# Patient Record
Sex: FEMALE | Race: OTHER | HISPANIC OR LATINO | Employment: OTHER | URBAN - METROPOLITAN AREA
[De-identification: names, ages, dates, MRNs, and addresses within clinical notes are randomized per-mention and may not be internally consistent; named-entity substitution may affect disease eponyms.]

---

## 2018-01-09 ENCOUNTER — TRANSCRIBE ORDERS (OUTPATIENT)
Dept: ADMINISTRATIVE | Facility: HOSPITAL | Age: 73
End: 2018-01-09

## 2018-01-09 ENCOUNTER — ALLSCRIPTS OFFICE VISIT (OUTPATIENT)
Dept: OTHER | Facility: OTHER | Age: 73
End: 2018-01-09

## 2018-01-09 DIAGNOSIS — R10.9 ABDOMINAL PAIN: ICD-10-CM

## 2018-01-09 DIAGNOSIS — R10.9 STOMACH ACHE: Primary | ICD-10-CM

## 2018-01-10 NOTE — CONSULTS
Assessment   1  Abdominal pain (789 00) (R10 9)   2  History of Left lower quadrant pain (789 04) (R10 32)   3  History of gastroenteritis (V12 79) (Z87 19)   4  History of colonic polyps (V12 72) (Z86 010)   5  History of Appendectomy   6  Family history of cardiac disorder (V17 49) (Z82 49) : Other   7  No alcohol use   8  Does not smoke (V49 89) (Z78 9)    Plan   Abdominal pain    · (1) HELICOBACTER PYLORI ANTIGEN, STOOL; Status:Active; Requested    HFR:[id number]; Perform:[facility]; WNC:[id number]; For:Abdominal pain; Ordered By:Reza Gibson;   · * CT ABDOMEN PELVIS W CONTRAST; Status:Need Information - Financial Authorization; Requested ZLM:[id number]; Perform:[facility]; PFQ:[id number];CYTIYIN; For:Abdominal pain; Ordered By:Reza Gibson;    Discussion/Summary   Discussion Summary:    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  Will try FODMAP diet  Discussed avoiding dairy and starting a high-fiber diet  We also discussed starting probiotics  Lastly she is worried about cancer and does not want to undergo CT scan scan with contrast or undergo colonoscopy evaluation at this time will plan for CT scan without contrast  Did discuss that reaction with CT scan with IV contrast is rare but she is very hesitant so would avoid at this time  If her symptoms does get where she will likely need a colonoscopy and a CT scan with IV contrast  I suspect due to sister being diagnosed with cancer she is worried due to this  Will continue follow  Chief Complaint   Chief Complaint Free Text Note Form: Patient is here for consult for abdominal pain  History of Present Illness   HPI: Yessi Paredes is a 28-year-old female presents here for evaluation of abdominal discomfort and dyspepsia  Abdominal discomfort is mostly located in the lower abdomen with no clear association with oral intake or bowel movements   She also recently had abdominal pain in the right side of her abdomen located in the lower aspect which resolved on its own likely due to gas pain  She is worried at this time as she recently lost her sister to unknown cancer  She is worried about having cancer and would like further evaluation  she recently had evaluation by a gynecologist of which records are not available to us due to intermittent vaginal bleeding  She is following up with OB Gyn for further evaluation of this  Current bleach she is worried about abdominal pain which is located in the periumbilical region more in the lower abdomen associated with shooting pain  She does have abdominal pain for the last 3 days but states that abdominal discomfort started prior to this  No clear triggers identified for the abdominal discomfort  She did recently have a gastroenteritis which lasted 1-2 weeks after eating at a restaurant  Multiple people were sick including her   Symptoms of abdominal discomfort has persisted since  Diarrhea has resolved now stools are becoming more well-formed  Instead of having bowel movements on a daily basis now she has multiple bowel movements feeling incomplete evacuation  She did have a colonoscopy done 5 years ago but would like to avoid 1 at this time  She has not had recent abdominal imaging or CT scans  She did have warm sensation with IV dye previously and is unsure if she would like to get a CT scan with IV contrast       Review of Systems   Complete-Female GI Adult:      Constitutional: No fever, no chills, feels well, no tiredness, no recent weight gain or weight loss  Eyes: No complaints of eye pain, no red eyes, no eyesight problems, no discharge, no dry eyes, no itching of eyes  ENT: no complaints of earache, no loss of hearing, no nose bleeds, no nasal discharge, no sore throat, no hoarseness  Cardiovascular: No complaints of slow heart rate, no fast heart rate, no chest pain, no palpitations, no leg claudication, no lower extremity edema        Respiratory: No complaints of shortness of breath, no wheezing, no cough, no SOB on exertion, no orthopnea, no PND  Gastrointestinal: abdominal pain  Genitourinary: No complaints of dysuria, no incontinence, no pelvic pain, no dysmenorrhea, no vaginal discharge or bleeding  Musculoskeletal: No complaints of arthralgias, no myalgias, no joint swelling or stiffness, no limb pain or swelling  Integumentary: No complaints of skin rash or lesions, no itching, no skin wounds, no breast pain or lump  Neurological: No complaints of headache, no confusion, no convulsions, no numbness, no dizziness or fainting, no tingling, no limb weakness, no difficulty walking  Psychiatric: Not suicidal, no sleep disturbance, no anxiety or depression, no change in personality, no emotional problems  Endocrine: No complaints of proptosis, no hot flashes, no muscle weakness, no deepening of the voice, no feelings of weakness  Hematologic/Lymphatic: No complaints of swollen glands, no swollen glands in the neck, does not bleed easily, does not bruise easily  ROS Reviewed:    ROS reviewed  Past Medical History   1  History of colonic polyps (V12 72) (Z86 010)   2  History of gastroenteritis (V12 79) (Z87 19)   3  History of Left lower quadrant pain (789 04) (R10 32)    Surgical History   1  History of Appendectomy    Social History    · Does not smoke (V49 89) (Z78 9)   · No alcohol use    Current Meds   Medication List Reviewed: The medication list was reviewed and updated today  Vitals   Vital Signs    Recorded: [id number] 10:18AM   Temperature 97 9 F, Tympanic   Heart Rate 84   Respiration 18   Systolic 514, LUE, Sitting   Diastolic 83, LUE, Sitting   Height 5 ft 3 5 in   Weight 158 lb    BMI Calculated 27 55   BSA Calculated 1 76     Physical Exam        Constitutional      General appearance: No acute distress, well appearing and well nourished         Eyes      Conjunctiva and lids: No swelling, erythema or discharge  Ears, Nose, Mouth, and Throat      External inspection of ears and nose: Normal        Oropharynx: Normal with no erythema, edema, exudate or lesions  Pulmonary      Respiratory effort: No increased work of breathing or signs of respiratory distress  Auscultation of lungs: Clear to auscultation  Cardiovascular      Palpation of heart: Normal PMI, no thrills  Auscultation of heart: Normal rate and rhythm, normal S1 and S2, without murmurs  Examination of extremities for edema and/or varicosities: Normal        Abdomen      Abdomen: Non-tender, no masses  Liver and spleen: No hepatomegaly or splenomegaly  Musculoskeletal      Gait and station: Normal        Neurologic      Cranial nerves: Cranial nerves 2-12 intact         Psychiatric      Orientation to person, place, and time: Normal           Signatures    Electronically signed by : No Nunez MD; Jan 9 2018 11:20AM EST                       (Author)

## 2018-01-15 ENCOUNTER — TRANSCRIBE ORDERS (OUTPATIENT)
Dept: LAB | Facility: HOSPITAL | Age: 73
End: 2018-01-15

## 2018-01-15 ENCOUNTER — APPOINTMENT (OUTPATIENT)
Dept: LAB | Facility: HOSPITAL | Age: 73
End: 2018-01-15
Attending: INTERNAL MEDICINE
Payer: COMMERCIAL

## 2018-01-15 DIAGNOSIS — R10.9 ABDOMINAL PAIN: ICD-10-CM

## 2018-01-15 LAB
ANION GAP SERPL CALCULATED.3IONS-SCNC: 5 MMOL/L (ref 4–13)
BUN SERPL-MCNC: 18 MG/DL (ref 5–25)
CALCIUM SERPL-MCNC: 9.3 MG/DL (ref 8.3–10.1)
CHLORIDE SERPL-SCNC: 107 MMOL/L (ref 100–108)
CO2 SERPL-SCNC: 29 MMOL/L (ref 21–32)
CREAT SERPL-MCNC: 0.78 MG/DL (ref 0.6–1.3)
GFR SERPL CREATININE-BSD FRML MDRD: 77 ML/MIN/1.73SQ M
GLUCOSE SERPL-MCNC: 88 MG/DL (ref 65–140)
POTASSIUM SERPL-SCNC: 4.1 MMOL/L (ref 3.5–5.3)
SODIUM SERPL-SCNC: 141 MMOL/L (ref 136–145)

## 2018-01-15 PROCEDURE — 80048 BASIC METABOLIC PNL TOTAL CA: CPT

## 2018-01-15 PROCEDURE — 36415 COLL VENOUS BLD VENIPUNCTURE: CPT

## 2018-01-16 ENCOUNTER — GENERIC CONVERSION - ENCOUNTER (OUTPATIENT)
Dept: OTHER | Facility: OTHER | Age: 73
End: 2018-01-16

## 2018-01-17 ENCOUNTER — GENERIC CONVERSION - ENCOUNTER (OUTPATIENT)
Dept: OTHER | Facility: OTHER | Age: 73
End: 2018-01-17

## 2018-01-17 ENCOUNTER — HOSPITAL ENCOUNTER (OUTPATIENT)
Dept: RADIOLOGY | Facility: HOSPITAL | Age: 73
Discharge: HOME | End: 2018-01-17
Attending: INTERNAL MEDICINE
Payer: COMMERCIAL

## 2018-01-17 ENCOUNTER — APPOINTMENT (OUTPATIENT)
Dept: LAB | Facility: HOSPITAL | Age: 73
End: 2018-01-17
Attending: INTERNAL MEDICINE
Payer: COMMERCIAL

## 2018-01-17 ENCOUNTER — TRANSCRIBE ORDERS (OUTPATIENT)
Dept: RADIOLOGY | Facility: HOSPITAL | Age: 73
End: 2018-01-17

## 2018-01-17 DIAGNOSIS — R10.9 ABDOMINAL PAIN: ICD-10-CM

## 2018-01-17 PROCEDURE — 87338 HPYLORI STOOL AG IA: CPT

## 2018-01-17 PROCEDURE — 74176 CT ABD & PELVIS W/O CONTRAST: CPT

## 2018-01-18 LAB — H PYLORI AG STL QL IA: POSITIVE

## 2018-01-19 ENCOUNTER — GENERIC CONVERSION - ENCOUNTER (OUTPATIENT)
Dept: OTHER | Facility: OTHER | Age: 73
End: 2018-01-19

## 2018-01-23 VITALS
BODY MASS INDEX: 26.98 KG/M2 | WEIGHT: 158 LBS | RESPIRATION RATE: 18 BRPM | HEART RATE: 84 BPM | DIASTOLIC BLOOD PRESSURE: 83 MMHG | HEIGHT: 64 IN | SYSTOLIC BLOOD PRESSURE: 120 MMHG | TEMPERATURE: 97.9 F

## 2018-01-23 NOTE — RESULT NOTES
Verified Results  (1) BASIC METABOLIC PROFILE [id number] 10:19AM Dre Carrero Order Number: [id number]     Test Name Result Flag Reference   GLUCOSE,RANDM 88 mg/dL     If the patient is fasting, the ADA then defines impaired fasting glucose as > 100 mg/dL and diabetes as > or equal to 123 mg/dL  Specimen collection should occur prior to Sulfasalazine administration due to the potential for falsely depressed results  Specimen collection should occur prior to Sulfapyridine administration due to the potential for falsely elevated results  SODIUM 141 mmol/L  136-145   POTASSIUM 4 1 mmol/L  3 5-5 3   CHLORIDE 107 mmol/L  100-108   CARBON DIOXIDE 29 mmol/L  21-32   ANION GAP (CALC) 5 mmol/L  4-13   BLOOD UREA NITROGEN 18 mg/dL  5-25   CREATININE 0 78 mg/dL  0 60-1 30   Standardized to IDMS reference method   CALCIUM 9 3 mg/dL  8 3-10 1   eGFR 77 ml/min/1 73sq [facility] Disease Education Program recommendations are as follows:  GFR calculation is accurate only with a steady state creatinine  Chronic Kidney disease less than 60 ml/min/1 73 sq  meters  Kidney failure less than 15 ml/min/1 73 sq  meters

## 2018-01-23 NOTE — RESULT NOTES
Discussion/Summary   H pylori is positive please have her office PA or nurse help with this treatment     eradicating  also should be confirmed     Verified Results  (1) [address] w [id number] 11:01AM Ryanne Trujillo Order Number: [id number]     Test Name Result Flag Reference   H PYLORI ANTIGEN Positive A Negative   Performed at:  [facility] [employer] [address]  [id number]  : Darren Liriano MD, Phone:  [phone]

## 2018-01-23 NOTE — RESULT NOTES
Discussion/Summary   CT without contrast within normal limits  can consider colonoscopy evaluation if symptoms of abdominal discomfort still persistent     Verified Results  * CT ABDOMEN PELVIS WO CONTRAST [id number] 11:22AM Cleven Party Order Number: [id number]    - Patient Instructions: To schedule this appointment, please contact Central Scheduling at [phone]  Test Name Result Flag Reference   CT ABDOMEN PELVIS WO CONTRAST (Report)     CT ABDOMEN AND PELVIS WITHOUT IV CONTRAST     INDICATION: Abdominal pain  Symptoms for a week  COMPARISON: None  TECHNIQUE: CT examination of the abdomen and pelvis was performed without intravenous contrast  The initial attempt at placement of IV catheter was unsuccessful and patient then refused further attempts  Referring doctor sent new prescription for    noncontrast exam  Reformatted images were created in axial, sagittal, and coronal planes  Radiation dose length product (DLP) for this visit: 356 69 mGy-cm   This examination, like all CT scans performed in the [facility], was performed utilizing techniques to minimize radiation dose exposure, including the use of iterative   reconstruction and automated exposure control  Enteric contrast was administered  FINDINGS:     ABDOMEN     LOWER CHEST: No significant abnormalities identified in the lower chest      LIVER/BILIARY TREE: Unremarkable  GALLBLADDER: No calcified gallstones  No pericholecystic inflammatory change  SPLEEN: Unremarkable  PANCREAS: Unremarkable  ADRENAL GLANDS: Unremarkable  KIDNEYS/URETERS: Unremarkable  No hydronephrosis  STOMACH AND BOWEL: Unremarkable  APPENDIX: No findings to suggest appendicitis  ABDOMINOPELVIC CAVITY: No ascites or free intraperitoneal air  No lymphadenopathy  VESSELS: Atherosclerotic changes are present  No evidence of aneurysm       PELVIS     REPRODUCTIVE ORGANS: Unremarkable for patient's age      URINARY BLADDER: Unremarkable  ABDOMINAL WALL/INGUINAL REGIONS: Unremarkable  OSSEOUS STRUCTURES: No acute fracture or destructive osseous lesion  There is 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

## 2018-01-23 NOTE — CONSULTS
Chief Complaint  Patient is here for consult for abdominal pain  History of Present Illness  Jim Guevara is a 79-year-old female presents here for evaluation of abdominal discomfort and dyspepsia  Abdominal discomfort is mostly located in the lower abdomen with no clear association with oral intake or bowel movements  She also recently had abdominal pain in the right side of her abdomen located in the lower aspect which resolved on its own likely due to gas pain  She is worried at this time as she recently lost her sister to unknown cancer  She is worried about having cancer and would like further evaluation  she recently had evaluation by a gynecologist of which records are not available to us due to intermittent vaginal bleeding  She is following up with OB Gyn for further evaluation of this  Current bleach she is worried about abdominal pain which is located in the periumbilical region more in the lower abdomen associated with shooting pain  She does have abdominal pain for the last 3 days but states that abdominal discomfort started prior to this  No clear triggers identified for the abdominal discomfort  She did recently have a gastroenteritis which lasted 1-2 weeks after eating at a restaurant  Multiple people were sick including her   Symptoms of abdominal discomfort has persisted since  Diarrhea has resolved now stools are becoming more well-formed  Instead of having bowel movements on a daily basis now she has multiple bowel movements feeling incomplete evacuation  She did have a colonoscopy done 5 years ago but would like to avoid 1 at this time  She has not had recent abdominal imaging or CT scans  She did have warm sensation with IV dye previously and is unsure if she would like to get a CT scan with IV contrast       Review of Systems    Constitutional: No fever, no chills, feels well, no tiredness, no recent weight gain or weight loss     Eyes: No complaints of eye pain, no red eyes, no eyesight problems, no discharge, no dry eyes, no itching of eyes  ENT: no complaints of earache, no loss of hearing, no nose bleeds, no nasal discharge, no sore throat, no hoarseness  Cardiovascular: No complaints of slow heart rate, no fast heart rate, no chest pain, no palpitations, no leg claudication, no lower extremity edema  Respiratory: No complaints of shortness of breath, no wheezing, no cough, no SOB on exertion, no orthopnea, no PND  Gastrointestinal: abdominal pain  Genitourinary: No complaints of dysuria, no incontinence, no pelvic pain, no dysmenorrhea, no vaginal discharge or bleeding  Musculoskeletal: No complaints of arthralgias, no myalgias, no joint swelling or stiffness, no limb pain or swelling  Integumentary: No complaints of skin rash or lesions, no itching, no skin wounds, no breast pain or lump  Neurological: No complaints of headache, no confusion, no convulsions, no numbness, no dizziness or fainting, no tingling, no limb weakness, no difficulty walking  Psychiatric: Not suicidal, no sleep disturbance, no anxiety or depression, no change in personality, no emotional problems  Endocrine: No complaints of proptosis, no hot flashes, no muscle weakness, no deepening of the voice, no feelings of weakness  Hematologic/Lymphatic: No complaints of swollen glands, no swollen glands in the neck, does not bleed easily, does not bruise easily  ROS reviewed  Past Medical History    · History of colonic polyps (V12 72) (Z86 010)   · History of gastroenteritis (V12 79) (Z87 19)   · History of Left lower quadrant pain (789 04) (R10 32)    Surgical History    · History of Appendectomy    Social History    · Does not smoke (V49 89) (Z78 9)   · No alcohol use    Current Meds    The medication list was reviewed and updated today        Vitals   Recorded: [id number] 10:18AM   Temperature 97 9 F, Tympanic   Heart Rate 84   Respiration 18   Systolic 682, LUE, Sitting   Diastolic 83, LUE, Sitting   Height 5 ft 3 5 in   Weight 158 lb    BMI Calculated 27 55   BSA Calculated 1 76     Physical Exam    Constitutional   General appearance: No acute distress, well appearing and well nourished  Eyes   Conjunctiva and lids: No swelling, erythema or discharge  Ears, Nose, Mouth, and Throat   External inspection of ears and nose: Normal     Oropharynx: Normal with no erythema, edema, exudate or lesions  Pulmonary   Respiratory effort: No increased work of breathing or signs of respiratory distress  Auscultation of lungs: Clear to auscultation  Cardiovascular   Palpation of heart: Normal PMI, no thrills  Auscultation of heart: Normal rate and rhythm, normal S1 and S2, without murmurs  Examination of extremities for edema and/or varicosities: Normal     Abdomen   Abdomen: Non-tender, no masses  Liver and spleen: No hepatomegaly or splenomegaly  Musculoskeletal   Gait and station: Normal     Neurologic   Cranial nerves: Cranial nerves 2-12 intact  Psychiatric   Orientation to person, place, and time: Normal          Assessment    1  Abdominal pain (789 00) (R10 9)   2  History of Left lower quadrant pain (789 04) (R10 32)   3  History of gastroenteritis (V12 79) (Z87 19)   4  History of colonic polyps (V12 72) (Z86 010)   5  History of Appendectomy   6  Family history of cardiac disorder (V17 49) (Z82 49) : Other   7  No alcohol use   8  Does not smoke (V49 89) (Z78 9)    Plan  Abdominal pain    · (1) HELICOBACTER PYLORI ANTIGEN, STOOL; Status:Active; Requested  RXE:[id number]; Perform:[facility]; MWF:[id number]; For:Abdominal pain; Ordered By:Reza Gibson;   · * CT ABDOMEN PELVIS W CONTRAST; Status:Need Information - Financial Authorization; Requested CTD:[id number]; Perform:[facility]; UWI:[id number]; For:Abdominal pain; Ordered By:Reza Gibson;    Discussion/Summary    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   Will try FODMAP diet  Discussed avoiding dairy and starting a high-fiber diet  We also discussed starting probiotics  Lastly she is worried about cancer and does not want to undergo CT scan scan with contrast or undergo colonoscopy evaluation at this time will plan for CT scan without contrast  Did discuss that reaction with CT scan with IV contrast is rare but she is very hesitant so would avoid at this time  If her symptoms does get where she will likely need a colonoscopy and a CT scan with IV contrast  I suspect due to sister being diagnosed with cancer she is worried due to this  Will continue follow        Signatures   Electronically signed by : Dorie Cisneros MD; Jan 9 2018 11:20AM EST                       (Author)

## 2018-01-23 NOTE — MISCELLANEOUS
Message   Recorded as Task   Date: 01/18/2018 11:43 AM, Created By: Ryanne Santillan   Task Name: Care Coordination   Assigned To: Reza Gibson   Regarding Patient: Kylie Conley, Status: Active   Comment:    Ryanne Santillan - 18 Jan 2018 11:43 AM     TASK CREATED  JUST FYI- Patient wanted you to know that she was trying to get the CT Scan done with contrast but the tech that put IV in hurt her and she was  bleeding and her arm was very sore  She told the tech her veins are very weak and she did't seem to care   This is why she did the test w/o contrast      Signatures   Electronically signed by : Derrick Sullivan MD; Jan 19 2018  8:52AM EST                       (Author)

## 2018-01-24 DIAGNOSIS — A04.8 H. PYLORI INFECTION: Primary | ICD-10-CM

## 2018-01-24 RX ORDER — OMEPRAZOLE 40 MG/1
40 CAPSULE, DELAYED RELEASE ORAL 2 TIMES DAILY
Qty: 28 CAPSULE | Refills: 0 | Status: SHIPPED | OUTPATIENT
Start: 2018-01-24 | End: 2018-02-02 | Stop reason: SDUPTHER

## 2018-01-24 RX ORDER — METRONIDAZOLE 250 MG/1
500 TABLET ORAL EVERY 12 HOURS SCHEDULED
Qty: 56 TABLET | Refills: 0 | Status: SHIPPED | OUTPATIENT
Start: 2018-01-24 | End: 2018-02-07

## 2018-01-24 RX ORDER — CLARITHROMYCIN 500 MG/1
500 TABLET, COATED ORAL EVERY 12 HOURS SCHEDULED
Qty: 28 TABLET | Refills: 0 | Status: SHIPPED | OUTPATIENT
Start: 2018-01-24 | End: 2018-02-07

## 2018-01-24 NOTE — TELEPHONE ENCOUNTER
Telephone note: Pt was informed of diagnosis of h  Pylori infection and all questions were answered  She is PCN allergic so will prescribe triple therapy with metronidazole  She is aware that she should get an h  Pylori stool test one month after completing treatment (off PPI therapy for 2 weeks) to confirm eradication

## 2018-01-24 NOTE — TELEPHONE ENCOUNTER
Please send script for h  Pylori stool test to pt - she should get it done 1 month after completing antibiotics (off PPI for at least 2 weeks)  Thank you!

## 2018-01-25 ENCOUNTER — TELEPHONE (OUTPATIENT)
Dept: GASTROENTEROLOGY | Facility: MEDICAL CENTER | Age: 73
End: 2018-01-25

## 2018-01-25 NOTE — TELEPHONE ENCOUNTER
Spoke to the patient and she is aware that this is only for the month time for her h pylori infection  If she has anyother questions she will call us back

## 2018-02-01 ENCOUNTER — TELEPHONE (OUTPATIENT)
Dept: GASTROENTEROLOGY | Facility: MEDICAL CENTER | Age: 73
End: 2018-02-01

## 2018-02-01 NOTE — TELEPHONE ENCOUNTER
Dr Deion Holbrook pt called stating she has been taking the H-Pylori medication but is still having pain in her stomach and wanted to know should she still have the pain  Pt also wants to know do she still have to take a stool sample on 02/24/18  Pt can be reached at [phone]

## 2018-02-02 ENCOUNTER — TELEPHONE (OUTPATIENT)
Dept: GASTROENTEROLOGY | Facility: CLINIC | Age: 73
End: 2018-02-02

## 2018-02-02 DIAGNOSIS — A04.8 H. PYLORI INFECTION: ICD-10-CM

## 2018-02-02 RX ORDER — OMEPRAZOLE 40 MG/1
CAPSULE, DELAYED RELEASE ORAL
Qty: 28 CAPSULE | Refills: 0 | Status: SHIPPED | OUTPATIENT
Start: 2018-02-02 | End: 2019-08-06

## 2018-02-02 RX ORDER — OMEPRAZOLE 40 MG/1
40 CAPSULE, DELAYED RELEASE ORAL 2 TIMES DAILY
Qty: 28 CAPSULE | Refills: 0 | OUTPATIENT
Start: 2018-02-02 | End: 2018-02-16

## 2018-02-02 NOTE — TELEPHONE ENCOUNTER
Please ensure she has a follow up in the office  She does not need to continue PPI if she is not having symptoms, this was for triple therapy for h  Pylori  Thank you!

## 2018-02-05 ENCOUNTER — TELEPHONE (OUTPATIENT)
Dept: GASTROENTEROLOGY | Facility: CLINIC | Age: 73
End: 2018-02-05

## 2018-02-05 NOTE — TELEPHONE ENCOUNTER
Emi Borrero pt requesting a call back from Dr Laurie Varela in regards to her medication   Please call her back

## 2018-02-05 NOTE — TELEPHONE ENCOUNTER
Patient was confused on h pylori treatment  I clarified that she should be on the antibiotics for 14 days and then continue the omeprazole for 2 more weeks   Once she has completed the entire medication regimen, she knows to get her stool test 2 weeks after stopping the PPI

## 2018-02-12 ENCOUNTER — TELEPHONE (OUTPATIENT)
Dept: GASTROENTEROLOGY | Facility: CLINIC | Age: 73
End: 2018-02-12

## 2018-02-12 NOTE — TELEPHONE ENCOUNTER
DR BRITO'S PT    Pt called stating she will be going down to 1 tablet a day of the omeprazole because it causes drossiness

## 2018-02-19 ENCOUNTER — APPOINTMENT (OUTPATIENT)
Dept: LAB | Facility: HOSPITAL | Age: 73
End: 2018-02-19
Attending: INTERNAL MEDICINE
Payer: COMMERCIAL

## 2018-02-19 DIAGNOSIS — A04.8 H. PYLORI INFECTION: ICD-10-CM

## 2018-02-19 PROCEDURE — 87338 HPYLORI STOOL AG IA: CPT

## 2018-02-20 LAB — H PYLORI AG STL QL IA: NEGATIVE

## 2018-02-21 ENCOUNTER — TELEPHONE (OUTPATIENT)
Dept: GASTROENTEROLOGY | Facility: CLINIC | Age: 73
End: 2018-02-21

## 2018-02-22 ENCOUNTER — TELEPHONE (OUTPATIENT)
Dept: GASTROENTEROLOGY | Facility: MEDICAL CENTER | Age: 73
End: 2018-02-22

## 2018-02-22 NOTE — TELEPHONE ENCOUNTER
----- Message from Xavier Meredith MD sent at 2/21/2018 11:12 PM EST -----  H pylori was identified to be negative

## 2019-06-10 ENCOUNTER — PATIENT OUTREACH (OUTPATIENT)
Dept: CASE MANAGEMENT | Facility: OTHER | Age: 74
End: 2019-06-10

## 2019-08-06 ENCOUNTER — OFFICE VISIT (OUTPATIENT)
Dept: GASTROENTEROLOGY | Facility: MEDICAL CENTER | Age: 74
End: 2019-08-06
Payer: COMMERCIAL

## 2019-08-06 ENCOUNTER — DOCUMENTATION (OUTPATIENT)
Dept: GASTROENTEROLOGY | Facility: MEDICAL CENTER | Age: 74
End: 2019-08-06

## 2019-08-06 ENCOUNTER — APPOINTMENT (OUTPATIENT)
Dept: LAB | Facility: MEDICAL CENTER | Age: 74
End: 2019-08-06
Attending: INTERNAL MEDICINE
Payer: COMMERCIAL

## 2019-08-06 VITALS
SYSTOLIC BLOOD PRESSURE: 114 MMHG | DIASTOLIC BLOOD PRESSURE: 76 MMHG | TEMPERATURE: 97.5 F | BODY MASS INDEX: 27.46 KG/M2 | HEIGHT: 63 IN | HEART RATE: 74 BPM | WEIGHT: 155 LBS

## 2019-08-06 DIAGNOSIS — R10.13 DYSPEPSIA: ICD-10-CM

## 2019-08-06 DIAGNOSIS — Z86.010 HISTORY OF COLON POLYPS: Primary | ICD-10-CM

## 2019-08-06 DIAGNOSIS — Z86.19 HISTORY OF HELICOBACTER PYLORI INFECTION: ICD-10-CM

## 2019-08-06 PROBLEM — Z86.0100 HISTORY OF COLON POLYPS: Status: ACTIVE | Noted: 2019-08-06

## 2019-08-06 PROCEDURE — 82784 ASSAY IGA/IGD/IGG/IGM EACH: CPT

## 2019-08-06 PROCEDURE — 99214 OFFICE O/P EST MOD 30 MIN: CPT | Performed by: INTERNAL MEDICINE

## 2019-08-06 PROCEDURE — 36415 COLL VENOUS BLD VENIPUNCTURE: CPT

## 2019-08-06 PROCEDURE — 86255 FLUORESCENT ANTIBODY SCREEN: CPT

## 2019-08-06 PROCEDURE — 83516 IMMUNOASSAY NONANTIBODY: CPT

## 2019-08-06 RX ORDER — MULTIVIT-MIN/IRON FUM/FOLIC AC 7.5 MG-4
1 TABLET ORAL DAILY
COMMUNITY
End: 2021-01-15

## 2019-08-06 RX ORDER — LEVOTHYROXINE SODIUM 0.1 MG/1
100 TABLET ORAL
COMMUNITY
Start: 2019-07-05 | End: 2021-10-25 | Stop reason: DRUGHIGH

## 2019-08-07 LAB
ENDOMYSIUM IGA SER QL: NEGATIVE
GLIADIN PEPTIDE IGA SER-ACNC: 15 UNITS (ref 0–19)
GLIADIN PEPTIDE IGG SER-ACNC: 2 UNITS (ref 0–19)
IGA SERPL-MCNC: 293 MG/DL (ref 64–422)
TTG IGA SER-ACNC: <2 U/ML (ref 0–3)
TTG IGG SER-ACNC: <2 U/ML (ref 0–5)

## 2019-08-08 ENCOUNTER — TELEPHONE (OUTPATIENT)
Dept: GASTROENTEROLOGY | Facility: MEDICAL CENTER | Age: 74
End: 2019-08-08

## 2019-08-08 PROBLEM — R10.13 DYSPEPSIA: Status: ACTIVE | Noted: 2019-08-08

## 2019-08-08 NOTE — PROGRESS NOTES
[facility] - Outpatient Follow-up Note  Karla Felipe 68 y o  female MRN: [id number]  Encounter: [id number]          ASSESSMENT AND PLAN:      1  History of colon polyps  - Cologuard    She does have previous history of colonic polyps and would be due for colonoscopy now  She is not interested undergoing colonoscopy evaluation despite having extensive discussion with her  She is agreeable to having undergo colon Guard evaluation  She states of colon Guard is positive she would consider colonoscopy evaluation at that time  2  History of Helicobacter pylori infection  - H  pylori antigen, stool; Future    3  Dyspepsia  - Celiac Disease Antibody Profile; Future    She has history of H pylori infection and symptoms have relapse which may be secondary to  ______________________________________________________________________    SUBJECTIVE:     She is a 61-year-old female presents here for follow-up evaluation  She is here due to relapse of dyspepsia associated with abdominal bloating and discomfort  She was previously diagnosed with H pylori infection on stool studies on 01/15/2018  She was subsequently treated for H pylori but repeat stool studies were performed on 02/19/2018  Stool studies which repeated were negative but her symptoms have relapse  I suspect she may have had possibly a false-negative has stool studies were repeated too soon after treatment  She does have history of colonic polyps but currently not interested in undergoing colonoscopy evaluation  We discuss importance of undergoing colonoscopy evaluation she would like to defer this unless cologuard is positive  REVIEW OF SYSTEMS IS OTHERWISE NEGATIVE  Historical Information   No past medical history on file  No past surgical history on file    Social History   Social History     Substance and Sexual Activity   Alcohol Use Not on file     Social History     Substance and Sexual Activity   Drug Use Not on file     Social History     Tobacco Use   Smoking Status Not on file     No family history on file  Meds/Allergies       Current Outpatient Medications:     Ascorbic Acid (VITAMIN C) 500 MG CHEW    levothyroxine 100 mcg tablet    Multiple Vitamins-Minerals (MULTIVITAMIN WITH MINERALS) tablet    Allergies   Allergen Reactions    Crab (Diagnostic)     Penicillins            Objective     Blood pressure 114/76, pulse 74, temperature 97 5 °F (36 4 °C), temperature source Tympanic, height 5' 3" (1 6 m), weight 70 3 kg (155 lb)  Body mass index is 27 46 kg/m²  PHYSICAL EXAM:      General Appearance:   Alert, cooperative, no distress   HEENT:   Normocephalic, atraumatic, anicteric      Neck:  Supple, symmetrical, trachea midline   Lungs:   Clear to auscultation bilaterally; no rales, rhonchi or wheezing; respirations unlabored    Heart[de-identified]   Regular rate and rhythm; no murmur, rub, or gallop  Abdomen:   Soft, non-tender, non-distended; normal bowel sounds; no masses, no organomegaly    Genitalia:   Deferred    Rectal:   Deferred    Extremities:  No cyanosis, clubbing or edema    Pulses:  2+ and symmetric    Skin:  No jaundice, rashes, or lesions    Lymph nodes:  No palpable cervical lymphadenopathy        Lab Results:   Appointment on 08/06/2019   Component Date Value    IgA 08/06/2019 293     Gliadin IgA 08/06/2019 15     Gliadin IgG 08/06/2019 2     Tissue Transglut Ab IGG 08/06/2019 <2     TISSUE TRANSGLUTAMINASE * 08/06/2019 <2     Endomysial IgA 08/06/2019 Negative          Radiology Results:   No results found

## 2019-08-08 NOTE — TELEPHONE ENCOUNTER
----- Message from Devan Holland MD sent at 8/8/2019  1:04 PM EDT -----  Call patient to report normal results

## 2019-08-10 ENCOUNTER — APPOINTMENT (OUTPATIENT)
Dept: LAB | Facility: MEDICAL CENTER | Age: 74
End: 2019-08-10
Attending: INTERNAL MEDICINE
Payer: COMMERCIAL

## 2019-08-10 DIAGNOSIS — Z86.19 HISTORY OF HELICOBACTER PYLORI INFECTION: ICD-10-CM

## 2019-08-10 PROCEDURE — 87338 HPYLORI STOOL AG IA: CPT

## 2019-08-12 LAB — H PYLORI AG STL QL IA: NEGATIVE

## 2019-09-12 ENCOUNTER — TELEPHONE (OUTPATIENT)
Dept: GASTROENTEROLOGY | Facility: CLINIC | Age: 74
End: 2019-09-12

## 2019-09-12 NOTE — TELEPHONE ENCOUNTER
Dr Leonor Valencia pt called looking for results from her cologuard test  Pt states she sent the test back last week   Pt can be reached at [phone]

## 2019-10-18 ENCOUNTER — TELEPHONE (OUTPATIENT)
Dept: GASTROENTEROLOGY | Facility: MEDICAL CENTER | Age: 74
End: 2019-10-18

## 2019-10-18 NOTE — TELEPHONE ENCOUNTER
Patients GI provider:  Dr Jaun Segundo    Number to return call: [phone]    Reason for call: Pt calling to speak with someone regarding cologaurd order error  Patient stated it is missing a cup and she would like to know what she should do      Scheduled procedure/appointment date if applicable: Apt/procedure

## 2019-10-18 NOTE — TELEPHONE ENCOUNTER
I called the patient, she stated that the stool sample she originally sent back in August was not done properly and is waiting to receive another kit  I have Kevin Taylor completing the [employer] form and will fax it to them

## 2019-10-18 NOTE — TELEPHONE ENCOUNTER
Called [facility] at [phone]  I was told that the patient will need to call them and they will take care of sending out a new kit  Pt called and given the [facility] phone number and the message I was given  I also told Ms Shilpi Alcazar to please call me back if she if she has any other questions or problems reaching [facility]

## 2019-11-11 ENCOUNTER — TELEPHONE (OUTPATIENT)
Dept: GASTROENTEROLOGY | Facility: MEDICAL CENTER | Age: 74
End: 2019-11-11

## 2019-11-11 NOTE — TELEPHONE ENCOUNTER
----- Message from Christelle Ratliff MD sent at 11/8/2019  2:22 PM EST -----  Call patient to report normal results

## 2020-02-11 ENCOUNTER — OFFICE VISIT (OUTPATIENT)
Dept: GASTROENTEROLOGY | Facility: CLINIC | Age: 75
End: 2020-02-11
Payer: COMMERCIAL

## 2020-02-11 VITALS
HEART RATE: 86 BPM | HEIGHT: 63 IN | SYSTOLIC BLOOD PRESSURE: 112 MMHG | TEMPERATURE: 98.3 F | DIASTOLIC BLOOD PRESSURE: 77 MMHG | WEIGHT: 148 LBS | BODY MASS INDEX: 26.22 KG/M2

## 2020-02-11 DIAGNOSIS — R10.13 DYSPEPSIA: ICD-10-CM

## 2020-02-11 DIAGNOSIS — R10.9 ABDOMINAL PAIN, UNSPECIFIED ABDOMINAL LOCATION: Primary | ICD-10-CM

## 2020-02-11 DIAGNOSIS — L29.9 ITCHING: ICD-10-CM

## 2020-02-11 PROCEDURE — 99214 OFFICE O/P EST MOD 30 MIN: CPT | Performed by: PHYSICIAN ASSISTANT

## 2020-02-11 RX ORDER — LEVOTHYROXINE SODIUM 0.1 MG/1
100 TABLET ORAL
COMMUNITY
Start: 2020-02-10 | End: 2020-11-24 | Stop reason: ALTCHOICE

## 2020-02-11 NOTE — PROGRESS NOTES
[facility] - Outpatient Follow-up Note  Hayley Wheatley 69 y o  female MRN: [id number]  Encounter: [id number]          ASSESSMENT AND PLAN:      1  Abdominal pain, unspecified abdominal location:  2  Dyspepsia: admits to periumbilical abdominal pain and belching  No change in bms  This could be secondary to acid reflux  She is unwilling to try antacid medication or antispasmodic  I have given her a reflux and low fod map diet to see if dietary changes improved her symptoms  We will also check an abdominal u/s at this time  - US abdomen complete; Future    3  Itching: complains of abdominal itching over the past 2 weeks  Will check cmp to rule out hepatobiliary cause  - Comprehensive metabolic panel    ______________________________________________________________________    SUBJECTIVE:  Hayley Wheatley is a 60-year-old female who is here for follow-up of abdominal pain  She has a history of dyspepsia and was diagnosed with H pylori  She was treated with antibiotics and repeat stool antigen was found to be negative  Lately she admits to periumbilical intermittent abdominal pain  She admits to increased belching  She denies any change in her bowel movements, melena, hematochezia  She does not take any antacid medication, she is unwilling to take any medication due to fear of side effects  She does eat a lot of onion, garlic and red tomato sauce  She was provided with low FODMAP and reflux diet handout  She has a history of H pylori that was treated with antibiotics and stool antigen confirmed eradication  She recently had a colon guard, which was negative as she is not interested in having any further colonoscopies in her lifetime, which she is adamant about  REVIEW OF SYSTEMS IS OTHERWISE NEGATIVE  Historical Information   History reviewed  No pertinent past medical history  History reviewed  No pertinent surgical history    Social History   Social History     Substance and Sexual Activity   Alcohol Use Never    Frequency: Never     Social History     Substance and Sexual Activity   Drug Use Never     Social History     Tobacco Use   Smoking Status Never Smoker   Smokeless Tobacco Never Used     History reviewed  No pertinent family history  Meds/Allergies       Current Outpatient Medications:     Ascorbic Acid (VITAMIN C) 500 MG CHEW    levothyroxine 100 mcg tablet    Multiple Vitamins-Minerals (MULTIVITAMIN WITH MINERALS) tablet    levothyroxine 100 mcg tablet    Allergies   Allergen Reactions    Crab (Diagnostic)     Penicillins            Objective     Blood pressure 112/77, pulse 86, temperature 98 3 °F (36 8 °C), temperature source Tympanic, height 5' 3" (1 6 m), weight 67 1 kg (148 lb)  Body mass index is 26 22 kg/m²  PHYSICAL EXAM:      General Appearance:   Alert, cooperative, no distress   HEENT:   Normocephalic, atraumatic, anicteric  Right eye exhibits no discharge  Left eye exhibits no discharge  No scleral icterus   Neck:  Supple, symmetrical, trachea midline, no stridor    Lungs:   Clear to auscultation bilaterally; no rales, rhonchi or wheezing; respirations unlabored    Heart[de-identified]   Regular rate and rhythm; no murmur, rub, or gallop  Abdomen:   Soft, non-tender, non-distended; normal bowel sounds; no masses, no organomegaly    Genitalia:   Deferred    Rectal:   Deferred    Extremities:  No cyanosis, clubbing or edema        Skin:  No jaundice, rashes, or lesions          Lab Results:   No visits with results within 1 Day(s) from this visit  Latest known visit with results is:   Appointment on 08/10/2019   Component Date Value    H pylori Ag, Stl 08/10/2019 Negative          Radiology Results:   No results found

## 2020-02-14 ENCOUNTER — TELEPHONE (OUTPATIENT)
Dept: GASTROENTEROLOGY | Facility: AMBULARY SURGERY CENTER | Age: 75
End: 2020-02-14

## 2020-02-15 ENCOUNTER — HOSPITAL ENCOUNTER (OUTPATIENT)
Dept: RADIOLOGY | Facility: HOSPITAL | Age: 75
Discharge: HOME | End: 2020-02-15
Payer: COMMERCIAL

## 2020-02-15 DIAGNOSIS — R10.9 ABDOMINAL PAIN, UNSPECIFIED ABDOMINAL LOCATION: ICD-10-CM

## 2020-02-15 PROCEDURE — 76700 US EXAM ABDOM COMPLETE: CPT

## 2020-07-02 ENCOUNTER — TRANSCRIBE ORDERS (OUTPATIENT)
Dept: ADMINISTRATIVE | Facility: HOSPITAL | Age: 75
End: 2020-07-02

## 2020-07-02 ENCOUNTER — APPOINTMENT (OUTPATIENT)
Dept: LAB | Facility: HOSPITAL | Age: 75
End: 2020-07-02
Payer: COMMERCIAL

## 2020-07-02 DIAGNOSIS — I25.10 ATHEROSCLEROSIS OF NATIVE CORONARY ARTERY WITHOUT ANGINA PECTORIS, UNSPECIFIED WHETHER NATIVE OR TRANSPLANTED HEART: ICD-10-CM

## 2020-07-02 DIAGNOSIS — E89.0 POSTSURGICAL HYPOTHYROIDISM: Primary | ICD-10-CM

## 2020-07-02 DIAGNOSIS — E89.0 POSTSURGICAL HYPOTHYROIDISM: ICD-10-CM

## 2020-07-02 DIAGNOSIS — E55.9 VITAMIN D DEFICIENCY: ICD-10-CM

## 2020-07-02 DIAGNOSIS — K21.9 GASTROESOPHAGEAL REFLUX DISEASE WITHOUT ESOPHAGITIS: ICD-10-CM

## 2020-07-02 DIAGNOSIS — M75.32 CALCIFIC TENDINITIS OF LEFT SHOULDER: ICD-10-CM

## 2020-07-02 LAB
25(OH)D3 SERPL-MCNC: 29.6 NG/ML (ref 30–100)
ALBUMIN SERPL BCP-MCNC: 4.2 G/DL (ref 3.4–4.8)
ALP SERPL-CCNC: 52.5 U/L (ref 35–140)
ALT SERPL W P-5'-P-CCNC: 17 U/L (ref 5–54)
ANION GAP SERPL CALCULATED.3IONS-SCNC: 8 MMOL/L (ref 10–20)
AST SERPL W P-5'-P-CCNC: 20 U/L (ref 15–41)
BASOPHILS # BLD AUTO: 0.03 THOUSANDS/ΜL (ref 0–0.1)
BASOPHILS NFR BLD AUTO: 1 % (ref 0–1)
BILIRUB SERPL-MCNC: 0.6 MG/DL (ref 0.3–1.2)
BUN SERPL-MCNC: 15 MG/DL (ref 6–20)
CALCIUM SERPL-MCNC: 10 MG/DL (ref 8.4–10.2)
CHLORIDE SERPL-SCNC: 105 MMOL/L (ref 96–108)
CHOLEST SERPL-MCNC: 200 MG/DL
CO2 SERPL-SCNC: 27 MMOL/L (ref 22–33)
CREAT SERPL-MCNC: 0.77 MG/DL (ref 0.4–1.1)
CRP SERPL QL: 0.1 MG/L (ref 0–1)
EOSINOPHIL # BLD AUTO: 0.33 THOUSAND/ΜL (ref 0–0.61)
EOSINOPHIL NFR BLD AUTO: 8 % (ref 0–6)
ERYTHROCYTE [DISTWIDTH] IN BLOOD BY AUTOMATED COUNT: 13.7 % (ref 11.6–15.1)
GFR SERPL CREATININE-BSD FRML MDRD: 77 ML/MIN/1.73SQ M
GLUCOSE SERPL-MCNC: 98 MG/DL (ref 65–140)
HCT VFR BLD AUTO: 45.5 % (ref 34.8–46.1)
HDLC SERPL-MCNC: 49 MG/DL
HGB BLD-MCNC: 14.6 G/DL (ref 11.5–15.4)
IMM GRANULOCYTES # BLD AUTO: 0.02 THOUSAND/UL (ref 0–0.2)
IMM GRANULOCYTES NFR BLD AUTO: 1 % (ref 0–2)
LDLC SERPL CALC-MCNC: 116 MG/DL (ref 0–100)
LYMPHOCYTES # BLD AUTO: 1.82 THOUSANDS/ΜL (ref 0.6–4.47)
LYMPHOCYTES NFR BLD AUTO: 41 % (ref 14–44)
MCH RBC QN AUTO: 29.1 PG (ref 26.8–34.3)
MCHC RBC AUTO-ENTMCNC: 32.1 G/DL (ref 31.4–37.4)
MCV RBC AUTO: 91 FL (ref 82–98)
MONOCYTES # BLD AUTO: 0.33 THOUSAND/ΜL (ref 0.17–1.22)
MONOCYTES NFR BLD AUTO: 8 % (ref 4–12)
NEUTROPHILS # BLD AUTO: 1.78 THOUSANDS/ΜL (ref 1.85–7.62)
NEUTS SEG NFR BLD AUTO: 41 % (ref 43–75)
NONHDLC SERPL-MCNC: 151 MG/DL
PLATELET # BLD AUTO: 157 THOUSANDS/UL (ref 149–390)
PMV BLD AUTO: 10.5 FL (ref 8.9–12.7)
POTASSIUM SERPL-SCNC: 4.5 MMOL/L (ref 3.5–5)
PROT SERPL-MCNC: 7.8 G/DL (ref 6.4–8.3)
RBC # BLD AUTO: 5.01 MILLION/UL (ref 3.81–5.12)
SODIUM SERPL-SCNC: 140 MMOL/L (ref 133–145)
TRIGL SERPL-MCNC: 175.4 MG/DL
TROPONIN I SERPL-MCNC: <0.03 NG/ML (ref 0–0.07)
TSH SERPL DL<=0.05 MIU/L-ACNC: 0.52 UIU/ML (ref 0.34–5.6)
WBC # BLD AUTO: 4.31 THOUSAND/UL (ref 4.31–10.16)

## 2020-07-02 PROCEDURE — 84443 ASSAY THYROID STIM HORMONE: CPT

## 2020-07-02 PROCEDURE — 85025 COMPLETE CBC W/AUTO DIFF WBC: CPT

## 2020-07-02 PROCEDURE — 86140 C-REACTIVE PROTEIN: CPT

## 2020-07-02 PROCEDURE — 82306 VITAMIN D 25 HYDROXY: CPT

## 2020-07-02 PROCEDURE — 36415 COLL VENOUS BLD VENIPUNCTURE: CPT

## 2020-07-02 PROCEDURE — 84484 ASSAY OF TROPONIN QUANT: CPT

## 2020-07-02 PROCEDURE — 80053 COMPREHEN METABOLIC PANEL: CPT

## 2020-07-02 PROCEDURE — 80061 LIPID PANEL: CPT

## 2020-07-16 ENCOUNTER — OPTICAL OFFICE (OUTPATIENT)
Dept: URBAN - METROPOLITAN AREA CLINIC 146 | Facility: CLINIC | Age: 75
Setting detail: OPHTHALMOLOGY
End: 2020-07-16
Payer: COMMERCIAL

## 2020-07-16 ENCOUNTER — RX ONLY (RX ONLY)
Age: 75
End: 2020-07-16

## 2020-07-16 ENCOUNTER — DOCTOR'S OFFICE (OUTPATIENT)
Dept: URBAN - METROPOLITAN AREA CLINIC 137 | Facility: CLINIC | Age: 75
Setting detail: OPHTHALMOLOGY
End: 2020-07-16
Payer: COMMERCIAL

## 2020-07-16 DIAGNOSIS — H52.4: ICD-10-CM

## 2020-07-16 DIAGNOSIS — H52.03: ICD-10-CM

## 2020-07-16 DIAGNOSIS — H52.223: ICD-10-CM

## 2020-07-16 PROBLEM — H25.13 CATARACT NUCLEAR SCLEROSIS; BOTH EYES: Status: ACTIVE | Noted: 2020-07-16

## 2020-07-16 PROCEDURE — 92002 INTRM OPH EXAM NEW PATIENT: CPT | Performed by: OPTOMETRIST

## 2020-07-16 PROCEDURE — V2020 VISION SVCS FRAMES PURCHASES: HCPCS | Performed by: OPTOMETRIST

## 2020-07-16 PROCEDURE — V2784 LENS POLYCARB OR EQUAL: HCPCS | Performed by: OPTOMETRIST

## 2020-07-16 PROCEDURE — V2781 PROGRESSIVE LENS PER LENS: HCPCS | Performed by: OPTOMETRIST

## 2020-07-16 ASSESSMENT — CONFRONTATIONAL VISUAL FIELD TEST (CVF)
OD_FINDINGS: FULL
OS_FINDINGS: FULL

## 2020-07-17 ASSESSMENT — REFRACTION_MANIFEST
OD_AXIS: 90
OS_CYLINDER: -1.25
OS_SPHERE: +3.00
OS_VA1: 20/20
OD_CYLINDER: -1.75
OS_AXIS: 85
OS_ADD: +2.50
OD_ADD: +2.50
OD_SPHERE: +3.25
OD_VA1: 20/20

## 2020-07-17 ASSESSMENT — REFRACTION_AUTOREFRACTION
OS_AXIS: 091
OD_CYLINDER: -1.50
OS_SPHERE: +3.00
OS_CYLINDER: -1.25
OD_AXIS: 085
OD_SPHERE: +3.25

## 2020-07-17 ASSESSMENT — VISUAL ACUITY
OS_BCVA: 20/40
OD_BCVA: 20/25-2

## 2020-07-17 ASSESSMENT — SPHEQUIV_DERIVED
OS_SPHEQUIV: 2.375
OS_SPHEQUIV: 2.375
OD_SPHEQUIV: 2.5
OD_SPHEQUIV: 2.375

## 2020-08-17 ENCOUNTER — APPOINTMENT (OUTPATIENT)
Dept: LAB | Facility: HOSPITAL | Age: 75
End: 2020-08-17
Payer: COMMERCIAL

## 2020-08-17 ENCOUNTER — TRANSCRIBE ORDERS (OUTPATIENT)
Dept: ADMINISTRATIVE | Facility: HOSPITAL | Age: 75
End: 2020-08-17

## 2020-08-17 DIAGNOSIS — D70.4 SHWACHMAN SYNDROME (HCC): ICD-10-CM

## 2020-08-17 DIAGNOSIS — D70.4 SHWACHMAN SYNDROME (HCC): Primary | ICD-10-CM

## 2020-08-17 LAB
ALBUMIN SERPL BCP-MCNC: 4.3 G/DL (ref 3.4–4.8)
ALP SERPL-CCNC: 58.6 U/L (ref 35–140)
ALT SERPL W P-5'-P-CCNC: 15 U/L (ref 5–54)
ANION GAP SERPL CALCULATED.3IONS-SCNC: 6 MMOL/L (ref 4–13)
AST SERPL W P-5'-P-CCNC: 18 U/L (ref 15–41)
BASOPHILS # BLD AUTO: 0.04 THOUSANDS/ΜL (ref 0–0.1)
BASOPHILS NFR BLD AUTO: 1 % (ref 0–1)
BILIRUB SERPL-MCNC: 0.54 MG/DL (ref 0.3–1.2)
BUN SERPL-MCNC: 11 MG/DL (ref 6–20)
CALCIUM SERPL-MCNC: 9.9 MG/DL (ref 8.4–10.2)
CHLORIDE SERPL-SCNC: 105 MMOL/L (ref 96–108)
CO2 SERPL-SCNC: 30 MMOL/L (ref 22–33)
CREAT SERPL-MCNC: 0.74 MG/DL (ref 0.4–1.1)
EOSINOPHIL # BLD AUTO: 0.24 THOUSAND/ΜL (ref 0–0.61)
EOSINOPHIL NFR BLD AUTO: 5 % (ref 0–6)
ERYTHROCYTE [DISTWIDTH] IN BLOOD BY AUTOMATED COUNT: 13.7 % (ref 11.6–15.1)
GFR SERPL CREATININE-BSD FRML MDRD: 81 ML/MIN/1.73SQ M
GLUCOSE P FAST SERPL-MCNC: 96 MG/DL (ref 70–100)
HCT VFR BLD AUTO: 45.2 % (ref 34.8–46.1)
HGB BLD-MCNC: 14.5 G/DL (ref 11.5–15.4)
IMM GRANULOCYTES # BLD AUTO: 0 THOUSAND/UL (ref 0–0.2)
IMM GRANULOCYTES NFR BLD AUTO: 0 % (ref 0–2)
LYMPHOCYTES # BLD AUTO: 2.06 THOUSANDS/ΜL (ref 0.6–4.47)
LYMPHOCYTES NFR BLD AUTO: 47 % (ref 14–44)
MCH RBC QN AUTO: 29.5 PG (ref 26.8–34.3)
MCHC RBC AUTO-ENTMCNC: 32.1 G/DL (ref 31.4–37.4)
MCV RBC AUTO: 92 FL (ref 82–98)
MONOCYTES # BLD AUTO: 0.39 THOUSAND/ΜL (ref 0.17–1.22)
MONOCYTES NFR BLD AUTO: 9 % (ref 4–12)
NEUTROPHILS # BLD AUTO: 1.7 THOUSANDS/ΜL (ref 1.85–7.62)
NEUTS SEG NFR BLD AUTO: 38 % (ref 43–75)
PLATELET # BLD AUTO: 190 THOUSANDS/UL (ref 149–390)
PMV BLD AUTO: 10.3 FL (ref 8.9–12.7)
POTASSIUM SERPL-SCNC: 4.3 MMOL/L (ref 3.5–5)
PROT SERPL-MCNC: 7.6 G/DL (ref 6.4–8.3)
RBC # BLD AUTO: 4.91 MILLION/UL (ref 3.81–5.12)
SODIUM SERPL-SCNC: 141 MMOL/L (ref 133–145)
WBC # BLD AUTO: 4.43 THOUSAND/UL (ref 4.31–10.16)

## 2020-08-17 PROCEDURE — 36415 COLL VENOUS BLD VENIPUNCTURE: CPT | Performed by: PHYSICIAN ASSISTANT

## 2020-08-17 PROCEDURE — 80053 COMPREHEN METABOLIC PANEL: CPT | Performed by: PHYSICIAN ASSISTANT

## 2020-08-17 PROCEDURE — 85025 COMPLETE CBC W/AUTO DIFF WBC: CPT

## 2020-09-01 ENCOUNTER — TRANSCRIBE ORDERS (OUTPATIENT)
Dept: ADMINISTRATIVE | Facility: HOSPITAL | Age: 75
End: 2020-09-01

## 2020-09-01 ENCOUNTER — HOSPITAL ENCOUNTER (OUTPATIENT)
Dept: CT IMAGING | Facility: HOSPITAL | Age: 75
Discharge: HOME | End: 2020-09-01
Payer: COMMERCIAL

## 2020-09-01 DIAGNOSIS — G44.319 ACUTE POST-TRAUMATIC HEADACHE, NOT INTRACTABLE: ICD-10-CM

## 2020-09-01 DIAGNOSIS — G44.319 ACUTE POST-TRAUMATIC HEADACHE, NOT INTRACTABLE: Primary | ICD-10-CM

## 2020-09-01 PROCEDURE — 70450 CT HEAD/BRAIN W/O DYE: CPT

## 2020-09-01 PROCEDURE — G1004 CDSM NDSC: HCPCS

## 2020-09-09 ENCOUNTER — TRANSCRIBE ORDERS (OUTPATIENT)
Dept: ADMINISTRATIVE | Facility: HOSPITAL | Age: 75
End: 2020-09-09

## 2020-09-09 DIAGNOSIS — N64.4 MASTODYNIA: Primary | ICD-10-CM

## 2020-09-25 ENCOUNTER — HOSPITAL ENCOUNTER (OUTPATIENT)
Dept: RADIOLOGY | Facility: HOSPITAL | Age: 75
Discharge: HOME | End: 2020-09-25
Payer: COMMERCIAL

## 2020-09-25 VITALS — BODY MASS INDEX: 26.22 KG/M2 | HEIGHT: 63 IN | WEIGHT: 148 LBS

## 2020-09-25 DIAGNOSIS — N64.4 MASTODYNIA: ICD-10-CM

## 2020-09-25 PROCEDURE — 76642 ULTRASOUND BREAST LIMITED: CPT

## 2020-09-25 PROCEDURE — G0279 TOMOSYNTHESIS, MAMMO: HCPCS

## 2020-09-25 PROCEDURE — 77066 DX MAMMO INCL CAD BI: CPT

## 2020-11-24 ENCOUNTER — OFFICE VISIT (OUTPATIENT)
Dept: ENDOCRINOLOGY | Facility: CLINIC | Age: 75
End: 2020-11-24
Payer: COMMERCIAL

## 2020-11-24 VITALS
SYSTOLIC BLOOD PRESSURE: 116 MMHG | WEIGHT: 155 LBS | TEMPERATURE: 98 F | HEART RATE: 82 BPM | BODY MASS INDEX: 27.46 KG/M2 | DIASTOLIC BLOOD PRESSURE: 70 MMHG

## 2020-11-24 DIAGNOSIS — E03.9 ACQUIRED HYPOTHYROIDISM: ICD-10-CM

## 2020-11-24 DIAGNOSIS — E04.2 MULTINODULAR GOITER: ICD-10-CM

## 2020-11-24 DIAGNOSIS — E04.1 RIGHT THYROID NODULE: ICD-10-CM

## 2020-11-24 DIAGNOSIS — E04.1 LEFT THYROID NODULE: ICD-10-CM

## 2020-11-24 DIAGNOSIS — R09.89 GLOBUS SENSATION: Primary | ICD-10-CM

## 2020-11-24 PROCEDURE — 99204 OFFICE O/P NEW MOD 45 MIN: CPT | Performed by: INTERNAL MEDICINE

## 2020-12-03 ENCOUNTER — TRANSCRIBE ORDERS (OUTPATIENT)
Dept: ADMINISTRATIVE | Facility: HOSPITAL | Age: 75
End: 2020-12-03

## 2020-12-03 DIAGNOSIS — R13.14 DYSPHAGIA, PHARYNGOESOPHAGEAL PHASE: Primary | ICD-10-CM

## 2020-12-04 ENCOUNTER — LAB (OUTPATIENT)
Dept: LAB | Facility: HOSPITAL | Age: 75
End: 2020-12-04
Attending: INTERNAL MEDICINE
Payer: COMMERCIAL

## 2020-12-04 DIAGNOSIS — E03.9 ACQUIRED HYPOTHYROIDISM: ICD-10-CM

## 2020-12-04 LAB
T4 FREE SERPL-MCNC: 1.3 NG/DL (ref 0.76–1.46)
TSH SERPL DL<=0.05 MIU/L-ACNC: 0.55 UIU/ML (ref 0.34–5.6)

## 2020-12-04 PROCEDURE — 86376 MICROSOMAL ANTIBODY EACH: CPT

## 2020-12-04 PROCEDURE — 84443 ASSAY THYROID STIM HORMONE: CPT

## 2020-12-04 PROCEDURE — 36415 COLL VENOUS BLD VENIPUNCTURE: CPT

## 2020-12-04 PROCEDURE — 84439 ASSAY OF FREE THYROXINE: CPT

## 2020-12-04 PROCEDURE — 86800 THYROGLOBULIN ANTIBODY: CPT

## 2020-12-05 LAB
THYROGLOB AB SERPL-ACNC: 3.5 IU/ML (ref 0–0.9)
THYROPEROXIDASE AB SERPL-ACNC: 20 IU/ML (ref 0–34)

## 2020-12-07 ENCOUNTER — TELEPHONE (OUTPATIENT)
Dept: ENDOCRINOLOGY | Facility: CLINIC | Age: 75
End: 2020-12-07

## 2020-12-10 ENCOUNTER — HOSPITAL ENCOUNTER (OUTPATIENT)
Dept: RADIOLOGY | Facility: HOSPITAL | Age: 75
Discharge: HOME | End: 2020-12-10
Payer: COMMERCIAL

## 2020-12-10 DIAGNOSIS — R13.14 DYSPHAGIA, PHARYNGOESOPHAGEAL PHASE: ICD-10-CM

## 2020-12-10 PROCEDURE — 92611 MOTION FLUOROSCOPY/SWALLOW: CPT

## 2020-12-10 PROCEDURE — 74230 X-RAY XM SWLNG FUNCJ C+: CPT

## 2020-12-14 ENCOUNTER — TELEPHONE (OUTPATIENT)
Dept: ENDOCRINOLOGY | Facility: CLINIC | Age: 75
End: 2020-12-14

## 2020-12-15 ENCOUNTER — TELEPHONE (OUTPATIENT)
Dept: ENDOCRINOLOGY | Facility: CLINIC | Age: 75
End: 2020-12-15

## 2020-12-19 ENCOUNTER — APPOINTMENT (OUTPATIENT)
Dept: LAB | Facility: HOSPITAL | Age: 75
End: 2020-12-19
Payer: COMMERCIAL

## 2020-12-19 ENCOUNTER — HOSPITAL ENCOUNTER (OUTPATIENT)
Dept: ULTRASOUND IMAGING | Facility: HOSPITAL | Age: 75
Discharge: HOME | End: 2020-12-19
Attending: INTERNAL MEDICINE
Payer: COMMERCIAL

## 2020-12-19 ENCOUNTER — TRANSCRIBE ORDERS (OUTPATIENT)
Dept: LAB | Facility: HOSPITAL | Age: 75
End: 2020-12-19

## 2020-12-19 ENCOUNTER — APPOINTMENT (OUTPATIENT)
Dept: LAB | Facility: HOSPITAL | Age: 75
End: 2020-12-19
Attending: INTERNAL MEDICINE
Payer: COMMERCIAL

## 2020-12-19 DIAGNOSIS — E04.1 LEFT THYROID NODULE: ICD-10-CM

## 2020-12-19 DIAGNOSIS — R19.5 ABNORMAL FECES: ICD-10-CM

## 2020-12-19 DIAGNOSIS — R19.5 ABNORMAL FECES: Primary | ICD-10-CM

## 2020-12-19 PROCEDURE — 36415 COLL VENOUS BLD VENIPUNCTURE: CPT

## 2020-12-19 PROCEDURE — 87338 HPYLORI STOOL AG IA: CPT

## 2020-12-19 PROCEDURE — 76536 US EXAM OF HEAD AND NECK: CPT

## 2020-12-21 LAB — H PYLORI AG STL QL IA: NEGATIVE

## 2020-12-22 ENCOUNTER — TELEPHONE (OUTPATIENT)
Dept: ENDOCRINOLOGY | Facility: CLINIC | Age: 75
End: 2020-12-22

## 2021-01-04 ENCOUNTER — PREP FOR PROCEDURE (OUTPATIENT)
Dept: GASTROENTEROLOGY | Facility: CLINIC | Age: 76
End: 2021-01-04

## 2021-01-04 DIAGNOSIS — K21.9 GASTROESOPHAGEAL REFLUX DISEASE WITHOUT ESOPHAGITIS: Primary | ICD-10-CM

## 2021-01-15 ENCOUNTER — HOSPITAL ENCOUNTER (OUTPATIENT)
Dept: GASTROENTEROLOGY | Facility: AMBULATORY SURGERY CENTER | Age: 76
Discharge: HOME | End: 2021-01-15
Payer: COMMERCIAL

## 2021-01-15 ENCOUNTER — ANESTHESIA (OUTPATIENT)
Dept: GASTROENTEROLOGY | Facility: AMBULATORY SURGERY CENTER | Age: 76
End: 2021-01-15

## 2021-01-15 ENCOUNTER — ANESTHESIA EVENT (OUTPATIENT)
Dept: GASTROENTEROLOGY | Facility: AMBULATORY SURGERY CENTER | Age: 76
End: 2021-01-15

## 2021-01-15 VITALS
DIASTOLIC BLOOD PRESSURE: 86 MMHG | RESPIRATION RATE: 18 BRPM | WEIGHT: 150 LBS | HEIGHT: 63 IN | HEART RATE: 81 BPM | BODY MASS INDEX: 26.58 KG/M2 | SYSTOLIC BLOOD PRESSURE: 157 MMHG | OXYGEN SATURATION: 99 % | TEMPERATURE: 97 F

## 2021-01-15 DIAGNOSIS — K21.9 GASTROESOPHAGEAL REFLUX DISEASE WITHOUT ESOPHAGITIS: ICD-10-CM

## 2021-01-15 PROBLEM — E03.9 HYPOTHYROIDISM: Status: ACTIVE | Noted: 2021-01-15

## 2021-01-15 PROCEDURE — 00731 ANES UPR GI NDSC PX NOS: CPT | Performed by: NURSE ANESTHETIST, CERTIFIED REGISTERED

## 2021-01-15 PROCEDURE — 43239 EGD BIOPSY SINGLE/MULTIPLE: CPT | Performed by: INTERNAL MEDICINE

## 2021-01-15 PROCEDURE — 99100 ANES PT EXTEME AGE<1 YR&>70: CPT | Performed by: NURSE ANESTHETIST, CERTIFIED REGISTERED

## 2021-01-15 RX ORDER — SODIUM CHLORIDE 9 MG/ML
50 INJECTION, SOLUTION INTRAVENOUS CONTINUOUS
Status: DISCONTINUED | OUTPATIENT
Start: 2021-01-15 | End: 2021-01-19 | Stop reason: HOSPADM

## 2021-01-15 RX ORDER — OMEGA-3S/DHA/EPA/FISH OIL/D3 300MG-1000
400 CAPSULE ORAL EVERY MORNING
COMMUNITY

## 2021-01-15 RX ORDER — UBIDECARENONE 75 MG
CAPSULE ORAL DAILY
COMMUNITY

## 2021-01-15 RX ORDER — LORATADINE 10 MG/1
10 TABLET ORAL 2 TIMES DAILY PRN
COMMUNITY
End: 2022-01-25 | Stop reason: HOSPADM

## 2021-01-15 RX ORDER — CHLORAL HYDRATE 500 MG
1000 CAPSULE ORAL EVERY MORNING
COMMUNITY

## 2021-01-15 RX ORDER — PROPOFOL 10 MG/ML
INJECTION, EMULSION INTRAVENOUS AS NEEDED
Status: DISCONTINUED | OUTPATIENT
Start: 2021-01-15 | End: 2021-01-15

## 2021-01-15 RX ORDER — SODIUM CHLORIDE 9 MG/ML
INJECTION, SOLUTION INTRAVENOUS CONTINUOUS PRN
Status: DISCONTINUED | OUTPATIENT
Start: 2021-01-15 | End: 2021-01-15

## 2021-01-15 RX ORDER — LIDOCAINE HYDROCHLORIDE 10 MG/ML
INJECTION, SOLUTION EPIDURAL; INFILTRATION; INTRACAUDAL; PERINEURAL AS NEEDED
Status: DISCONTINUED | OUTPATIENT
Start: 2021-01-15 | End: 2021-01-15

## 2021-01-15 RX ADMIN — LIDOCAINE HYDROCHLORIDE 100 MG: 10 INJECTION, SOLUTION EPIDURAL; INFILTRATION; INTRACAUDAL; PERINEURAL at 08:18

## 2021-01-15 RX ADMIN — PROPOFOL 200 MG: 10 INJECTION, EMULSION INTRAVENOUS at 08:18

## 2021-01-15 RX ADMIN — SODIUM CHLORIDE: 9 INJECTION, SOLUTION INTRAVENOUS at 07:57

## 2021-01-15 NOTE — ANESTHESIA PREPROCEDURE EVALUATION
Procedure:  EGD    Relevant Problems   ENDO   (+) Hypothyroidism      NEURO/PSYCH   (+) History of Helicobacter pylori infection   (+) History of colon polyps        Physical Exam    Airway    Mallampati score: II         Dental   No notable dental hx     Cardiovascular  Cardiovascular exam normal    Pulmonary  Pulmonary exam normal     Other Findings        Anesthesia Plan  ASA Score- 2     Anesthesia Type- IV sedation with anesthesia with ASA Monitors  Additional Monitors:   Airway Plan:           Plan Factors-Exercise tolerance (METS): >4 METS  Chart reviewed  Existing labs reviewed  Patient is not a current smoker  Induction-     Postoperative Plan-     Informed Consent- Anesthetic plan and risks discussed with patient

## 2021-01-15 NOTE — H&P
History and Physical - [facility]  Joeseph Kawasaki 77 y o  female MRN: [id number]                  HPI: Joeseph Kawasaki is a 77y o  year old female who presents for GERD epigastric pain      REVIEW OF SYSTEMS: Per the HPI, and otherwise unremarkable      Historical Information   Past Medical History:   Diagnosis Date    Arthritis     Borderline diabetes     diet controlled    Colon polyp     Depression     Disease of thyroid gland     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    DVT (deep venous thrombosis) (HCC)     hx- left leg    Pulmonary emboli (HCC)     during pregnancy    Rash     arms on occasion, cause unknown     Past Surgical History:   Procedure Laterality Date    COLONOSCOPY      DILATION AND CURETTAGE, DIAGNOSTIC / THERAPEUTIC      THYROIDECTOMY, PARTIAL      2020     Social History   Social History     Substance and Sexual Activity   Alcohol Use Yes    Frequency: Monthly or less    Drinks per session: 1 or 2    Binge frequency: Never    Comment: social rare     Social History     Substance and Sexual Activity   Drug Use Never     Social History     Tobacco Use   Smoking Status Never Smoker   Smokeless Tobacco Never Used     Family History   Problem Relation Age of Onset    No Known Problems Mother     Cancer Sister [de-identified]    No Known Problems Daughter     No Known Problems Sister     No Known Problems Maternal Aunt     No Known Problems Maternal Aunt     No Known Problems Paternal Aunt     No Known Problems Paternal Aunt     Breast cancer Other 59       Meds/Allergies       Current Outpatient Medications:     Ascorbic Acid (VITAMIN C) 500 MG CHEW    cholecalciferol (VITAMIN D3) 400 units tablet    cyanocobalamin (VITAMIN B-12) 100 mcg tablet    levothyroxine 100 mcg tablet    loratadine (CLARITIN) 10 mg tablet    Omega-3 Fatty Acids (fish oil) 1,000 mg    vitamin E 100 UNIT capsule    Current Facility-Administered Medications:     sodium chloride 0 9 % bolus 500 mL, 500 mL, Intravenous, Once    Facility-Administered Medications Ordered in Other Encounters:     sodium chloride 0 9 % infusion, , , Continuous PRN, New Bag at 01/15/21 0757    Allergies   Allergen Reactions    Crab (Diagnostic) Itching and Rash    Penicillins Rash       Objective     /95   Pulse 79   Resp 18   Ht 5' 3" (1 6 m)   Wt 68 kg (150 lb)   SpO2 99%   BMI 26 57 kg/m²       PHYSICAL EXAM    Gen: NAD  CV: RRR  CHEST: Clear  ABD: soft, NT/ND  EXT: no edema      ASSESSMENT/PLAN:  This is a 77y o  year old female here for EGD, and she is stable and optimized for her procedure

## 2021-01-15 NOTE — ANESTHESIA POSTPROCEDURE EVALUATION
Post-Op Assessment Note    CV Status:  Stable  Pain Score: 0       Mental Status:  Arousable   PONV Controlled:  None      Post Op Vitals Reviewed: Yes      Staff: CRNA         No complications documented      BP      Temp      Pulse     Resp      SpO2

## 2021-01-21 ENCOUNTER — HOSPITAL ENCOUNTER (OUTPATIENT)
Dept: RADIOLOGY | Facility: HOSPITAL | Age: 76
Discharge: HOME | End: 2021-01-21
Attending: INTERNAL MEDICINE
Payer: COMMERCIAL

## 2021-01-21 ENCOUNTER — TRANSCRIBE ORDERS (OUTPATIENT)
Dept: ADMINISTRATIVE | Facility: HOSPITAL | Age: 76
End: 2021-01-21

## 2021-01-21 DIAGNOSIS — Z00.01 ENCOUNTER FOR GENERAL ADULT MEDICAL EXAMINATION WITH ABNORMAL FINDINGS: ICD-10-CM

## 2021-01-21 DIAGNOSIS — Z00.01 ENCOUNTER FOR GENERAL ADULT MEDICAL EXAMINATION WITH ABNORMAL FINDINGS: Primary | ICD-10-CM

## 2021-01-21 PROCEDURE — 71046 X-RAY EXAM CHEST 2 VIEWS: CPT

## 2021-02-23 ENCOUNTER — IMMUNIZATIONS (OUTPATIENT)
Dept: FAMILY MEDICINE CLINIC | Facility: HOSPITAL | Age: 76
End: 2021-02-23

## 2021-02-23 DIAGNOSIS — Z23 ENCOUNTER FOR IMMUNIZATION: Primary | ICD-10-CM

## 2021-02-23 PROCEDURE — 91300 SARS-COV-2 / COVID-19 MRNA VACCINE (PFIZER-BIONTECH) 30 MCG: CPT

## 2021-02-23 PROCEDURE — 0001A SARS-COV-2 / COVID-19 MRNA VACCINE (PFIZER-BIONTECH) 30 MCG: CPT

## 2021-03-14 ENCOUNTER — IMMUNIZATIONS (OUTPATIENT)
Dept: FAMILY MEDICINE CLINIC | Facility: HOSPITAL | Age: 76
End: 2021-03-14

## 2021-03-14 DIAGNOSIS — Z23 ENCOUNTER FOR IMMUNIZATION: Primary | ICD-10-CM

## 2021-03-14 PROCEDURE — 91300 SARS-COV-2 / COVID-19 MRNA VACCINE (PFIZER-BIONTECH) 30 MCG: CPT

## 2021-03-14 PROCEDURE — 0002A SARS-COV-2 / COVID-19 MRNA VACCINE (PFIZER-BIONTECH) 30 MCG: CPT

## 2021-03-15 ENCOUNTER — OFFICE VISIT (OUTPATIENT)
Dept: PULMONOLOGY | Facility: CLINIC | Age: 76
End: 2021-03-15
Payer: COMMERCIAL

## 2021-03-15 VITALS
SYSTOLIC BLOOD PRESSURE: 128 MMHG | DIASTOLIC BLOOD PRESSURE: 80 MMHG | BODY MASS INDEX: 27.46 KG/M2 | RESPIRATION RATE: 18 BRPM | OXYGEN SATURATION: 100 % | HEART RATE: 98 BPM | WEIGHT: 155 LBS | HEIGHT: 63 IN | TEMPERATURE: 97.3 F

## 2021-03-15 DIAGNOSIS — G47.33 OSA (OBSTRUCTIVE SLEEP APNEA): Primary | ICD-10-CM

## 2021-03-15 DIAGNOSIS — I26.99 OTHER PULMONARY EMBOLISM WITHOUT ACUTE COR PULMONALE, UNSPECIFIED CHRONICITY (HCC): ICD-10-CM

## 2021-03-15 PROBLEM — E89.0 POSTOPERATIVE HYPOTHYROIDISM: Status: ACTIVE | Noted: 2021-03-15

## 2021-03-15 PROCEDURE — 99204 OFFICE O/P NEW MOD 45 MIN: CPT | Performed by: INTERNAL MEDICINE

## 2021-03-15 NOTE — PROGRESS NOTES
Pulmonary Initial Visit  Shobha Haile 77 y o  female MRN: [id number]  @ Encounter: [id number]      Impressions/Recommendations:   Patient is a 78-year-old  Female with past medical history significant for hypothyroidism, remote history of pulmonary embolus who presents to establish pulmonary care  The patient has noted for many years she has had apneic events  As told to her by her partners  The patient does report history of excessive daytime sleepiness which she attributed to her hypothyroidism  Obstructive sleep apnea  -   Check home sleep study    Remote history of pulmonary embolism  -   Currently requiring only aspirin 81 mg    Hypothyroidism  -  Normal TSH   -  Continue synthroid     patient may follow up in 6 months or following her sleep study  Orders Placed This Encounter   Procedures    Home Study     Standing Status:   Future     Standing Expiration Date:   3/15/2025     Order Specific Question:   Exclusions: Answer:   None of the above     Order Specific Question:   Sleep History/Symptoms: (Must have at least one other symptoms than snoring )     Answer:   Snoring     Order Specific Question:   Sleep History/Symptoms: (Must have at least one other symptoms than snoring )     Answer:   Excessive Daytime Sleepiness     Order Specific Question:   Sleep History/Symptoms: (Must have at least one other symptoms than snoring )     Answer:    Witnessed apneas     Order Specific Question:   Sleep History/Symptoms: (Must have at least one other symptoms than snoring )     Answer:   Unrefreshing sleep     Order Specific Question:   Service requested:     Answer:   Referring provider to provide patient f/u     Order Specific Question:   Preference for reading physician (Leave blank if no preference )     Answer:   [facility]     Order Specific Question:   Select Preferred Provider (Leave blank if no preference )     Answer:   [facility]       History of Present Illness   HPI:  Shobha Haile is a 77 y o  female with pmhx sig for hypothyroidism who presents to establish pulmonary care  The patient was told by her partners that she stops breathing while she is sleeping  They tell her that stops breathing  The patient reports night time awakenings frequently; she feels like she has to take a breath  She does not use any inhalers or nebulizers  She does report excessive daytime sleepiness which she initially attributed to her synthroid  The patient denies shortness of breath with daily activities  She is able to walk several miles at a slower pace  She is not avoiding activities because of her breathing  The patient previously worked as an LPN  She has a remote history of pulmonary embolism for which she is taking an 81mg aspirin  She denies etoh or tobacco abuse  She notes that her son has a similar problem  Her daughter passed away at age 24 from an asthma attack  Review of systems:  Review of systems was completed and was otherwise negative except as listed in HPI      Historical Information   Past Medical History:   Diagnosis Date    Arthritis     Borderline diabetes     diet controlled    Colon polyp     Depression     Disease of thyroid gland     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    DVT (deep venous thrombosis) (HCC)     hx- left leg    Pulmonary emboli (HCC)     during pregnancy    Rash     arms on occasion, cause unknown     Past Surgical History:   Procedure Laterality Date    COLONOSCOPY      DILATION AND CURETTAGE, DIAGNOSTIC / THERAPEUTIC      THYROIDECTOMY, PARTIAL      2020     Family History   Problem Relation Age of Onset    No Known Problems Mother     Cancer Sister [de-identified]   Willena Rand No Known Problems Daughter     No Known Problems Sister     No Known Problems Maternal Aunt     No Known Problems Maternal Aunt     No Known Problems Paternal Aunt     No Known Problems Paternal Aunt     Breast cancer Other 59       Social History     Socioeconomic History    Marital status:      Spouse name: None    Number of children: None    Years of education: None    Highest education level: None   Occupational History    None   Social Needs    Financial resource strain: None    Food insecurity     Worry: None     Inability: None    Transportation needs     Medical: None     Non-medical: None   Tobacco Use    Smoking status: Never Smoker    Smokeless tobacco: Never Used   Substance and Sexual Activity    Alcohol use: Yes     Frequency: Monthly or less     Drinks per session: 1 or 2     Binge frequency: Never     Comment: social rare    Drug use: Never    Sexual activity: None   Lifestyle    Physical activity     Days per week: None     Minutes per session: None    Stress: None   Relationships    Social connections     Talks on phone: None     Gets together: None     Attends Ten[employer] service: None     Active member of club or organization: None     Attends meetings of clubs or organizations: None     Relationship status: None    Intimate partner violence     Fear of current or ex partner: None     Emotionally abused: None     Physically abused: None     Forced sexual activity: None   Other Topics Concern    None   Social History Narrative    None       Meds/Allergies   No current facility-administered medications for this visit  (Not in a hospital admission)    Allergies   Allergen Reactions    Crab (Diagnostic) Itching and Rash    Penicillins Rash       Vitals: Blood pressure 128/80, pulse 98, temperature (!) 97 3 °F (36 3 °C), temperature source Tympanic, resp  rate 18, height 5' 3" (1 6 m), weight 70 3 kg (155 lb), SpO2 100 % , RA, Body mass index is 27 46 kg/m²      Physical Exam  General: Pleasant, Awake alert and oriented x 3, conversant without conversational dyspnea, NAD, normal affect  HEENT:  PERRL, Sclera noninjected, nonicteric OU, Nares patent, no nasal flaring, no nasal drainage, Mucous membranes, moist, no oral lesions, normal dentition  NECK: Trachea midline, no accessory muscle use, no stridor, no cervical or supraclavicular adenopathy, JVP not elevated  CARDIAC: Reg, single s1/S2, no m/r/g  PULM: CTA b/l  CHEST: No gross deformities, equal chest expansion on inspiration bilaterally  ABD: Normoactive bowel sounds, soft nontender, nondistended, no rebound, no rigidity, no guarding  EXT: No cyanosis, no clubbing, no edema, normal capillary refill  SKIN:  No rashes, no lesions  NEURO: no focal neurologic deficits, AAOx3, moving all extremities appropriately    Labs: I have personally reviewed pertinent lab results  Lab Results   Component Value Date    SODIUM 141 08/17/2020    K 4 3 08/17/2020     08/17/2020    CO2 30 08/17/2020    BUN 11 08/17/2020    CREATININE 0 74 08/17/2020    GLUC 98 07/02/2020    CALCIUM 9 9 08/17/2020     Lab Results   Component Value Date    WBC 4 43 08/17/2020    HGB 14 5 08/17/2020    HCT 45 2 08/17/2020    MCV 92 08/17/2020     08/17/2020     Imaging and other studies: I have personally reviewed pertinent reports  and I have personally reviewed pertinent films in PACS  CXR 1/21/21:  Cardiomediastinal silhouette appears unremarkable  No acute disease  No effusion or pneumothorax  Minimal benign pleural thickening in the left costophrenic angle  Osseous structures appear within normal limits for patient age  Pulmonary function testing:    No pulmonary function testing available for review  Echocardiogram: I have personally reviewed pertinent reports  2/16/2016:     Visually Estimated LV Ejection Fraction Pre-Exercise is:60%     Echo Analysis at Rest     Normal left ventricular function      All myocardial segments contract normally at rest      Echo Analysis Post Exercise     Overall left ventricular function improves with exercise and chamber size      decreases with exercise      All myocardial segments contract normally at exercise       EKG, Pathology, and Other Studies: I have personally reviewed pertinent reports  and I have personally reviewed pertinent films in PACS    Vandana Shore

## 2021-03-19 ENCOUNTER — TELEPHONE (OUTPATIENT)
Dept: SLEEP CENTER | Facility: CLINIC | Age: 76
End: 2021-03-19

## 2021-03-19 NOTE — TELEPHONE ENCOUNTER
----- Message from Janet Lane MD sent at 3/17/2021  5:38 PM EDT -----   approved  ----- Message -----  From: Sue Sky  Sent: 5/82/2660  12:47 PM EDT  To: Sleep Medicine [facility] Provider    This sleep study needs approval      If approved please sign and return to clerical pool  If denied please include reasons why  Also provide alternative testing if warranted  Please sign and return to clerical pool

## 2021-03-24 ENCOUNTER — APPOINTMENT (OUTPATIENT)
Dept: LAB | Facility: HOSPITAL | Age: 76
End: 2021-03-24
Payer: COMMERCIAL

## 2021-03-24 ENCOUNTER — TRANSCRIBE ORDERS (OUTPATIENT)
Dept: ADMINISTRATIVE | Facility: HOSPITAL | Age: 76
End: 2021-03-24

## 2021-03-24 DIAGNOSIS — R21 RASH AND OTHER NONSPECIFIC SKIN ERUPTION: Primary | ICD-10-CM

## 2021-03-24 DIAGNOSIS — R21 RASH AND OTHER NONSPECIFIC SKIN ERUPTION: ICD-10-CM

## 2021-03-24 DIAGNOSIS — D70.4 CYCLICAL NEUTROPENIA (HCC): ICD-10-CM

## 2021-03-24 LAB
ALBUMIN SERPL BCP-MCNC: 4.2 G/DL (ref 3.4–4.8)
ALP SERPL-CCNC: 56.7 U/L (ref 35–140)
ALT SERPL W P-5'-P-CCNC: 17 U/L (ref 5–54)
ANION GAP SERPL CALCULATED.3IONS-SCNC: 4 MMOL/L (ref 4–13)
AST SERPL W P-5'-P-CCNC: 19 U/L (ref 15–41)
BASOPHILS # BLD AUTO: 0.04 THOUSANDS/ΜL (ref 0–0.1)
BASOPHILS NFR BLD AUTO: 1 % (ref 0–1)
BILIRUB SERPL-MCNC: 0.76 MG/DL (ref 0.3–1.2)
BUN SERPL-MCNC: 19 MG/DL (ref 6–20)
CALCIUM SERPL-MCNC: 9.6 MG/DL (ref 8.4–10.2)
CHLORIDE SERPL-SCNC: 107 MMOL/L (ref 96–108)
CO2 SERPL-SCNC: 31 MMOL/L (ref 22–33)
CREAT SERPL-MCNC: 0.83 MG/DL (ref 0.4–1.1)
EOSINOPHIL # BLD AUTO: 0.18 THOUSAND/ΜL (ref 0–0.61)
EOSINOPHIL NFR BLD AUTO: 5 % (ref 0–6)
ERYTHROCYTE [DISTWIDTH] IN BLOOD BY AUTOMATED COUNT: 13.7 % (ref 11.6–15.1)
GFR SERPL CREATININE-BSD FRML MDRD: 70 ML/MIN/1.73SQ M
GLUCOSE P FAST SERPL-MCNC: 97 MG/DL (ref 70–105)
HCT VFR BLD AUTO: 45.1 % (ref 34.8–46.1)
HGB BLD-MCNC: 14.2 G/DL (ref 11.5–15.4)
IMM GRANULOCYTES # BLD AUTO: 0 THOUSAND/UL (ref 0–0.2)
IMM GRANULOCYTES NFR BLD AUTO: 0 % (ref 0–2)
LYMPHOCYTES # BLD AUTO: 2.26 THOUSANDS/ΜL (ref 0.6–4.47)
LYMPHOCYTES NFR BLD AUTO: 60 % (ref 14–44)
MCH RBC QN AUTO: 28.7 PG (ref 26.8–34.3)
MCHC RBC AUTO-ENTMCNC: 31.5 G/DL (ref 31.4–37.4)
MCV RBC AUTO: 91 FL (ref 82–98)
MONOCYTES # BLD AUTO: 0.35 THOUSAND/ΜL (ref 0.17–1.22)
MONOCYTES NFR BLD AUTO: 9 % (ref 4–12)
NEUTROPHILS # BLD AUTO: 0.94 THOUSANDS/ΜL (ref 1.85–7.62)
NEUTS SEG NFR BLD AUTO: 25 % (ref 43–75)
PLATELET # BLD AUTO: 186 THOUSANDS/UL (ref 149–390)
PMV BLD AUTO: 10.2 FL (ref 8.9–12.7)
POTASSIUM SERPL-SCNC: 4.3 MMOL/L (ref 3.5–5)
PROT SERPL-MCNC: 7.7 G/DL (ref 6.4–8.3)
RBC # BLD AUTO: 4.94 MILLION/UL (ref 3.81–5.12)
SODIUM SERPL-SCNC: 142 MMOL/L (ref 133–145)
WBC # BLD AUTO: 3.77 THOUSAND/UL (ref 4.31–10.16)

## 2021-03-24 PROCEDURE — 82785 ASSAY OF IGE: CPT

## 2021-03-24 PROCEDURE — 36415 COLL VENOUS BLD VENIPUNCTURE: CPT

## 2021-03-24 PROCEDURE — 80053 COMPREHEN METABOLIC PANEL: CPT

## 2021-03-24 PROCEDURE — 85025 COMPLETE CBC W/AUTO DIFF WBC: CPT

## 2021-03-25 LAB — TOTAL IGE SMQN RAST: 33 KU/L (ref 0–113)

## 2021-04-14 ENCOUNTER — TELEPHONE (OUTPATIENT)
Dept: SLEEP CENTER | Facility: CLINIC | Age: 76
End: 2021-04-14

## 2021-05-21 ENCOUNTER — OFFICE VISIT (OUTPATIENT)
Dept: OBGYN CLINIC | Facility: CLINIC | Age: 76
End: 2021-05-21
Payer: COMMERCIAL

## 2021-05-21 ENCOUNTER — APPOINTMENT (OUTPATIENT)
Dept: RADIOLOGY | Facility: AMBULARY SURGERY CENTER | Age: 76
End: 2021-05-21
Attending: ORTHOPAEDIC SURGERY
Payer: COMMERCIAL

## 2021-05-21 VITALS
BODY MASS INDEX: 27.25 KG/M2 | HEART RATE: 88 BPM | DIASTOLIC BLOOD PRESSURE: 78 MMHG | RESPIRATION RATE: 18 BRPM | SYSTOLIC BLOOD PRESSURE: 124 MMHG | HEIGHT: 63 IN | WEIGHT: 153.8 LBS

## 2021-05-21 DIAGNOSIS — M21.611 BUNION OF RIGHT FOOT: ICD-10-CM

## 2021-05-21 DIAGNOSIS — M21.611 BUNION OF RIGHT FOOT: Primary | ICD-10-CM

## 2021-05-21 DIAGNOSIS — M20.11 ACQUIRED HALLUX INTERPHALANGEUS, RIGHT: ICD-10-CM

## 2021-05-21 PROCEDURE — 73630 X-RAY EXAM OF FOOT: CPT

## 2021-05-21 PROCEDURE — 99204 OFFICE O/P NEW MOD 45 MIN: CPT | Performed by: ORTHOPAEDIC SURGERY

## 2021-05-21 RX ORDER — CHLORHEXIDINE GLUCONATE 0.12 MG/ML
15 RINSE ORAL ONCE
Status: CANCELLED | OUTPATIENT
Start: 2021-05-21 | End: 2021-05-21

## 2021-05-21 RX ORDER — CHLORHEXIDINE GLUCONATE 4 G/100ML
SOLUTION TOPICAL DAILY PRN
Status: CANCELLED | OUTPATIENT
Start: 2021-05-21

## 2021-05-21 NOTE — PATIENT INSTRUCTIONS
JOHN Marie  Attending, [address] [place] [address] Office Phone: [phone] ? Fax: [phone]  [address] [place] [address] Office Phone: [phone] ? JPR:[phone]    : [place]) [place], [place]     Surgery Coordinators [place]: Karen Lam, [address] [facility], [phone]  Surgery Coordinator [place]:  Christiano Gambino 34, [phone]  www [email or web address]   PRE-OPERATIVE AND POST-OPERATIVE INSTRUCTIONS    General Information:   Your surgery is with Dr Wilfredo Pham  Dates can change (although rare) depending on emergencies   Typical post operative visits are at the following intervals:  3 weeks post surgery(except 1 week for bunions and wound monitoring), 6 weeks post surgery, 3 months post surgery, 6 months post surgery, and then on a yearly basis  However, this may change based on Dr Tanisha Oliva recommendation   #1 post-operative rule for foot/ankle surgery:  ONCE YOU ARE OUT OF YOUR CAST AND/OR REMOVABLE BOOT, SWELLING MAY PERSIST FOR MANY MONTHS  YOU MIGHT ALSO EXPERIENCE A BLUISH DISCOLORATION OF YOUR LEG  THIS IS NORMAL AND PART OF THE USUAL POSTOPERATIVE EXPERIENCE  SMOKING:   Smoking results in incomplete healing of fractures (broken bones) and joints that my have been fused  Smoking and nicotine also prevents the growth of bone into ankle replacements and bone healing  It also slows the healing of muscles and skin (soft tissue)  Therefore, please do not have surgery if you continue to smoke  We reserve the right to cancel your surgery if we suspect that you are smoking  DO NOT use nicorette gum or other patches  Please find an alternative method to quit smoking before your surgery  Pre-Operative Information:   Surgery date and preoperative visits:  a   If you have medical problems, such as an abnormal EKG, history of BLOOD CLOT, ANEURYSM, and any other heart condition, please inform us so that we can get your medical clearance several weeks before the surgery  Please bring any important medical information, such as an EKG, chest x-ray, or echocardiogram, with you to ensure that your surgery will not be delayed  b  If needed, you will receive your preoperative appointments in the mail or by phone from our scheduling office  The location of the preoperative appointment will be given to you also   c  You may not eat after midnight the night before surgery  If you do, your surgery will be cancelled  d   Joselin Bridges will receive a phone call from your surgery center the day before your surgery (if your surgery is on a Monday, you will get a call the Friday before)  If you do not hear from someone by 4pm the day before your surgery, please call the Surgical coordinator (number above) to notify us   e  Start taking Vitamin D3 4000 units per day and Calcium 1200mg per day immediately  You will continue this until your 3 month post-op visit  These are over the counter and available at all pharmacies and supermarkets  f  FOR THOSE HAVING SURGERY AT [address] [place] [address] WILL NEED CRUTCHES OR A ROLLING WALKER AFTER SURGERY, ASK FOR A PRESCRIPTION FOR THIS FROM OUR OFFICE TODAY  THIS CANNOT BE HANDLED THE DAY OF SURGERY AS [facility] DOES NOT STOCK THESE   Because bacterial can often enter any defect in the skin, it is important to avoid any cuts before surgery  Any breaks in the skin on the leg will often result in your surgery being postponed  Please avoid going on a very long walk the day prior to surgery, or doing other activities that could lead to irritation of the skin, including yard work, extra athletic activity, or shaving  This could result in surgery cancellation   You MUST be fasting the day of your surgery    Therefore, please do not consume any foot or beverage after midnight the night before surgery  The morning of surgery you may take your usual medications with a sip of water   It is important not to take anti-inflammatory medication like Ibuprofen, Motrin, Naproxen (Aleve), or Aspirin 7-10 days before surgery because they will make you bleed more than usual   Vitamin, E, Plavix and Coumadin also have the same effect  Stop Aspirin and Vitamin E two weeks before surgery  YOUR MEDICAL DOCTOR SHOULD TELL YOU WHEN TO STOP COUMADIN OR PLAVIX   If your surgery involves any bone healing, please do not take anti-inflammatories for at least 6 weeks after surgery  This can impede bone healing (ibuprofen, Aleve, Relafen, iodine)  Tylenol is fine to take  PREOPERATIVE BATHING INSTRUCTIONS:     Before your surgery, bathe with Hibiclens (4% Chlorhexidene) as instructed below  This skin cleanser will help reduce the bacteria on your skin before surgery  To avoid irritating your eyes, do not apply Hibiclens above the level of your neck   o On the evening before AND the morning of surgery, bathe your entire body except the face and scalp, then rinse freely  o DO NOT apply to your face or scalp, as Hibiclens can irritate your eyes   Purchasing information:   Hibiclens is available without a prescription at [facility]  ADDITIONAL INSTRUCTIONS:  PATIENTS HAVING FOOT/ANKLE SURGERY     In preparation for your upcoming surgery, we kindly request and advise the following:   Notify our office if you are taking any of the following:  Coumadin (warfarin):  Persantine (dipyridamole); Pletal (cilostazol); Plavix (clopidogrel); Ticlid (ticlopidine); Agrylin (anagrelide); Aggrenox (dipyridamole and aspirin) or other blood thinners,   In addition, stop taking Vitamin E and herbal supplements   Do not schedule any elective dental work for at least 6 months after surgery    If you had an ankle replacement, you will need to take antibiotics before any future dental procedures  Your dentist or our office can prescribe these for you  1000mg of Amoxicillin 1 hour prior to any dental procedure is the recommended dosing  THREE RULES:    1  After surgery you will most likely be given the instructions KEEP YOUR TOES ABOVE YOUR NOSE    This means that you MUST have your feet elevated higher than your heart  Keeping your toes above your nose helps to heal the muscles and skin (soft tissues) by reducing swelling in your leg  This position also helps to prevent infection, and is very important in avoiding deep venous thrombosis (blood clots)  2  In order to keep the blood circulating in your legs and in order to avoid deep vein   thrombosis (blood clots), we ask patients to GET UP ONCE AN HOUR during the day  This means you should at least cross the room and come back  It does not mean you have to be up for long periods of time  In most cases we will not have people immediately put any weight on their operated part  This is important to prevent loosening of metal or other devices holding the bones together  It also prevents irritation of the soft tissues which can lead to prolonged healing  When we say get up once an hour, please walk, hop or move with an assisted device  This is important! 3  Do not do any excessive walking during the first few days after surgery  Recovering from surgery is a full-time task for the patient  Postoperative care is important to avoid irritating the skin incision, which can lead to infection  Please do not plan activities or go out of town for several weeks after surgery  If you are unsure about your future activities, please schedule surgery only when you know it is acceptable for you  Scheduling surgery and then canceling the date, prevents other people from having surgery on that date as it takes time to line everything up effectively    If you cancel your surgery the week of your planned surgery, we reserve the right to cancel all future surgical procedures  THE DAY OF SURGERY:     Arrival to the hospital or outpatient surgical center on time is imperative  If you arrive late, then your surgery will be cancelled  You MUST have a family member/friend bring you, stay with you throughout the DURATION of your surgery, and drive you home   You MUST be fasting the day of your surgery  Therefore, do not consume any food or beverage after midnight the night before surgery  At your pre-operative visit with the anesthesia staff, or during your phone screen, a nurse will instruct you what medications you will need to take the day of surgery   MAKE SURE THAT THE PHARMACY LISTED IN THE ELECTRONIC MEDICAL RECORD (EPIC) IS YOUR PREFERRED PHARMACY  For example, if you are staying with family or a friend, and will not be near your preferred pharmacy, YOU MUST, tell the nurses checking you in the day of surgery so that this can be changed in the system  If your prescriptions are sent to a pharmacy, this cannot be changed  AFTER YOUR SURGERY:   Bleeding through the bandage almost always occurs  Do not let this alarm you  Simply add more gauze or a towel, call us, and come in for a dressing change  If you think it is excessive, contact us immediately or go to the local emergency room   Do not get the bandage wet  Showering is possible with plastic protectors  Be very careful, as the bathroom can be wet and slippery  If you do get your dressing wet, it should be changed immediately  Please contact us   ONCE YOUR ARE OUT OF YOUR CAST AND/OR REMOVABLE BOOT, SWELLING MAY PERSIST FOR MANY MONTHS  YOU MIGHT ALSO EXPERIENCE A BLUISH DISCOLORATION OF YOUR LEG  THIS IS NORMAL AND PART OF THE USUAL POSTOPERATIVE EXPERIENCE  WEARING COMPRESSION HOSE (ELASTIC STOCKINGS) CAN HELP AVOID SOME OF THIS SWELLING        DRESSING:   The purpose of the surgical dressing is to keep your wound and the surgical site protected from the environment  Most dressings contain splints, which help to hold your foot and ankle in a corrected position, and also allow the surgical site to heal properly  Dressings will remain in place and undisturbed until the first postop visit  If you have a drain in place, this will need to be removed in 1-3 days after surgery  The time for the drain to be pulled will be written on your discharge instruction sheet  CAST  INSTRUCTIONS:  You may or may not get a cast following surgery  If you do, pay close attention to the following:     After application of a splint or cast, it is very important to elevate your leg for 24 to 72 hours  The injured area should be elevated well above the heart  Remember Toes above your Nose  Rest and elevation greatly reduce pain and speed the healing process by minimizing early swelling  CALL YOUR DOCTORS OFFICE OR VISIT LOCATION EMERGENCY ROOM IF YOU HAVE ANY OF THE FOLLOWING:     Significant increased pain, which may be caused by swelling, and the feeling that the splint or cast is too tight   Numbness and tingling in your hand or foot, which may be caused by too much pressure on the nerves   Burning and stinging, which may be caused by too much pressure on the skin   Excessive swelling below the cast, which may mean the cast is slowing your blood circulation   Loss of active movement of toes, which request an urgent evaluation   Loss of capillary refill  Pinch the tip of toes and mary ellen the skin  Release pressure and if the skin does not return pink then call the office immediately  DO NOT GET YOUR CAST WET  Bacteria thrive in moist dark areas  We do not want this  If your cast becomes wet, return to the office and we will apply another one  PAIN AFTER SURGERY:  Narcotic pain medication can and will depress your respiratory system if taken in excess  The goal of pain management with narcotics is to be comfortable not pain free    If you take enough narcotics to be pain free then you run the risk of stopping breathing  If this happens, call 911 immediately!  Pain in the heel is often  caused by pressure from the weight of your foot on the bed  Make sure your heel is suspended off the bed by keeping a pillow underneath your calf not your knee  Medications: You will be given narcotic pain medication  Do NOT drive while taking narcotic medications  Medications such as Darvocet, Percocet, Vicoden or Tylenol #3, also contain acetaminophen (Tylenol)  Do not take acetaminophen or Tylenol from home when taking theses medications  When you fill your prescription, you may ask the pharmacist if your pain medication has acetaminophen/Tylenol in it  It is okay to take Tylenol with Oxycontin/Oxycodone  Should you have pain after taking your prescription medication, ibuprophen (Motrin, Advil, and Alleve) is a common over the counter preparation and may often be taken with the prescription pain medication as long as you take them with food  These medications can irritate the stomach lining  Unless you are allergic to aspirin or currently taking a blood thinner, Dr Alesia Peters patients are requested to take one 325 mg aspirin every 12 hours until you are back to walking normally after surgery (This can be up to 6 weeks)  Narcotic medications commonly cause nausea  Taking them with food will decrease this side effect  If you are having extreme nausea, please contact us for an alternative medication or for something that can be taken with this medication to decrease the nausea  Also, narcotic medications frequently cause constipation  An increase of fiber, fruits and vegetables in your diet may alleviate this problem, or if necessary, you may use an over-the-counter medication such as senekot, colace, or Fibercon for constipation problems  You should resume all medications you were taking prior to the surgery unless otherwise specified       Activity:   Because of your recent foot surgery, your activity level will decrease  You will need to elevate your foot ABOVE the level of your heart for a minimum of four days  The length of time necessary for the swelling to go down, and for your wounds to heal properly depends greatly on your efforts here  Elevation is extremely important to avoid compromising the blood supply to your foot  Remember when your foot is down it will swell, which will increase pain and slow healing  Wiggle your toes frequently if possible  If you go home with a regional block, (a type of anesthesia) the foot and leg will be numb  Think of ways to get into your house and around the house until the block wears off  Keep in mind that it may be a legal issue if you drive while in a cast or splint, especially when the splint is on the right foot  You may call the Department of [employer] to schedule a road test if you have adaptive equipment applied to your car  The amount of weight you are allowed to bear on your foot will be written on your discharge sheet filled out at the time of surgery  The following is an explanation of the possibilities:        Heel-only weight bearing:   Usually, this order is given for use with a special shoe only, which will help you to put weight only on your heel  You may bear your body weight on your foot, as long as it is only borne on your heel  Bunions       If the joint that connects your big toe to your foot has a swollen, sore bump, you may have a bunion  More than one-third of women in [facility] have bunions, a common deformity often blamed on wearing tight, narrow shoes and high heels  Bunions may occur in families, but many are from wearing tight shoes, and nine out of 10 bunions happen to women  Too-tight shoes can also cause other disabling foot problems such as corns, calluses and hammer toes  With a bunion, the base of your big toe (metatarsophalangeal joint) gets larger and sticks out   The skin over it may be red and tender, and wearing any type of shoe may be painful  This joint flexes with every step you take, so the bigger your bunion gets, the more it hurts to walk  Bursitis (painful swelling with inflammation) may set in  Your big toe may angle toward your second toe or move all the way under it  In addition, the skin on the bottom of your foot may become thicker and painful  Pressure from your big toe may force your second toe out of alignment, sometimes overlapping your third toe or the big toe  An advanced bunion may make your foot look deformed  If your bunion gets too severe, it may be difficult to walk  Your pain may become chronic and you may develop arthritis  Relief from Bunions  Most bunions are treatable without surgery  Prevention is always best  To minimize your chances of developing a bunion, never force your foot into a tight shoe that crowds your toes or doesn't fit  Choose shoes that conform to the shape of your feet  Go for shoes with wide insteps, broad toes and soft soles  Avoid shoes that are short, tight or sharply pointed, and those with heels higher than 2 1/4 inches  If you already have a bunion, wear shoes that are roomy enough to not put pressure on the big toe  This should relieve most of your pain  You may want to have your shoes stretched out professionally  You may also try protective pads to cushion the painful area  A silicone gel toe spacer (type this into [employer] to purchase these  Pictured below) can be helpful and relieve some discomfort  If your bunion has progressed to the point where you have difficulty walking or experience pain despite accommodative shoes, you may need surgery  Bunion surgery realigns bone, ligaments, tendons and nerves so your big toe can be brought back to its correct position  Orthopaedic surgeons have several techniques to ease your pain   Many bunion surgeries are done on a same-day basis (no hospital stay) using an ankle-block anesthesia  Recovery usually occurs over a three- to six-month period and may include persistent swelling and stiffness  Adolescent [de-identified]   Your young teenager (especially girls age 8 to 13) may develop an "adolescent" bunion at the base of his or her big toe  Unlike adults with bunions, a young person can normally move the affected joint well  Your teenager may have pain and trouble wearing shoes  Try having your child's shoes stretched and/or getting wider shoes  Surgery to remove an adolescent bunion is not recommended unless your child is in extreme pain and the problem does not get better with changes in shoe wear  If your adolescent has bunion surgery, particularly before he or she is fully grown, there is a strong chance the problem will return  Often surgeons will not perform surgery on teenagers until the growth plate matures  Bunionette  If you have a painful swollen lump on the outside of your foot near the base of your little toe, it may be a bunionette ("tailor's bunion")  You may also have a hard corn and painful bursitis in the same spot  Similar to a bunion, bunionettes may be caused by wearing shoes that are too tight  Get shoes that fit comfortably with a soft upper and a roomy toe box  In cases of persistent pain or severe deformity, surgical correction may be necessary

## 2021-05-21 NOTE — PROGRESS NOTES
JOHN Stinson  Attending, Orthopaedic Surgery  Foot and [address] [facility] [address] [facility]      ORTHOPAEDIC FOOT AND ANKLE CLINIC VISIT         Assessment:     Encounter Diagnoses   Name Primary?  Bunion of right foot Yes    Acquired hallux interphalangeus, right            Plan:      Discussed general issues surrounding bunions along with the advantages and disadvantages of various tx strategies  Educational handouts provided  Discussed shoe choice  Discussed non-prescription inserts  Recommended orthotics  Discussed the pros and cons of surgery  She is a candidate for Lapidus yuan bunion correction  She is interested in this surgery      Surgical and conservative management discussed today  * Patient Education: All questions were answered  The patient understands exam findings and treatment plan  Treatment options discussed at length with patient  Jaren Castillo MD  CONSENT FOR BONY PROCEDURES:   Patient understands that there is no guarantee that the surgery will relieve all of Her pain and also understands that there may be a prolonged course of protected weight-bearing status required which will restrict them from driving and other activities as discussed at today's visit  Patient recognizes that there are risks with surgery including bleeding, numbness, nerve irritation, wound complications, infection, continued pain, joint stiffness, malunion, nonunion, anesthetic complications, death, failure of procedure and possible need for further surgery  The patient understands that there is no guarantee that this surgery will relieve all of Her pain and symptoms  Patient understands that there is no guarantee that they will return to full function after the procedure  Patient has provided informed consent for the procedure  Subjective    Chief Complaint:  Right foot pain      Denisse Corrales is a 77 y o  female self referral for evaluation and treatment of right bunions  Symptoms began 1 year ago  Notes progressive deformity of the right great toe  Notes excessive wear on shoes  Symptoms having impact on normal day to day activities  Symptoms have gradually worsened  Treatment to date: no treatments     Pain is rated as 5/10  Pain/symptom location: the right foot  Pain/symptom quality: Positive  Pain/symptom severity: intermittent  Pain/symptom timing:  Worse during the day when active  Pain/symptom conext:  Worse with activites and work  Pain/symptom modifying factors:  Rest makes better, activities make worse  Pain/symptom associated signs/symptoms: none    Outside reports reviewed: none  This is the first time I am seeing this patient with this new problem  Foot and Ankle Surgical History:   See Below    Past Medical, Surgical and Social History:  Past Medical History:  has a past medical history of Arthritis, Borderline diabetes, Colon polyp, Depression, Disease of thyroid gland, DVT (deep venous thrombosis) ([facility] [address] ), Pulmonary emboli ([facility] [address] ), and Rash  Problem List: does not have any pertinent problems on file  Past Surgical History:  has a past surgical history that includes Thyroidectomy, partial; Colonoscopy; and Dilation and curettage, diagnostic / therapeutic  Family History: family history includes Breast cancer (age of onset: 58) in her other; Cancer (age of onset: [age] [facility]) in her sister; No Known Problems in her daughter, maternal aunt, maternal aunt, mother, paternal aunt, paternal aunt, and sister  Social History:  reports that she has never smoked  She has never used smokeless tobacco  She reports current alcohol use  She reports that she does not use drugs  Current Medications: has a current medication list which includes the following prescription(s): vitamin c, cholecalciferol, cyanocobalamin, levothyroxine, loratadine, fish oil, and vitamin e  Allergies: is allergic to crab (diagnostic) - food allergy and penicillins       Review of Systems:  A comprehensive 14 point ROS was performed, reviewed, and the pertinent orthopaedic findings are documented in the HPI  Objective:   /78 (BP Location: Right arm, Patient Position: Sitting)   Pulse 88   Resp 18   Ht 5' 3" (1 6 m)   Wt 69 8 kg (153 lb 12 8 oz)   BMI 27 24 kg/m²   General/Constitutional: No apparent distress: well-nourished and well developed  Eyes: normal ocular motion  Lymphatic: No appreciable lymphadenopathy  Respiratory: Non-labored breathing  Vascular: No edema, swelling or tenderness, except as noted in detailed exam   Integumentary: No impressive skin lesions present, except as noted in detailed exam   Neuro: No ataxia or abnormal movements noted  Psych: Normal mood and affect, oriented to person, place and time  Musculoskeletal: Normal other than stated in HPI and exam               Gait:  Non antalgic  Right Foot  Hammertoe deformity Severity = mild   Hallux deformity:   Severity = moderate   ROM:   within normal limits   Inflammation:   moderate   Tenderness:  moderate   Passive ROM:   100% of normal    Sensation:    normal   Pulses:  normal DP and PT pulses       Imaging: 3 views taken and independently reviewed today which reveals HVA-32, ANDRE-16, and no coronal plane rotation/pronation  Reviewed by me personally         Scribe Attestation    I,:  Timo Bower MA am acting as a scribe while in the presence of the attending physician :       I,:  Elba Conner MD personally performed the services described in this documentation    as scribed in my presence :

## 2021-05-24 ENCOUNTER — TELEPHONE (OUTPATIENT)
Dept: OBGYN CLINIC | Facility: HOSPITAL | Age: 76
End: 2021-05-24

## 2021-05-25 NOTE — TELEPHONE ENCOUNTER
No laser    Yes lapiplasty    I ordered her Xarelto for postoperative DVT ppx  If this was not a medication she was on prior to me prescribing it, she should not be taking this until after the surgery  Immediately after the surgery, she will be required to elevated 23 hours per day getting up once an hour to prevent a blood clot, they will be required to ice the foot 20 minutes/hour  So going to the gym will not be appropriate for  6 weeks after the surgery  The Heel weightbearing shoe is something she should get used to walking in prior to the surgery and she should bring with her the day of the operation

## 2021-05-25 NOTE — TELEPHONE ENCOUNTER
Dr Lachman    Patient is asking if she has sx will it be done with a laser? Lapiplasty? Patient is on Xarelto, no irregular heartbeat, will she have to have an EKG? Patient goes to the gym 3x a week to walk, can she continue going? No heavy lifting  The shoe she was given, can she practice wearing it at home?      # [phone]

## 2021-05-25 NOTE — TELEPHONE ENCOUNTER
Blossom,    This patient was seen and we filled out paperwork  I believe you sent me the original message about her questions  Can we reach out to her and get her on the schedule for September? She is incorrect  There WILL be hardware, titanium plates and screws and a staple  The surgery is minimally invasive but that does not mean poke-holes, these are real incisions  Medical clearance and if they want cardiac clearance then we would get it but only if her PCP wants it

## 2021-05-25 NOTE — TELEPHONE ENCOUNTER
Patient given above information and verbalized understanding  Wants local anesthesia  She read that there will be no hardware, minimally invasive surgery  Does she need medical or cardiac clearance     She wants to schedule surgery in fall, Sep/Oct

## 2021-06-16 ENCOUNTER — OFFICE VISIT (OUTPATIENT)
Dept: AUDIOLOGY | Facility: CLINIC | Age: 76
End: 2021-06-16
Payer: COMMERCIAL

## 2021-06-16 ENCOUNTER — OFFICE VISIT (OUTPATIENT)
Dept: OTOLARYNGOLOGY | Facility: CLINIC | Age: 76
End: 2021-06-16
Payer: COMMERCIAL

## 2021-06-16 VITALS
DIASTOLIC BLOOD PRESSURE: 74 MMHG | SYSTOLIC BLOOD PRESSURE: 110 MMHG | TEMPERATURE: 98.1 F | HEIGHT: 63 IN | OXYGEN SATURATION: 95 % | BODY MASS INDEX: 26.58 KG/M2 | WEIGHT: 150 LBS | HEART RATE: 80 BPM

## 2021-06-16 DIAGNOSIS — E04.1 RIGHT THYROID NODULE: ICD-10-CM

## 2021-06-16 DIAGNOSIS — H90.5 SENSORY HEARING LOSS: Primary | ICD-10-CM

## 2021-06-16 DIAGNOSIS — H90.A32 MIXED CONDUCTIVE AND SENSORINEURAL HEARING LOSS OF LEFT EAR WITH RESTRICTED HEARING OF RIGHT EAR: ICD-10-CM

## 2021-06-16 DIAGNOSIS — H90.3 SENSORINEURAL HEARING LOSS (SNHL) OF BOTH EARS: Primary | ICD-10-CM

## 2021-06-16 DIAGNOSIS — R09.89 GLOBUS SENSATION: ICD-10-CM

## 2021-06-16 DIAGNOSIS — R13.19 OTHER DYSPHAGIA: ICD-10-CM

## 2021-06-16 PROBLEM — R09.A2 GLOBUS SENSATION: Status: ACTIVE | Noted: 2021-06-16

## 2021-06-16 PROCEDURE — 92567 TYMPANOMETRY: CPT | Performed by: AUDIOLOGIST

## 2021-06-16 PROCEDURE — 99204 OFFICE O/P NEW MOD 45 MIN: CPT | Performed by: NURSE PRACTITIONER

## 2021-06-16 PROCEDURE — 92557 COMPREHENSIVE HEARING TEST: CPT | Performed by: AUDIOLOGIST

## 2021-06-16 RX ORDER — MAGNESIUM GLUCONATE 27 MG(500)
500 TABLET ORAL 2 TIMES DAILY
COMMUNITY

## 2021-06-16 NOTE — ASSESSMENT & PLAN NOTE
Ear canals dry, may use coconut oil or hydrocortisone cream to finger and apply to ears daily  Audiogram today indicating bilateral mild to moderate SNHL with mixed conductive hearing loss on left at high frequency sloping to severe  Tymps type A  Word discrimination on right 92% and left 96%  Reviewed causes of asymmetrical conductive hearing loss on the left including inflammatory process, vs otosclerosis  No otitis media, no cerumen impaction  Reviewed treatment options including acceptance, hearing amplification, imaging, and consultation with otologist for surgical options including stapedectomy      After discussion agreed to Ct scan temporal bones  Follow up after testing

## 2021-06-16 NOTE — PATIENT INSTRUCTIONS
Ear canals dry, may use coconut oil or hydrocortisone cream to finger and apply to ears daily      Speech therapy  Consider reflux medication therapy  Ct scan of ears

## 2021-06-16 NOTE — PROGRESS NOTES
Assessment/Plan:    Mixed conductive and sensorineural hearing loss of left ear with restricted hearing of right ear  Ear canals dry, may use coconut oil or hydrocortisone cream to finger and apply to ears daily  Audiogram today indicating bilateral mild to moderate SNHL with mixed conductive hearing loss on left at high frequency sloping to severe  Tymps type A  Word discrimination on right 92% and left 96%  Reviewed causes of asymmetrical conductive hearing loss on the left including inflammatory process, vs otosclerosis  No otitis media, no cerumen impaction  Reviewed treatment options including acceptance, hearing amplification, imaging, and consultation with otologist for surgical options including stapedectomy  After discussion agreed to Ct scan temporal bones  Follow up after testing          Other dysphagia  Discussed swallowing changes gradually worsening  Partial thyroidectomy one year ago and pt notes swallowing worse since then  Voice is soft but not breathy or raspy  Esophogram completed 12/2020  Discussed possible causes of changes in swallow including recent surgery vs reflux  Laryngoscopy by Dr Terri Bose at [facility] with no indication of abnormal findings in his notes  Options include reflux medications, repeat video swallow study, speech therapy evaluation, thoughtful swallowing, use fluids with swallow  After discussion agreed to speech therapy for swallow  Follow up in 2 to 3 months to monitor         Diagnoses and all orders for this visit:    Sensorineural hearing loss (SNHL) of both ears  -     Ambulatory referral to Audiology; Future    Right thyroid nodule  -     Ambulatory Referral to Otolaryngology    Globus sensation  -     Ambulatory Referral to Otolaryngology  -     Ambulatory referral to Speech Therapy;  Future    Mixed conductive and sensorineural hearing loss of left ear with restricted hearing of right ear  -     CT orbits/temporal bones/skull base wo contrast; Future    Other dysphagia  -     Ambulatory referral to Speech Therapy; Future    Other orders  -     magnesium gluconate (MAGONATE) 500 MG tablet; Take 500 mg by mouth 2 (two) times a day          Subjective:      Patient ID: Delmy Yepez is a 77 y o  female  Presents today as a new patient due to multiple ENT concerns  Feels something inside ear, left  Comes and goes  No otorrhea, no otalgia  Talks loudly  Hearing decreased  Occasional pain in temple region  No current hearing aids  No ear surgery  Hearing loss as young child after head injury but did return  Partial thyroidectomy one year ago  Following Dr Madalyn Stokes for endocrinology care  Difficulty swallowing at times  More so with pills  Prior ENT care with [facility]  Esophogram in 2019  The following portions of the patient's history were reviewed and updated as appropriate: allergies, current medications, past family history, past medical history, past social history, past surgical history and problem list     Review of Systems   Constitutional: Negative  HENT: Positive for hearing loss, trouble swallowing and voice change  Negative for congestion, ear discharge, ear pain, nosebleeds, postnasal drip, rhinorrhea, sinus pressure, sinus pain, sore throat and tinnitus  Eyes: Negative  Respiratory: Negative for chest tightness and shortness of breath  Cardiovascular: Negative  Gastrointestinal: Negative  Endocrine: Negative  Musculoskeletal: Negative  Skin: Negative for color change  Neurological: Negative for dizziness, numbness and headaches  Psychiatric/Behavioral: Negative  Objective:      /74 (BP Location: Left arm, Patient Position: Sitting, Cuff Size: Adult)   Pulse 80   Temp 98 1 °F (36 7 °C) (Temporal)   Ht 5' 3" (1 6 m)   Wt 68 kg (150 lb)   SpO2 95%   BMI 26 57 kg/m²          Physical Exam  Constitutional:       Appearance: She is well-developed  HENT:      Head: Normocephalic        Right Ear: Hearing, tympanic membrane, ear canal and external ear normal  No decreased hearing noted  No drainage or tenderness  Tympanic membrane is not perforated or erythematous  Left Ear: Hearing, tympanic membrane, ear canal and external ear normal  No decreased hearing noted  No drainage or tenderness  Tympanic membrane is not perforated or erythematous  Nose: Nose normal  No nasal deformity or septal deviation  Mouth/Throat:      Mouth: Mucous membranes are not pale and not dry  No oral lesions  Dentition: Normal dentition  Pharynx: Uvula midline  No oropharyngeal exudate  Neck:      Trachea: No tracheal deviation  Cardiovascular:      Rate and Rhythm: Normal rate  Pulmonary:      Effort: Pulmonary effort is normal  No accessory muscle usage or respiratory distress  Musculoskeletal:      Right shoulder: Normal range of motion  Cervical back: Full passive range of motion without pain, normal range of motion and neck supple  Lymphadenopathy:      Cervical: No cervical adenopathy  Skin:     General: Skin is warm and dry  Neurological:      Mental Status: She is alert and oriented to person, place, and time  Cranial Nerves: No cranial nerve deficit  Sensory: No sensory deficit  Psychiatric:         Behavior: Behavior is cooperative

## 2021-06-16 NOTE — PROGRESS NOTES
ENT HEARING EVALUATION    Name:  Saul Casas  :  1945  Age:  77 y o  Date of Evaluation: 21     History: ENT Audiogram  Reason for visit: Saul Casas is being seen today at the request of Cj Cortes for an evaluation of hearing as part of their initial ENT visit  EVALUATION:    Otoscopic Evaluation:   Right Ear: Clear and healthy ear canal and tympanic membrane   Left Ear: Clear and healthy ear canal and tympanic membrane    Tympanometry:   Right: Type A - normal middle ear pressure and compliance   Left: Type A - normal middle ear pressure and compliance    Audiogram Results:  Pure tone testing revealed a normal sloping to moderate sensorineural hearing loss in the  right ear and a mild sloping to severe mixed hearing loss in the  left  ear  SRT and PTA are in agreement indicating good test reliability  Word recognition scores were excellent bilaterally         *see attached audiogram      RECOMMENDATIONS:  Consult ENT      Dulce Maria Rose , CCC-A  Clinical Audiologist

## 2021-06-18 NOTE — ASSESSMENT & PLAN NOTE
Discussed swallowing changes gradually worsening  Partial thyroidectomy one year ago and pt notes swallowing worse since then  Voice is soft but not breathy or raspy  Esophogram completed 12/2020  Discussed possible causes of changes in swallow including recent surgery vs reflux  Laryngoscopy by Dr Tad Kumar at [facility] with no indication of abnormal findings in his notes  Options include reflux medications, repeat video swallow study, speech therapy evaluation, thoughtful swallowing, use fluids with swallow     After discussion agreed to speech therapy for swallow  Follow up in 2 to 3 months to monitor

## 2021-06-22 ENCOUNTER — TELEPHONE (OUTPATIENT)
Dept: OBGYN CLINIC | Facility: HOSPITAL | Age: 76
End: 2021-06-22

## 2021-06-22 NOTE — TELEPHONE ENCOUNTER
This patient is not on the surgical schedule  She was scheduled to come and see me today to discuss treatment options further but did not show for the appointment  We are happy to see her back at any time

## 2021-06-22 NOTE — TELEPHONE ENCOUNTER
Dr Sandra Chaparro    [phone]    Patient is calling back to let you know she does not want to have surgery

## 2021-06-23 NOTE — PROGRESS NOTES
Speech-Language Pathology Initial Evaluation    Today's date: 2021  Patients name: Dexter Hammans  :   MRN: [id number]  Safety measures: N/A  Referring provider: SILVER Reeder    Primary diagnosis/billing code: R 13 12  Secondary diagnosis/billing code: R09 89    Visit tracking:  -Referring provider: Epic  -Billing guidelines: CMS  -Visit #1  -Insurance: Primary: [place], Secondary: [address] [place] [address]   -RE due: 2021    Subjective comments: Pt arrived to evaluation and reports she is well on this DOS  "When I take a pill , with milk I swallow better than with water "    How did the patient hear about us? Physician    Reason for referral: Difficulty swallowing solids or liquids  Prior functional status: Communication effective and appropriate in all situations  Clinically complex situations: N/A    History: Patient is a 77 y o  female who was referred to outpatient skilled Speech Therapy services for a dysphagia evaluation  DOS is noted to be after partial thyroidectomy was completed  Medical history is significant for tonsillectomy, pulmonary emboli, partial thyroidectomy, and tubal ligation  Pt denies recent pneumonia and/or weight loss  Pt reports when swallowing pills "something feels stuck, yeah right there" while pointing to lower pharyngeal region  Pt states :when I take certain pills and the pill is too big, I have problems getting it down " Pt reports she typically cuts larger pills in half and will follow it with a liquid wash  Pt states she avoids dry foods and will often soften foods with butter or olive oil  Pt reports she will chew nuts for longer periods of time to soften the bolus and cuts meats into small pieces  Pt reports she eats spinach a lot because its easier to swallow  Pt was a reliable informant for the following report       Patient underwent VFSS on 12/10/2020, Below are the findings:    "Oral Stage:WFL   Adequate bolus retrieval; Mastication, manipulation, bolus formation, and transfer appeared [facility]  Some rocking motion w/ bolus transfer noted  Good oral control w/ liquids    Pharyngeal Stage: WFL   Swallow initiation was timely with complete laryngeal excursion and epiglottic inversion  Mild vallecular retention noted, increased w/ hard solids  Also pill stasis noted in the vallecula, cleared w/ head extension and liquid wash  No penetration or aspiration observed     Esophageal Stage:  briefly assessed  All materials cleared the esophagus and emptied into the stomach w/o incident     Assessment Summary:  Oral and pharyngeal stages of swallowing appeared [facility]  Mild vallecular retention noted but no penetration or aspiration observed  Diagnosis/Prognosis:   Recommendations:  Regular diet w/ thin liquids  meds as tolerated- cut larger pills, pt stated she already does cut her large calcium pill  "      Hearing: has SNL hearing loss, hearing test completed, going to get a CT scan done  Pt reports she has pain/discomfort behind left ear and neck  Vision: uses glasses for reading and far distances     Home environment/lifestyle: Resides at home alone   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  Vocational status:     Mental status: Alert  Behavior status: Cooperative  Communication modalities: Verbal  Rehabilitation prognosis: Good rehab potential to reach the established goals    Assessments    DYSPHAGIA EVALUATION:    -Reason for referral: Signs/symptoms of dysphagia    -Subjective report of swallowing difficulty: Globus sensation     -Eating Assessment Tool (EAT-10) Swallowing Screening Tool is a patient self-assessment tool that rates the percieved impairment a swallowing disorder has on the Functional, Physical, and Emotional aspects of one's life  EAT-10 score: 10/40    -Difficulty swallowing: Solids and Pills    -Current diet (solids): Regular  -Current diet (liquids):  Thin  -Alternative Feeding Method?: No    -Facial appearance Symmetrical -Dentition Adequate, Limited dentition and 2 teeth missing from lower molar table    -Labial function WFL   -Lingual function WFL   -Velar function Symmetrical       LIQUID CONSISTENCY TESTIN  Liquid Consistency (Thin) - water   Administered by: Cup, self-fed   Strategies, attempts, and responses: None    CLINICAL FINDINGS:   Oral phase impairments: WFL   Pharyngeal Phase Impairments: WFL    *Laryngeal excursion upon palpation: Appeared WFL      SOLID CONSISTENCY TESTIN  Solid Consistency (Regular, Mechanical Soft and Puree) - granola bar/pretzels/peanut butter crackers, soft fruit bar, diced peaches in thin liquid juice, and pudding    Administered by: Cup, Spoon and Self-fed   Strategies, attempts, and responses: Multiple swallows 2/3 swallows, alternate between liquid and bite, tongue mashing w/secretions and Other: bite followed by liquid wash    CLINICAL FINDINGS:   Oral phase impairments: Piecemeal deglutition and WFL   Pharyngeal Phase Impairments: Complaints of globus sensation    *Laryngeal excursion upon palpation: Appeared WFL    SWALLOWING DIAGNOSTIC IMPRESSION:  -Swallowing diagnosis/severity: mild pharyngeal phase dysphagia    -Factors affecting performance: None    -Safety concerns: No limitations    -Risk factors: None    SAFETY PRECAUTIONS:  -Supervision: Independent     -Strategies: Small sips and bites when eating, Slow rate, swallow between bites and Alternate liquids and solids    -Positioning: Chin tuck    -Compensatory strategies: Supraglottic swallow, Mendelsohn maneuver, Effortful swallow, Multiple swallows and Other:neck extension     -Recommend (solids): Regular    -Recommend (liquids): Thin    -Recommend (medications): cut large pills, alternate between bites and sips, take pills w/applesauce     REFERRALS: None    Goals    Short-term goals:  1   Patient will complete swallowing maneuver (e g , supraglottic swallow, Mendelsohn maneuver, effortful swallow, etc ) with 80% accuracy to facilitate improved oral motor strengthening, tongue base retraction, HLE, airway protection and/or clearance of the bolus through the pharynx, to be achieved in 4-6 weeks  2  Patient will perform compensatory strategies (e g , upright positioning, small bites/sips, slow rate, alternation of consistencies, multiple swallows, effortful swallow, chin tuck, head turn, etc ) with 80% accuracy to eliminate globus sensation to be achieved in 4-6 weeks  3  Patient will demonstrate independent use of recommended safe swallowing strategies during a clinician assessed meal across three sessions, to be achieved in 4-6 weeks  Long-term goals:  1  Patient will develop safe and functional chewing and swallowing via use of dysphagia management strategies and diet modifications for adequate meal completion without significant s/sx of dysphagia and aspiration in order to maximize quality of life and to decrease potential for medical complications for dysphagia  Impressions/Recommendations    Impressions:   -Patient presents with mild oropharyngeal dysphagia c/b prolonged mastication and piecemeal deglutition secondary to globus sensation upon acceptance of hard solids  Per VFSS on Dec 2020:  "Oral Stage:WFL:Adequate bolus retrieval; Mastication, manipulation, bolus formation, and transfer appeared [facility]  Some rocking motion w/ bolus transfer noted  Good oral control w/ liquids  Pharyngeal Stage: WFL: Swallow initiation was timely with complete laryngeal excursion and epiglottic inversion  Mild vallecular retention noted, increased w/ hard solids  Also pill stasis noted in the vallecula, cleared w/ head extension and liquid wash  No penetration or aspiration observed  Esophageal Stage: briefly assessed  All materials cleared the esophagus and emptied into the stomach w/o incident  Assessment Summary:Oral and pharyngeal stages of swallowing appeared [facility]  Mild vallecular retention noted but no penetration or aspiration observed "    Pt would benefit from education of compensatory strategies and exercises to facilitate clearance of bolus through pharynx and reduce vallecular retention        Recommendations:  -Patient would benefit from outpatient skilled Speech Therapy services: Dysphagia therapy    -Frequency: 1x weekly  -Duration: 1 month    -Intervention certification from: 0/62/3371  -Intervention certification to: 1/65/5291

## 2021-06-24 ENCOUNTER — EVALUATION (OUTPATIENT)
Dept: SPEECH THERAPY | Facility: CLINIC | Age: 76
End: 2021-06-24
Payer: COMMERCIAL

## 2021-06-24 DIAGNOSIS — R09.89 GLOBUS SENSATION: ICD-10-CM

## 2021-06-24 DIAGNOSIS — R13.12 DYSPHAGIA, OROPHARYNGEAL PHASE: Primary | ICD-10-CM

## 2021-06-24 PROCEDURE — 92610 EVALUATE SWALLOWING FUNCTION: CPT

## 2021-06-30 ENCOUNTER — OFFICE VISIT (OUTPATIENT)
Dept: SPEECH THERAPY | Facility: CLINIC | Age: 76
End: 2021-06-30
Payer: COMMERCIAL

## 2021-06-30 DIAGNOSIS — R13.12 DYSPHAGIA, OROPHARYNGEAL PHASE: Primary | ICD-10-CM

## 2021-06-30 DIAGNOSIS — R09.89 GLOBUS SENSATION: ICD-10-CM

## 2021-06-30 PROCEDURE — 92526 ORAL FUNCTION THERAPY: CPT

## 2021-06-30 NOTE — PROGRESS NOTES
Speech Treatment Note    Today's date: 2021  Patient name: Faizan Mtz  :   MRN: [id number]  Referring provider: SILVER Reaves  Dx:   Encounter Diagnosis     ICD-10-CM    1  Dysphagia, oropharyngeal phase  R13 12    2  Globus sensation  R09 89        Start Time: 1400  Stop Time: 1445  Total time in clinic (min): 45 minutes    Visit Number:    -Referring provider: Elva Esqueda  -Billing guidelines: CMS  -Visit #2  -Insurance: Primary: Marta Junior, Secondary: [address] [place] [address]   - due: 2021    Subjective: Pt reports she is well on this DOS  She reports chin tuck strategy has been beneficial  She reports she is accumulating secretions following her nightly teeth brushing  Pt advised to follow her oral rinse with 3 swallows of water and maintain an upright position for minimum of 30 minutes to decrease reflux symptoms  Short-term goals:    1  Patient will complete swallowing maneuver (e g , supraglottic swallow, Mendelsohn maneuver, effortful swallow, etc ) with 80% accuracy to facilitate improved oral motor strengthening, tongue base retraction, HLE, airway protection and/or clearance of the bolus through the pharynx, to be achieved in 4-6 weeks  --Pt was provided demonstration of mendelsohn maneuver, instructing her to sustain thyroid notch in elevated position to increase PES opening for up to 2 seconds  Pt completed mendelsohn maneuver with a dry swallow x 5 given initial demonstration and tactile cues  Pt was noted to clear salivary bolus without retention  She completed this strategy with a mechanical soft (nutrigrain bar) x 2 given verbal cues   Pt completed mendelsohn x 2 with puree (pudding) given maximal cues to sustain elevated thyroid notch      2  Patient will perform compensatory strategies (e g , upright positioning, small bites/sips, slow rate, alternation of consistencies, multiple swallows, effortful swallow, chin tuck, head turn, etc ) with 80% accuracy to eliminate globus sensation to be achieved in 4-6 weeks  --Pt & clinician reviewed chin tuck strategy  Pt completed chin tuck while eating regular solid (yasmani cracker) with prolonged mastication to form a cohesive bolus x 2 while taking small bites given moderate verbal cues to continue mastication  Pt independently accepted puree in upright position w/o signs or symptoms of aspiration       3  Patient will demonstrate independent use of recommended safe swallowing strategies during a clinician assessed meal across three sessions, to be achieved in 4-6 weeks  Other:Discussed session and patient progress with caregiver/family member after today's session    Recommendations:Continue with Plan of Care

## 2021-07-08 NOTE — PROGRESS NOTES
Pt requesting to be d/c as her family in [place] is sick and she will need to take care of them  She was advised to return if she has any new concerns related to swallowing

## 2021-07-22 ENCOUNTER — APPOINTMENT (OUTPATIENT)
Dept: LAB | Facility: HOSPITAL | Age: 76
End: 2021-07-22
Attending: INTERNAL MEDICINE
Payer: COMMERCIAL

## 2021-07-22 DIAGNOSIS — E89.0 POSTSURGICAL HYPOTHYROIDISM: ICD-10-CM

## 2021-07-22 DIAGNOSIS — E78.49 FAMILIAL COMBINED HYPERLIPIDEMIA: ICD-10-CM

## 2021-07-22 LAB
ALBUMIN SERPL BCP-MCNC: 4.2 G/DL (ref 3.4–4.8)
ALP SERPL-CCNC: 64.2 U/L (ref 35–140)
ALT SERPL W P-5'-P-CCNC: 14 U/L (ref 5–54)
ANION GAP SERPL CALCULATED.3IONS-SCNC: 6 MMOL/L (ref 4–13)
AST SERPL W P-5'-P-CCNC: 17 U/L (ref 15–41)
BASOPHILS # BLD AUTO: 0.21 THOUSANDS/ΜL (ref 0–0.1)
BASOPHILS NFR BLD AUTO: 2 % (ref 0–1)
BILIRUB SERPL-MCNC: 0.53 MG/DL (ref 0.3–1.2)
BUN SERPL-MCNC: 22 MG/DL (ref 6–20)
CALCIUM SERPL-MCNC: 9.6 MG/DL (ref 8.4–10.2)
CHLORIDE SERPL-SCNC: 105 MMOL/L (ref 96–108)
CO2 SERPL-SCNC: 28 MMOL/L (ref 22–33)
CREAT SERPL-MCNC: 0.88 MG/DL (ref 0.4–1.1)
EOSINOPHIL # BLD AUTO: 0.61 THOUSAND/ΜL (ref 0–0.61)
EOSINOPHIL NFR BLD AUTO: 6 % (ref 0–6)
ERYTHROCYTE [DISTWIDTH] IN BLOOD BY AUTOMATED COUNT: 13.9 % (ref 11.6–15.1)
GFR SERPL CREATININE-BSD FRML MDRD: 65 ML/MIN/1.73SQ M
GLUCOSE P FAST SERPL-MCNC: 112 MG/DL (ref 70–105)
HCT VFR BLD AUTO: 46.9 % (ref 34.8–46.1)
HGB BLD-MCNC: 15.4 G/DL (ref 11.5–15.4)
IMM GRANULOCYTES # BLD AUTO: 0.02 THOUSAND/UL (ref 0–0.2)
IMM GRANULOCYTES NFR BLD AUTO: 0 % (ref 0–2)
LYMPHOCYTES # BLD AUTO: 2.07 THOUSANDS/ΜL (ref 0.6–4.47)
LYMPHOCYTES NFR BLD AUTO: 19 % (ref 14–44)
MCH RBC QN AUTO: 30 PG (ref 26.8–34.3)
MCHC RBC AUTO-ENTMCNC: 32.8 G/DL (ref 31.4–37.4)
MCV RBC AUTO: 91 FL (ref 82–98)
MONOCYTES # BLD AUTO: 1.17 THOUSAND/ΜL (ref 0.17–1.22)
MONOCYTES NFR BLD AUTO: 11 % (ref 4–12)
NEUTROPHILS # BLD AUTO: 6.83 THOUSANDS/ΜL (ref 1.85–7.62)
NEUTS SEG NFR BLD AUTO: 62 % (ref 43–75)
PLATELET # BLD AUTO: 296 THOUSANDS/UL (ref 149–390)
PMV BLD AUTO: 9.4 FL (ref 8.9–12.7)
POTASSIUM SERPL-SCNC: 4.6 MMOL/L (ref 3.5–5)
PROT SERPL-MCNC: 7.6 G/DL (ref 6.4–8.3)
RBC # BLD AUTO: 5.13 MILLION/UL (ref 3.81–5.12)
SODIUM SERPL-SCNC: 139 MMOL/L (ref 133–145)
TSH SERPL DL<=0.05 MIU/L-ACNC: 0.09 UIU/ML (ref 0.34–5.6)
WBC # BLD AUTO: 10.91 THOUSAND/UL (ref 4.31–10.16)

## 2021-07-22 PROCEDURE — 85025 COMPLETE CBC W/AUTO DIFF WBC: CPT

## 2021-07-22 PROCEDURE — 80053 COMPREHEN METABOLIC PANEL: CPT

## 2021-07-22 PROCEDURE — 36415 COLL VENOUS BLD VENIPUNCTURE: CPT

## 2021-07-22 PROCEDURE — 84443 ASSAY THYROID STIM HORMONE: CPT

## 2021-08-17 ENCOUNTER — EVALUATION (OUTPATIENT)
Dept: OCCUPATIONAL THERAPY | Facility: CLINIC | Age: 76
End: 2021-08-17
Payer: COMMERCIAL

## 2021-08-17 DIAGNOSIS — M19.049 LOCALIZED, PRIMARY OSTEOARTHRITIS OF HAND, UNSPECIFIED LATERALITY: Primary | ICD-10-CM

## 2021-08-17 PROCEDURE — 97018 PARAFFIN BATH THERAPY: CPT | Performed by: OCCUPATIONAL THERAPIST

## 2021-08-17 PROCEDURE — 97165 OT EVAL LOW COMPLEX 30 MIN: CPT | Performed by: OCCUPATIONAL THERAPIST

## 2021-08-17 NOTE — PROGRESS NOTES
OT Evaluation     Today's date: 2021  Patient name: Curt Galvan  :   MRN: [id number]  Referring provider: No ref  provider found  Dx:   Encounter Diagnosis     ICD-10-CM    1  Localized, primary osteoarthritis of hand, unspecified laterality  M19 049                   Assessment  Assessment details: Norberto Palomo is referred to therapy due to B/L hand stiffness and pain related to diffuse arthritis  Reduced motion is present primarily in the right long finger and affected by intrinsic tightness  She is able to flex digits to the palm in all other digits and can achieve full extension  She demonstrates tenderness at this time in the right 1st [facility] joint only   and pinch strength is mildly reduced bilaterally  She was educated today on a home parafin unit for pain management and also provided with silipos sleeves for the Heberden's nodules for pain relief  She will benefit from gentle ROM and strengthening as well  See below for a detailed assessment  Impairments: abnormal or restricted ROM, activity intolerance, impaired physical strength, lacks appropriate home exercise program and pain with function    Goals  STG: Patient will be compliant with home exercise program in 1 week  STG: Pain will not exceed a 2/10 during appropriate activity and during therapy in 4 weeks  STG: Patient will be independent with pain reduction techniques at home in 2 weeks  STG: Strength will be improved to >40 pounds in 4 weeks  LTG: FOTO score increase by 10 points within 6 weeks or discharge  Plan  Plan details: Treatment to include modalities, manual therapy, PRE's, HEP, and orthotics as appropriate     Patient would benefit from: skilled OT and OT eval  Planned modality interventions: thermotherapy: hydrocollator packs and thermotherapy: paraffin bath  Planned therapy interventions: home exercise program, joint mobilization, manual therapy, therapeutic exercise, patient education, therapeutic activities, stretching and strengthening  Frequency: 1x week  Duration in visits: 10  Plan of Care beginning date: 2021  Plan of Care expiration date: 10/18/2021  Treatment plan discussed with: patient        Subjective Evaluation    History of Present Illness  Mechanism of injury: Matteo Yeh reports that she has had symptoms of hand stiffness and pain for a year or more  More recently she has developed more acute pain in the left small finger  She denies acute trauma or injury to the hands  She does not use splints or orthotics at this time  X rays revealed: "severe degenerative changes to the left first [facility] joint, bilateral middle finger DIP joints  There are mild to moderate degenerative changes to the right thumb IP joint, right thumb first CMC joint, and the rest of the DIP joints of the fingers  There are minimal to mild degenerative changes to the PIP joints of the fingers  There is diffuse demineralization throughout the fingers and the hand "    Pain  At worst pain ratin  Pain location: left small finger acute pain, otherwise generalized discomfort  Quality: discomfort    Hand dominance: right      Diagnostic Tests  X-ray: abnormal  Treatments  No previous or current treatments  Patient Goals  Patient goals for therapy: decreased pain  Patient goal: Reducing the bumps on her fingers        Objective     Observations     Left Wrist/Hand   Positive for Heberden's nodes and Wartenberg's sign  Right Wrist/Hand   Positive for Heberden's nodes  Tenderness     Left Wrist/Hand   No tenderness in the carpometacarpal joint  Right Wrist/Hand   Tenderness in the carpometacarpal joint       Active Range of Motion     Left Wrist   Wrist flexion: WFL  Wrist extension: WFL    Right Wrist   Wrist flexion: WFL  Wrist extension: WFl    Passive Range of Motion     Right Digits   Flexion   Middle     MCP: 88    PIP: 95    DIP: 55    Additional Passive Range of Motion Details  Full flexion and extension of all other digits  Strength/Myotome Testing     Left Wrist/Hand      (2nd hand position)     Trial 1: 37 7    Thumb Strength  Key/Lateral Pinch     Trial 1: 6 9  Palmar/Three-Point Pinch     Trial 1: 8 5    Right Wrist/Hand      (2nd hand position)     Trial 1: 38 4    Thumb Strength   Key/Lateral Pinch     Trial 1: 9 4  Palmar/Three-Point Pinch     Trial 1: 7 5    Tests     Left Wrist/Hand   Positive CMC grind (positive for crepitus, not for pain) and intrinsic muscle tightness (long finger)  Negative Tinel's sign (medial nerve)  Right Wrist/Hand   Positive CMC grind (positive for crepitus, negative for pain) and intrinsic muscle tightness (long finger)  Negative Tinel's sign (medial nerve)       Additional Tests Details  Crepitus at right small finger DIP joint with flexion, and left PIP joint  - CMC lever test  -carpal tunnel compression              Precautions: Universal    Manuals                                                                 Neuro Re-Ed                                                                                                        Ther Ex B/L             HEP: tendon gliding, intrinsic stretch Issued            Intrinsic stretching as tolerated B/L             Pegs in with hook fist             Keypegs             Rubber bands extension              Coordination balls                                        Ther Activity                                                                              Modalities             Parafin B/L hands, 10'

## 2021-08-17 NOTE — LETTER
2021    [phone]    Patient: Nomi Covington   YOB: 1945   Date of Visit: 2021     Encounter Diagnosis     ICD-10-CM    1  Localized, primary osteoarthritis of hand, unspecified laterality  M19 049        Dear Dr Gilliland Serve: Thank you for your recent referral of Nomi Covington  Please review the attached evaluation summary from Geneva's recent visit  Please verify that you agree with the plan of care by signing the attached order  If you have any questions or concerns, please do not hesitate to call  I sincerely appreciate the opportunity to share in the care of one of your patients and hope to have another opportunity to work with you in the near future  Sincerely,    Naren Lawrence, OT      Referring Provider:     I certify that I have read the below Plan of Care and certify the need for these services furnished under this plan of treatment while under my care  MD [phone]  Via Fax: [phone]        OT Evaluation     Today's date: 2021  Patient name: Nomi Covington  :   MRN: [id number]  Referring provider: No ref  provider found  Dx:   Encounter Diagnosis     ICD-10-CM    1  Localized, primary osteoarthritis of hand, unspecified laterality  M19 049                   Assessment  Assessment details: Irina Winkler is referred to therapy due to B/L hand stiffness and pain related to diffuse arthritis  Reduced motion is present primarily in the right long finger and affected by intrinsic tightness  She is able to flex digits to the palm in all other digits and can achieve full extension  She demonstrates tenderness at this time in the right 1st [facility] joint only   and pinch strength is mildly reduced bilaterally   She was educated today on a home parafin unit for pain management and also provided with silipos sleeves for the Heberden's nodules for pain relief  She will benefit from gentle ROM and strengthening as well  See below for a detailed assessment  Impairments: abnormal or restricted ROM, activity intolerance, impaired physical strength, lacks appropriate home exercise program and pain with function    Goals  STG: Patient will be compliant with home exercise program in 1 week  STG: Pain will not exceed a 2/10 during appropriate activity and during therapy in 4 weeks  STG: Patient will be independent with pain reduction techniques at home in 2 weeks  STG: Strength will be improved to >40 pounds in 4 weeks  LTG: FOTO score increase by 10 points within 6 weeks or discharge  Plan  Plan details: Treatment to include modalities, manual therapy, PRE's, HEP, and orthotics as appropriate  Patient would benefit from: skilled OT and OT eval  Planned modality interventions: thermotherapy: hydrocollator packs and thermotherapy: paraffin bath  Planned therapy interventions: home exercise program, joint mobilization, manual therapy, therapeutic exercise, patient education, therapeutic activities, stretching and strengthening  Frequency: 1x week  Duration in visits: [address] beginning date: 8/17/2021  Plan of Care expiration date: 10/18/2021  Treatment plan discussed with: patient        Subjective Evaluation    History of Present Illness  Mechanism of injury: Artem Given reports that she has had symptoms of hand stiffness and pain for a year or more  More recently she has developed more acute pain in the left small finger  She denies acute trauma or injury to the hands  She does not use splints or orthotics at this time  X rays revealed: "severe degenerative changes to the left first [facility] joint, bilateral middle finger DIP joints  There are mild to moderate degenerative changes to the right thumb IP joint, right thumb first CMC joint, and the rest of the DIP joints of the fingers   There are minimal to mild degenerative changes to the PIP joints of the fingers  There is diffuse demineralization throughout the fingers and the hand "    Pain  At worst pain ratin  Pain location: left small finger acute pain, otherwise generalized discomfort  Quality: discomfort    Hand dominance: right      Diagnostic Tests  X-ray: abnormal  Treatments  No previous or current treatments  Patient Goals  Patient goals for therapy: decreased pain  Patient goal: Reducing the bumps on her fingers        Objective     Observations     Left Wrist/Hand   Positive for Heberden's nodes and Wartenberg's sign  Right Wrist/Hand   Positive for Heberden's nodes  Tenderness     Left Wrist/Hand   No tenderness in the carpometacarpal joint  Right Wrist/Hand   Tenderness in the carpometacarpal joint  Active Range of Motion     Left Wrist   Wrist flexion: WFL  Wrist extension: WFL    Right Wrist   Wrist flexion: WFL  Wrist extension: WFl    Passive Range of Motion     Right Digits   Flexion   Middle     MCP: 88    PIP: 95    DIP: 55    Additional Passive Range of Motion Details  Full flexion and extension of all other digits  Strength/Myotome Testing     Left Wrist/Hand      (2nd hand position)     Trial 1: 37 7    Thumb Strength  Key/Lateral Pinch     Trial 1: 6 9  Palmar/Three-Point Pinch     Trial 1: 8 5    Right Wrist/Hand      (2nd hand position)     Trial 1: 38 4    Thumb Strength   Key/Lateral Pinch     Trial 1: 9 4  Palmar/Three-Point Pinch     Trial 1: 7 5    Tests     Left Wrist/Hand   Positive CMC grind (positive for crepitus, not for pain) and intrinsic muscle tightness (long finger)  Negative Tinel's sign (medial nerve)  Right Wrist/Hand   Positive CMC grind (positive for crepitus, negative for pain) and intrinsic muscle tightness (long finger)  Negative Tinel's sign (medial nerve)       Additional Tests Details  Crepitus at right small finger DIP joint with flexion, and left PIP joint  - CMC lever test  -carpal tunnel compression              Precautions: Universal    Manuals                                                                 Neuro Re-Ed                                                                                                        Ther Ex B/L             HEP: tendon gliding, intrinsic stretch Issued            Intrinsic stretching as tolerated B/L             Pegs in with hook fist             Keypegs             Rubber bands extension              Coordination balls                                        Ther Activity                                                                              Modalities             Parafin B/L hands, 10'

## 2021-08-20 ENCOUNTER — HOSPITAL ENCOUNTER (OUTPATIENT)
Dept: SLEEP CENTER | Facility: CLINIC | Age: 76
Discharge: HOME | End: 2021-08-20
Payer: COMMERCIAL

## 2021-08-20 DIAGNOSIS — G47.33 OSA (OBSTRUCTIVE SLEEP APNEA): ICD-10-CM

## 2021-08-20 PROCEDURE — G0399 HOME SLEEP TEST/TYPE 3 PORTA: HCPCS

## 2021-08-20 NOTE — PROGRESS NOTES
Home Sleep Study Documentation    Pre-Sleep Home Study:    Set-up and instructions performed by: DIVINE Mckeon     Technician performed demonstration for Patient: yes    Return demonstration performed by Patient: yes    Written instructions provided to Patient: yes    Patient signed consent form: yes        Post-Sleep Home Study:    Additional comments by Patient: None     Home Sleep Study Failed:no:    Failure reason: N/A    Reported or Detected: N/A    Scored by: DIVINE Etienne The pt's back was clear.

## 2021-08-25 ENCOUNTER — TELEPHONE (OUTPATIENT)
Dept: SLEEP CENTER | Facility: CLINIC | Age: 76
End: 2021-08-25

## 2021-08-25 DIAGNOSIS — G47.33 OSA (OBSTRUCTIVE SLEEP APNEA): Primary | ICD-10-CM

## 2021-08-25 DIAGNOSIS — G47.34 NOCTURNAL HYPOXEMIA: ICD-10-CM

## 2021-08-25 PROCEDURE — 95806 SLEEP STUDY UNATT&RESP EFFT: CPT | Performed by: INTERNAL MEDICINE

## 2021-08-25 NOTE — TELEPHONE ENCOUNTER
Left message for this [facility]  patient to call back for sleep study results         Sleep study shows moderate DORIS  CPAP titration ordered, patient needs to schedule the study

## 2021-08-26 ENCOUNTER — TELEPHONE (OUTPATIENT)
Dept: PULMONOLOGY | Facility: CLINIC | Age: 76
End: 2021-08-26

## 2021-08-26 DIAGNOSIS — G47.34 NOCTURNAL HYPOXIA: Primary | ICD-10-CM

## 2021-08-26 NOTE — TELEPHONE ENCOUNTER
Pt LM that she received a call about her sleep study results and would like a callback regarding them   [phone]

## 2021-08-26 NOTE — TELEPHONE ENCOUNTER
Lvm for pt to cb to discuss sleep study, assist with scheduling titration study, and to inform of order for oxygen

## 2021-08-26 NOTE — PROGRESS NOTES
Upon review of sleep study, pt noted to have significant desaturations  Will start home oxygen given low SpO2 of 66%  Patient needs to be scheduled for in lab CPAP titration

## 2021-09-01 ENCOUNTER — APPOINTMENT (OUTPATIENT)
Dept: LAB | Facility: HOSPITAL | Age: 76
End: 2021-09-01
Attending: INTERNAL MEDICINE
Payer: COMMERCIAL

## 2021-09-01 DIAGNOSIS — E78.49 FAMILIAL COMBINED HYPERLIPIDEMIA: ICD-10-CM

## 2021-09-01 DIAGNOSIS — Z12.31 SCREENING MAMMOGRAM, ENCOUNTER FOR: ICD-10-CM

## 2021-09-01 DIAGNOSIS — E89.0 POSTSURGICAL HYPOTHYROIDISM: ICD-10-CM

## 2021-09-01 DIAGNOSIS — E55.9 VITAMIN D DEFICIENCY: ICD-10-CM

## 2021-09-01 DIAGNOSIS — D70.9 NEUTROPENIC TYPHLITIS (HCC): ICD-10-CM

## 2021-09-01 DIAGNOSIS — K37 NEUTROPENIC TYPHLITIS (HCC): ICD-10-CM

## 2021-09-01 LAB
ALBUMIN SERPL BCP-MCNC: 4.4 G/DL (ref 3.4–4.8)
ALP SERPL-CCNC: 60.2 U/L (ref 35–140)
ALT SERPL W P-5'-P-CCNC: 16 U/L (ref 5–54)
ANION GAP SERPL CALCULATED.3IONS-SCNC: 9 MMOL/L (ref 4–13)
AST SERPL W P-5'-P-CCNC: 20 U/L (ref 15–41)
BASOPHILS # BLD AUTO: 0.02 THOUSANDS/ΜL (ref 0–0.1)
BASOPHILS NFR BLD AUTO: 1 % (ref 0–1)
BILIRUB SERPL-MCNC: 0.57 MG/DL (ref 0.3–1.2)
BUN SERPL-MCNC: 16 MG/DL (ref 6–20)
CALCIUM SERPL-MCNC: 9.9 MG/DL (ref 8.4–10.2)
CHLORIDE SERPL-SCNC: 106 MMOL/L (ref 96–108)
CHOLEST SERPL-MCNC: 220 MG/DL
CO2 SERPL-SCNC: 27 MMOL/L (ref 22–33)
CREAT SERPL-MCNC: 0.94 MG/DL (ref 0.4–1.1)
EOSINOPHIL # BLD AUTO: 0.27 THOUSAND/ΜL (ref 0–0.61)
EOSINOPHIL NFR BLD AUTO: 7 % (ref 0–6)
ERYTHROCYTE [DISTWIDTH] IN BLOOD BY AUTOMATED COUNT: 13.7 % (ref 11.6–15.1)
GFR SERPL CREATININE-BSD FRML MDRD: 60 ML/MIN/1.73SQ M
GLUCOSE P FAST SERPL-MCNC: 117 MG/DL (ref 70–105)
HCT VFR BLD AUTO: 44 % (ref 34.8–46.1)
HDLC SERPL-MCNC: 50 MG/DL
HGB BLD-MCNC: 14.3 G/DL (ref 11.5–15.4)
IMM GRANULOCYTES # BLD AUTO: 0.01 THOUSAND/UL (ref 0–0.2)
IMM GRANULOCYTES NFR BLD AUTO: 0 % (ref 0–2)
LDLC SERPL CALC-MCNC: 127 MG/DL (ref 0–100)
LYMPHOCYTES # BLD AUTO: 1.99 THOUSANDS/ΜL (ref 0.6–4.47)
LYMPHOCYTES NFR BLD AUTO: 49 % (ref 14–44)
MCH RBC QN AUTO: 29.5 PG (ref 26.8–34.3)
MCHC RBC AUTO-ENTMCNC: 32.5 G/DL (ref 31.4–37.4)
MCV RBC AUTO: 91 FL (ref 82–98)
MONOCYTES # BLD AUTO: 0.26 THOUSAND/ΜL (ref 0.17–1.22)
MONOCYTES NFR BLD AUTO: 7 % (ref 4–12)
NEUTROPHILS # BLD AUTO: 1.41 THOUSANDS/ΜL (ref 1.85–7.62)
NEUTS SEG NFR BLD AUTO: 36 % (ref 43–75)
NONHDLC SERPL-MCNC: 170 MG/DL
PLATELET # BLD AUTO: 188 THOUSANDS/UL (ref 149–390)
PMV BLD AUTO: 9.9 FL (ref 8.9–12.7)
POTASSIUM SERPL-SCNC: 4.7 MMOL/L (ref 3.5–5)
PROT SERPL-MCNC: 7.8 G/DL (ref 6.4–8.3)
RBC # BLD AUTO: 4.85 MILLION/UL (ref 3.81–5.12)
SODIUM SERPL-SCNC: 142 MMOL/L (ref 133–145)
TRIGL SERPL-MCNC: 212.6 MG/DL
TSH SERPL DL<=0.05 MIU/L-ACNC: 1.06 UIU/ML (ref 0.34–5.6)
WBC # BLD AUTO: 3.96 THOUSAND/UL (ref 4.31–10.16)

## 2021-09-01 PROCEDURE — 36415 COLL VENOUS BLD VENIPUNCTURE: CPT

## 2021-09-01 PROCEDURE — 82306 VITAMIN D 25 HYDROXY: CPT

## 2021-09-01 PROCEDURE — 84443 ASSAY THYROID STIM HORMONE: CPT

## 2021-09-01 PROCEDURE — 85025 COMPLETE CBC W/AUTO DIFF WBC: CPT

## 2021-09-01 PROCEDURE — 80053 COMPREHEN METABOLIC PANEL: CPT

## 2021-09-01 PROCEDURE — 80061 LIPID PANEL: CPT

## 2021-09-02 LAB — 25(OH)D3 SERPL-MCNC: 56.7 NG/ML (ref 30–100)

## 2021-09-08 NOTE — TELEPHONE ENCOUNTER
Spoke with the patient advised sleep study shows moderate DORIS and CPAP study recommended  Patient asking to talk with the doctor on the phone  Advised she needs to call the [place] [id number] office   Phone number provided and ask to speak with Dr Taylor Macias

## 2021-09-08 NOTE — TELEPHONE ENCOUNTER
Spoke with pt, informed her of needing CPAP titration study and oxygen at night  Explained that provider would like her to be set up right now with oxygen while waiting for titration study  Pt stated she is hesitant about oxygen because she moves a lot in her sleep and feels it will not help  She is also questioning why she would need a machine to help her sleep and what would happen if she were to decline use of machine  She also states that she feels she sleep well and has no trouble  Pt would like call from Dr Charisma Amaya to further discuss

## 2021-09-08 NOTE — TELEPHONE ENCOUNTER
Patient calling saying she would like to speak with Dr Samson Morning  She states she is not sure if she wants to do the titration study because she has to "sleep somewhere"  She said she moves around a lot and does not want to be sleeping somewhere else   Please call back at [phone]

## 2021-09-08 NOTE — TELEPHONE ENCOUNTER
Spoke to patient  We reviewed her sleep study  Patient declines sleep titration study  The patient is agreeable to wear supplemental oxygen overnight  Will start the patient on 2 L nasal cannula  Will check pulse ox in 1-2 weeks if patient is able to tolerate overnight oxygen  Would recommend follow-up with sleep medicine as she is interested in alternative to mask including possible inspire, oral appliance or other sleep devices

## 2021-09-10 DIAGNOSIS — G47.33 OSA (OBSTRUCTIVE SLEEP APNEA): Primary | ICD-10-CM

## 2021-09-14 ENCOUNTER — APPOINTMENT (OUTPATIENT)
Dept: LAB | Facility: HOSPITAL | Age: 76
End: 2021-09-14
Attending: INTERNAL MEDICINE
Payer: COMMERCIAL

## 2021-09-14 ENCOUNTER — HOSPITAL ENCOUNTER (OUTPATIENT)
Dept: RADIOLOGY | Facility: HOSPITAL | Age: 76
Discharge: HOME | End: 2021-09-14
Payer: COMMERCIAL

## 2021-09-14 DIAGNOSIS — M25.512 LEFT SHOULDER PAIN, UNSPECIFIED CHRONICITY: ICD-10-CM

## 2021-09-14 DIAGNOSIS — E78.49 FAMILIAL COMBINED HYPERLIPIDEMIA: ICD-10-CM

## 2021-09-14 DIAGNOSIS — R07.9 CHEST PAIN, UNSPECIFIED TYPE: ICD-10-CM

## 2021-09-14 LAB — TROPONIN I SERPL-MCNC: <0.03 NG/ML (ref 0–0.07)

## 2021-09-14 PROCEDURE — 73030 X-RAY EXAM OF SHOULDER: CPT

## 2021-09-14 PROCEDURE — 84484 ASSAY OF TROPONIN QUANT: CPT

## 2021-09-14 PROCEDURE — 36415 COLL VENOUS BLD VENIPUNCTURE: CPT

## 2021-09-14 PROCEDURE — 71046 X-RAY EXAM CHEST 2 VIEWS: CPT

## 2021-09-21 ENCOUNTER — HOSPITAL ENCOUNTER (OUTPATIENT)
Dept: RADIOLOGY | Facility: HOSPITAL | Age: 76
Discharge: HOME | End: 2021-09-21
Payer: COMMERCIAL

## 2021-09-21 DIAGNOSIS — Z12.31 SCREENING MAMMOGRAM, ENCOUNTER FOR: ICD-10-CM

## 2021-09-21 PROCEDURE — 77063 BREAST TOMOSYNTHESIS BI: CPT

## 2021-09-21 PROCEDURE — 77067 SCR MAMMO BI INCL CAD: CPT

## 2021-10-04 ENCOUNTER — HOSPITAL ENCOUNTER (OUTPATIENT)
Dept: CT IMAGING | Facility: HOSPITAL | Age: 76
Discharge: HOME | End: 2021-10-04
Payer: COMMERCIAL

## 2021-10-04 DIAGNOSIS — H90.A32 MIXED CONDUCTIVE AND SENSORINEURAL HEARING LOSS OF LEFT EAR WITH RESTRICTED HEARING OF RIGHT EAR: ICD-10-CM

## 2021-10-04 PROCEDURE — 70480 CT ORBIT/EAR/FOSSA W/O DYE: CPT

## 2021-10-04 PROCEDURE — G1004 CDSM NDSC: HCPCS

## 2021-10-04 NOTE — TELEPHONE ENCOUNTER
Called pt tried to advise her of all the testing that is ordered and went over note from Dr Shelbie Nick  Pt does not want to wear O2 at night, do the in lab sleep study  She wants to know what other options are  Scheduled a sleep appt with Dr Nani Cano in [place] to discuss other options

## 2021-10-04 NOTE — TELEPHONE ENCOUNTER
Pt ZOHRA stating she thought the doctor was going to order her a machine for sleep and she has not heard anything yet      Please advise

## 2021-10-05 NOTE — TELEPHONE ENCOUNTER
I saw that she doesn't have an appointment with me for 2-3 months  Not sure leaving this untreated for that long will be wise  Perhaps if you can get her in elsewhere with [facility] or also if I get a cancellation, that would be helpful    Alternatively, maybe she can see Fantasma Hidalgo in the meantime

## 2021-10-05 NOTE — TELEPHONE ENCOUNTER
She can be added to my schedule - fawn - you can find a spot that works  Overbooking on critical care is fine too    Thanks

## 2021-10-05 NOTE — TELEPHONE ENCOUNTER
Nehemiasm for pt to call the office back to schedule a follow up with Clarence Dye prior to her visit in [place]  With Dr Byron Lindo

## 2021-10-06 ENCOUNTER — TELEPHONE (OUTPATIENT)
Dept: OTOLARYNGOLOGY | Facility: CLINIC | Age: 76
End: 2021-10-06

## 2021-10-08 NOTE — TELEPHONE ENCOUNTER
Left another voicemail for pt advising her that the appt is scheduled for 10/25 at 9 am with Dr Arturo Welch and if this does not work to then call the office but if it works she does not need to call back

## 2021-10-08 NOTE — TELEPHONE ENCOUNTER
Patient returning call to schedule appt  I tried to reach [facility] but did not get an answer at the office  I advised patient that she was with a patient and I would have her call her back  Patient got very angry stating that she does not have 100 minutes on her phone  I explained she was going to fit her into Dr Novak's schedule and I cannot do that   [phone]

## 2021-10-15 ENCOUNTER — TELEPHONE (OUTPATIENT)
Dept: OTOLARYNGOLOGY | Facility: CLINIC | Age: 76
End: 2021-10-15

## 2021-10-25 ENCOUNTER — APPOINTMENT (OUTPATIENT)
Dept: LAB | Facility: HOSPITAL | Age: 76
End: 2021-10-25
Attending: INTERNAL MEDICINE
Payer: COMMERCIAL

## 2021-10-25 ENCOUNTER — DOCUMENTATION (OUTPATIENT)
Dept: PULMONOLOGY | Facility: CLINIC | Age: 76
End: 2021-10-25

## 2021-10-25 ENCOUNTER — OFFICE VISIT (OUTPATIENT)
Dept: PULMONOLOGY | Facility: CLINIC | Age: 76
End: 2021-10-25
Payer: COMMERCIAL

## 2021-10-25 VITALS
HEART RATE: 82 BPM | TEMPERATURE: 96.6 F | DIASTOLIC BLOOD PRESSURE: 70 MMHG | HEIGHT: 63 IN | WEIGHT: 150 LBS | OXYGEN SATURATION: 99 % | SYSTOLIC BLOOD PRESSURE: 122 MMHG | BODY MASS INDEX: 26.58 KG/M2

## 2021-10-25 DIAGNOSIS — E03.9 MYXEDEMA HEART DISEASE: ICD-10-CM

## 2021-10-25 DIAGNOSIS — I26.99 OTHER PULMONARY EMBOLISM WITHOUT ACUTE COR PULMONALE, UNSPECIFIED CHRONICITY (HCC): ICD-10-CM

## 2021-10-25 DIAGNOSIS — I51.9 MYXEDEMA HEART DISEASE: ICD-10-CM

## 2021-10-25 DIAGNOSIS — G47.34 NOCTURNAL HYPOXIA: ICD-10-CM

## 2021-10-25 DIAGNOSIS — G47.33 OSA (OBSTRUCTIVE SLEEP APNEA): ICD-10-CM

## 2021-10-25 DIAGNOSIS — R06.00 DYSPNEA ON EXERTION: Primary | ICD-10-CM

## 2021-10-25 LAB — TSH SERPL DL<=0.05 MIU/L-ACNC: 1.55 UIU/ML (ref 0.34–5.6)

## 2021-10-25 PROCEDURE — 84443 ASSAY THYROID STIM HORMONE: CPT

## 2021-10-25 PROCEDURE — 36415 COLL VENOUS BLD VENIPUNCTURE: CPT

## 2021-10-25 PROCEDURE — 99214 OFFICE O/P EST MOD 30 MIN: CPT | Performed by: INTERNAL MEDICINE

## 2021-10-25 RX ORDER — LEVOTHYROXINE SODIUM 0.07 MG/1
75 TABLET ORAL
COMMUNITY
Start: 2021-09-13

## 2021-10-26 ENCOUNTER — TELEPHONE (OUTPATIENT)
Dept: PULMONOLOGY | Facility: CLINIC | Age: 76
End: 2021-10-26

## 2021-10-26 DIAGNOSIS — I26.99 OTHER PULMONARY EMBOLISM WITHOUT ACUTE COR PULMONALE, UNSPECIFIED CHRONICITY (HCC): Primary | ICD-10-CM

## 2021-11-02 ENCOUNTER — OFFICE VISIT (OUTPATIENT)
Dept: SLEEP CENTER | Facility: CLINIC | Age: 76
End: 2021-11-02
Payer: COMMERCIAL

## 2021-11-02 VITALS
DIASTOLIC BLOOD PRESSURE: 70 MMHG | HEIGHT: 63 IN | SYSTOLIC BLOOD PRESSURE: 138 MMHG | WEIGHT: 150 LBS | BODY MASS INDEX: 26.58 KG/M2

## 2021-11-02 DIAGNOSIS — G47.33 OBSTRUCTIVE SLEEP APNEA (ADULT) (PEDIATRIC): ICD-10-CM

## 2021-11-02 DIAGNOSIS — G47.33 OSA (OBSTRUCTIVE SLEEP APNEA): Primary | ICD-10-CM

## 2021-11-02 PROCEDURE — 99213 OFFICE O/P EST LOW 20 MIN: CPT | Performed by: INTERNAL MEDICINE

## 2021-11-10 ENCOUNTER — HOSPITAL ENCOUNTER (OUTPATIENT)
Dept: PULMONOLOGY | Facility: HOSPITAL | Age: 76
Discharge: HOME | End: 2021-11-10
Attending: INTERNAL MEDICINE
Payer: COMMERCIAL

## 2021-11-10 DIAGNOSIS — R06.00 DYSPNEA ON EXERTION: ICD-10-CM

## 2021-11-10 PROCEDURE — 94729 DIFFUSING CAPACITY: CPT

## 2021-11-10 PROCEDURE — 94060 EVALUATION OF WHEEZING: CPT

## 2021-11-10 PROCEDURE — 94060 EVALUATION OF WHEEZING: CPT | Performed by: INTERNAL MEDICINE

## 2021-11-10 PROCEDURE — 94726 PLETHYSMOGRAPHY LUNG VOLUMES: CPT

## 2021-11-10 PROCEDURE — 94726 PLETHYSMOGRAPHY LUNG VOLUMES: CPT | Performed by: INTERNAL MEDICINE

## 2021-11-10 PROCEDURE — 94729 DIFFUSING CAPACITY: CPT | Performed by: INTERNAL MEDICINE

## 2021-11-10 PROCEDURE — 94760 N-INVAS EAR/PLS OXIMETRY 1: CPT

## 2021-11-10 RX ORDER — ALBUTEROL SULFATE 2.5 MG/3ML
2.5 SOLUTION RESPIRATORY (INHALATION) ONCE
Status: COMPLETED | OUTPATIENT
Start: 2021-11-10 | End: 2021-11-10

## 2021-11-10 RX ADMIN — ALBUTEROL SULFATE 2.5 MG: 2.5 SOLUTION RESPIRATORY (INHALATION) at 12:45

## 2021-11-15 ENCOUNTER — TELEPHONE (OUTPATIENT)
Dept: PULMONOLOGY | Facility: CLINIC | Age: 76
End: 2021-11-15

## 2021-11-18 ENCOUNTER — TELEPHONE (OUTPATIENT)
Dept: PULMONOLOGY | Facility: CLINIC | Age: 76
End: 2021-11-18

## 2021-11-29 ENCOUNTER — TELEPHONE (OUTPATIENT)
Dept: SLEEP CENTER | Facility: CLINIC | Age: 76
End: 2021-11-29

## 2021-12-06 ENCOUNTER — TELEPHONE (OUTPATIENT)
Dept: PULMONOLOGY | Facility: CLINIC | Age: 76
End: 2021-12-06

## 2021-12-10 ENCOUNTER — TELEPHONE (OUTPATIENT)
Dept: PULMONOLOGY | Facility: CLINIC | Age: 76
End: 2021-12-10

## 2021-12-13 ENCOUNTER — HOSPITAL ENCOUNTER (OUTPATIENT)
Dept: VASCULAR ULTRASOUND | Facility: HOSPITAL | Age: 76
Discharge: HOME | End: 2021-12-13
Payer: COMMERCIAL

## 2021-12-13 DIAGNOSIS — I82.503 CHRONIC EMBOLISM AND THROMBOSIS OF UNSPECIFIED DEEP VEINS OF LOWER EXTREMITY, BILATERAL (HCC): ICD-10-CM

## 2021-12-13 PROCEDURE — 93971 EXTREMITY STUDY: CPT | Performed by: SURGERY

## 2021-12-13 PROCEDURE — 93971 EXTREMITY STUDY: CPT

## 2021-12-15 ENCOUNTER — TELEPHONE (OUTPATIENT)
Dept: SLEEP CENTER | Facility: CLINIC | Age: 76
End: 2021-12-15

## 2021-12-17 ENCOUNTER — OFFICE VISIT (OUTPATIENT)
Dept: PULMONOLOGY | Facility: CLINIC | Age: 76
End: 2021-12-17
Payer: COMMERCIAL

## 2021-12-17 VITALS
DIASTOLIC BLOOD PRESSURE: 78 MMHG | SYSTOLIC BLOOD PRESSURE: 122 MMHG | HEART RATE: 72 BPM | HEIGHT: 64 IN | WEIGHT: 150 LBS | TEMPERATURE: 97.1 F | BODY MASS INDEX: 25.61 KG/M2 | OXYGEN SATURATION: 97 %

## 2021-12-17 DIAGNOSIS — J44.9 OBSTRUCTIVE LUNG DISEASE (HCC): Primary | ICD-10-CM

## 2021-12-17 DIAGNOSIS — G47.33 OSA (OBSTRUCTIVE SLEEP APNEA): ICD-10-CM

## 2021-12-17 PROCEDURE — 99213 OFFICE O/P EST LOW 20 MIN: CPT | Performed by: NURSE PRACTITIONER

## 2021-12-17 RX ORDER — ALBUTEROL SULFATE 90 UG/1
1 AEROSOL, METERED RESPIRATORY (INHALATION) EVERY 4 HOURS PRN
Qty: 18 G | Refills: 5 | Status: SHIPPED | OUTPATIENT
Start: 2021-12-17 | End: 2022-01-25 | Stop reason: HOSPADM

## 2021-12-22 ENCOUNTER — OFFICE VISIT (OUTPATIENT)
Dept: SLEEP CENTER | Facility: CLINIC | Age: 76
End: 2021-12-22
Payer: COMMERCIAL

## 2021-12-22 VITALS — HEIGHT: 64 IN | SYSTOLIC BLOOD PRESSURE: 112 MMHG | BODY MASS INDEX: 25.75 KG/M2 | DIASTOLIC BLOOD PRESSURE: 74 MMHG

## 2021-12-22 DIAGNOSIS — Z71.89 CPAP USE COUNSELING: Primary | ICD-10-CM

## 2021-12-22 DIAGNOSIS — G47.33 OSA (OBSTRUCTIVE SLEEP APNEA): ICD-10-CM

## 2021-12-22 PROCEDURE — 99214 OFFICE O/P EST MOD 30 MIN: CPT | Performed by: INTERNAL MEDICINE

## 2021-12-23 ENCOUNTER — TELEPHONE (OUTPATIENT)
Dept: SLEEP CENTER | Facility: CLINIC | Age: 76
End: 2021-12-23

## 2021-12-28 ENCOUNTER — TELEPHONE (OUTPATIENT)
Dept: SLEEP CENTER | Facility: CLINIC | Age: 76
End: 2021-12-28

## 2022-01-13 ENCOUNTER — OFFICE VISIT (OUTPATIENT)
Dept: VASCULAR SURGERY | Facility: CLINIC | Age: 77
End: 2022-01-13
Payer: COMMERCIAL

## 2022-01-13 VITALS
HEIGHT: 64 IN | SYSTOLIC BLOOD PRESSURE: 122 MMHG | WEIGHT: 150 LBS | HEART RATE: 99 BPM | DIASTOLIC BLOOD PRESSURE: 80 MMHG | BODY MASS INDEX: 25.61 KG/M2

## 2022-01-13 DIAGNOSIS — M79.89 LEFT ARM SWELLING: Primary | ICD-10-CM

## 2022-01-13 DIAGNOSIS — I82.5Z9 CHRONIC DEEP VEIN THROMBOSIS (DVT) OF LOWER LEG (HCC): ICD-10-CM

## 2022-01-13 DIAGNOSIS — I27.82 OTHER CHRONIC PULMONARY EMBOLISM WITHOUT ACUTE COR PULMONALE (HCC): ICD-10-CM

## 2022-01-13 PROCEDURE — 99203 OFFICE O/P NEW LOW 30 MIN: CPT | Performed by: SURGERY

## 2022-01-13 RX ORDER — ASPIRIN 81 MG/1
81 TABLET, CHEWABLE ORAL EVERY MORNING
COMMUNITY

## 2022-01-13 NOTE — PATIENT INSTRUCTIONS
Chronic deep vein thrombosis (DVT) of lower leg (HCC)  Chronic left lower extremity tibial DVT  History of DVT when she was pregnant with her 2nd child years ago and recurrent DVT with PE 10 yrs ago  Treated with 3 months of coumadin  Hypercoagulable work-up per patient was negative  She no longer follows with heme/onc  She wears compression stockings daily 20-30mmHg and elevates her legs in the evening  She has chronic left leg swelling and varicosities but symptoms are controlled      -We discussed the pathophysiology of venous disease, available treatment options and indications for treatment  No surgical treatment options for chronic DVT or venous insufficiency in setting of previous DVT  -Continue conservative measures   This includes the daily use of gradient compression socks, periodic leg elevation and regular exercise  -Continue medical management with ASA 81mg         Left arm swelling  Left arm swelling and pain for months  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

## 2022-01-13 NOTE — LETTER
January 13, 2022     Gerardo Sinclair, [address] [address]    Patient: Sheryl Mcwilliams   YOB: 1945   Date of Visit: 1/13/2022       Dear Dr Bernardo Juarez:    Below are the relevant portions of my assessment and plan of care  Diagnoses and all orders for this visit:    Chronic deep vein thrombosis (DVT) of lower leg (HCC)  Chronic left lower extremity tibial DVT  History of DVT when she was pregnant with her 2nd child years ago and recurrent DVT with PE 10 yrs ago  Treated with 3 months of coumadin  Hypercoagulable work-up per patient was negative  She no longer follows with heme/onc  She wears compression stockings daily 20-30mmHg and elevates her legs in the evening  She has chronic left leg swelling and varicosities but symptoms are controlled      -We discussed the pathophysiology of venous disease, available treatment options and indications for treatment  No surgical treatment options for chronic DVT or venous insufficiency in setting of previous DVT  -Continue conservative measures   This includes the daily use of gradient compression socks, periodic leg elevation and regular exercise  -Continue medical management with ASA 81mg         Left arm swelling  Left arm swelling and pain for months  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

## 2022-01-13 NOTE — ASSESSMENT & PLAN NOTE
Chronic left lower extremity tibial DVT  History of DVT when she was pregnant with her 2nd child years ago and recurrent DVT with PE 10 yrs ago  Treated with 3 months of coumadin  Hypercoagulable work-up per patient was negative  She no longer follows with heme/onc  She wears compression stockings daily 20-30mmHg and elevates her legs in the evening  She has chronic left leg swelling and varicosities but symptoms are controlled     -We discussed the pathophysiology of venous disease, available treatment options and indications for treatment  No surgical treatment options for chronic DVT or venous insufficiency in setting of previous DVT  -Continue conservative measures  This includes the daily use of gradient compression socks, periodic leg elevation and regular exercise  -Continue medical management with ASA 81mg

## 2022-01-13 NOTE — ASSESSMENT & PLAN NOTE
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

## 2022-01-13 NOTE — PROGRESS NOTES
Assessment/Plan:    Chronic deep vein thrombosis (DVT) of lower leg (HCC)  Chronic left lower extremity tibial DVT  History of DVT when she was pregnant with her 2nd child years ago and recurrent DVT with PE 10 yrs ago  Treated with 3 months of coumadin  Hypercoagulable work-up per patient was negative  She no longer follows with heme/onc  She wears compression stockings daily 20-30mmHg and elevates her legs in the evening  She has chronic left leg swelling and varicosities but symptoms are controlled     -We discussed the pathophysiology of venous disease, available treatment options and indications for treatment  No surgical treatment options for chronic DVT or venous insufficiency in setting of previous DVT  -Continue conservative measures  This includes the daily use of gradient compression socks, periodic leg elevation and regular exercise  -Continue medical management with ASA 81mg  Left arm swelling  Left arm swelling and pain for months  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  Diagnoses and all orders for this visit:    Left arm swelling  -     VAS upper limb venous duplex scan, unilateral/limited; Future    Chronic deep vein thrombosis (DVT) of lower leg (HCC)    Other chronic pulmonary embolism without acute cor pulmonale (HCC)    Other orders  -     aspirin 81 mg chewable tablet; Chew 81 mg daily        I have spent 30 minutes with Patient  today in which greater than 50% of this time was spent in counseling/coordination of care regarding Intructions for management, Importance of tx compliance and Impressions  Subjective:      Patient ID: Boaz Medley is a 77 y o  female  New pt is here for consultation  LEV done on 12/13  Pt c/o swelling legs, discoloration and rash  She is a non smoker and is taking aspirin  HPI  Ms Elizabeth Perea is a 78yo female nonsmoker with history of DORIS, COPD, left leg tibial vein DVTx2 and PE (10yrs ago) treated with 3 month course of coumadin who presents as a new patient for chronic left leg complaints and left arm swelling  She has had chronic left leg swelling and varicosities for years which she manages with therapeutic compression 20-30mmHg and leg elevation at night  Her symptoms are well controlled with conservative measures  She had previous hypercoagulable testing which was unremarkable  She has a significant family history of cancer but no DVT/PE  She herself does not have a cancer history  Her biggest complaint today is ongoing left upper arm swelling and tenderness  She was found to have osteoarthritis in the shoulder and treated with injections which provided mild relief but continues to have symptoms  She is worried she may have another DVT or malignancy  She takes daily baby ASA  The following portions of the patient's history were reviewed and updated as appropriate: allergies, current medications, past family history, past medical history, past social history, past surgical history and problem list     I have reviewed and made appropriate changes to the review of systems input by the medical assistant      Vitals:    01/13/22 1028   BP: 122/80   BP Location: Left arm   Patient Position: Sitting   Cuff Size: Standard   Pulse: 99   Weight: 68 kg (150 lb)   Height: 5' 4" (1 626 m)       Patient Active Problem List   Diagnosis    History of colon polyps    History of Helicobacter pylori infection    Dyspepsia    Hypothyroidism    Postoperative hypothyroidism    Other pulmonary embolism without acute cor pulmonale (HCC)    Sensorineural hearing loss (SNHL) of both ears    Mixed conductive and sensorineural hearing loss of left ear with restricted hearing of right ear    Globus sensation    Other dysphagia    DORIS (obstructive sleep apnea)    Obstructive lung disease (HCC)    Chronic deep vein thrombosis (DVT) of lower leg (HCC)    Left arm swelling       Past Surgical History:   Procedure Laterality Date    COLONOSCOPY      DILATION AND CURETTAGE, DIAGNOSTIC / THERAPEUTIC      THYROIDECTOMY, PARTIAL      2020       Family History   Problem Relation Age of Onset    No Known Problems Mother     Cancer Sister [de-identified]   [facility] No Known Problems Daughter     No Known Problems Sister     No Known Problems Maternal Aunt     No Known Problems Maternal Aunt     No Known Problems Paternal Aunt     No Known Problems Paternal Aunt     Breast cancer Other 59       Social History     Socioeconomic History    Marital status:       Spouse name: Not on file    Number of children: Not on file    Years of education: Not on file    Highest education level: Not on file   Occupational History    Not on file   Tobacco Use    Smoking status: Never Smoker    Smokeless tobacco: Never Used   Vaping Use    Vaping Use: Never used   Substance and Sexual Activity    Alcohol use: Yes     Comment: social rare    Drug use: Never    Sexual activity: Not on file   Other Topics Concern    Not on file   Social History Narrative    Not on file     Social Determinants of Health     Financial Resource Strain: Not on file   Food Insecurity: Not on file   Transportation Needs: Not on file   Physical Activity: Not on file   Stress: Not on file   Social Connections: Not on file   Intimate Partner Violence: Not on file   Housing Stability: Not on file       Allergies   Allergen Reactions    Crab (Diagnostic) - Food Allergy Itching and Rash    Penicillins Rash         Current Outpatient Medications:     aspirin 81 mg chewable tablet, Chew 81 mg daily, Disp: , Rfl:     cholecalciferol (VITAMIN D3) 400 units tablet, Take 400 Units by mouth daily  , Disp: , Rfl:     cyanocobalamin (VITAMIN B-12) 100 mcg tablet, Take by mouth daily Pt unsure dose  , Disp: , Rfl:     Diclofenac Sodium (VOLTAREN) 1 %,  , Disp: , Rfl:     levothyroxine (Synthroid) 75 mcg tablet, Take 75 mcg by mouth daily, Disp: , Rfl:     magnesium gluconate (MAGONATE) 500 MG tablet, Take 500 mg by mouth 2 (two) times a day  , Disp: , Rfl:     Omega-3 Fatty Acids (fish oil) 1,000 mg, Take 1,000 mg by mouth daily Pt unsure dose  , Disp: , Rfl:     vitamin E 100 UNIT capsule, Take 100 Units by mouth daily Pt unsure  , Disp: , Rfl:     albuterol (PROVENTIL HFA,VENTOLIN HFA) 90 mcg/act inhaler, Inhale 1 puff every 4 (four) hours as needed for wheezing or shortness of breath (Patient not taking: Reported on 1/13/2022 ), Disp: 18 g, Rfl: 5    Ascorbic Acid (VITAMIN C) 500 MG CHEW, Chew 500 mg (Patient not taking: Reported on 1/13/2022 ), Disp: , Rfl:     loratadine (CLARITIN) 10 mg tablet, Take 10 mg by mouth 2 (two) times a day as needed for allergies (Patient not taking: Reported on 1/13/2022 ), Disp: , Rfl:     triamcinolone (KENALOG) 0 1 % cream, APPLY A THIN LAYER TO AFFECTED AREA TWICE A DAY (Patient not taking: Reported on 1/13/2022), Disp: , Rfl:     Review of Systems   Constitutional: Positive for activity change  HENT: Positive for dental problem  Eyes: Positive for photophobia  Cardiovascular: Positive for leg swelling  Gastrointestinal: Negative  Endocrine: Positive for cold intolerance  Genitourinary: Negative  Musculoskeletal: Positive for joint swelling  Neurological: Negative  Hematological:        Clots easily   Psychiatric/Behavioral: The patient is nervous/anxious  I have personally reviewed the ROS entered by MA and agree as documented      Objective:      /80 (BP Location: Left arm, Patient Position: Sitting, Cuff Size: Standard)   Pulse 99   Ht 5' 4" (1 626 m)   Wt 68 kg (150 lb)   BMI 25 75 kg/m²          Physical Exam  Constitutional:       Appearance: Normal appearance  HENT:      Head: Normocephalic and atraumatic  Cardiovascular:      Rate and Rhythm: Normal rate  Pulses: Normal pulses  Pulmonary:      Effort: Pulmonary effort is normal    Abdominal:      General: There is no distension  Palpations: Abdomen is soft  Tenderness: There is no abdominal tenderness  Musculoskeletal:         General: Swelling and tenderness present  Normal range of motion  Cervical back: Normal range of motion and neck supple  Left lower leg: Edema present  Comments: Left upper arm swelling and tenderness to palpation   Skin:     General: Skin is warm and dry  Capillary Refill: Capillary refill takes less than 2 seconds  Comments: Left leg truncal varicosities   Neurological:      General: No focal deficit present  Mental Status: She is alert and oriented to person, place, and time  Psychiatric:         Mood and Affect: Mood normal          Behavior: Behavior normal          Thought Content:  Thought content normal          Judgment: Judgment normal

## 2022-01-19 ENCOUNTER — HOSPITAL ENCOUNTER (OUTPATIENT)
Dept: VASCULAR ULTRASOUND | Facility: HOSPITAL | Age: 77
Discharge: HOME | End: 2022-01-19
Attending: SURGERY
Payer: COMMERCIAL

## 2022-01-19 DIAGNOSIS — M79.89 LEFT ARM SWELLING: ICD-10-CM

## 2022-01-19 PROCEDURE — 93971 EXTREMITY STUDY: CPT

## 2022-01-20 PROCEDURE — 93971 EXTREMITY STUDY: CPT | Performed by: SURGERY

## 2022-01-21 ENCOUNTER — TELEPHONE (OUTPATIENT)
Dept: VASCULAR SURGERY | Facility: CLINIC | Age: 77
End: 2022-01-21

## 2022-01-21 DIAGNOSIS — M79.89 LEFT ARM SWELLING: Primary | ICD-10-CM

## 2022-01-21 DIAGNOSIS — Z01.812 PRE-PROCEDURE LAB EXAM: ICD-10-CM

## 2022-01-21 RX ORDER — METHYLPREDNISOLONE 32 MG/1
TABLET ORAL
Qty: 2 TABLET | Refills: 0 | Status: SHIPPED | OUTPATIENT
Start: 2022-01-21 | End: 2022-01-25 | Stop reason: HOSPADM

## 2022-01-21 RX ORDER — DIPHENHYDRAMINE HCL 50 MG
CAPSULE ORAL
Qty: 1 CAPSULE | Refills: 0 | Status: SHIPPED | OUTPATIENT
Start: 2022-01-21 | End: 2022-01-25 | Stop reason: HOSPADM

## 2022-01-21 NOTE — TELEPHONE ENCOUNTER
Patient called for the results of the left UE venous duplex that was done on 1/19/22  Letter is in [facility], and made patient aware of results  Patient saw Dr Katie Jenkins on 1/13/22  Per office visit note, if duplex is unremarkable, would recommend left upper extremity CT for further work up  Patient has been having ongoing left upper extremity swelling and tenderness  Routed to triage provider to advise if a CT should be ordered at this time

## 2022-01-21 NOTE — TELEPHONE ENCOUNTER
Spoke with patient  She stated that she has an allergy to CT dye  This is not listed as an allergy on her chart  Patient reports that she had dye once for a CT, and ended up with a rash on her chest  Patient stated, "I will not do intravenous dyes ever again"

## 2022-01-21 NOTE — TELEPHONE ENCOUNTER
Spoke with patient  She is agreeable to Benadryl and steroid prior to contrast administration  Gave patient the number for central scheduling to schedule the CTV  Also made her aware that she needs to have the BMP drawn a few days prior to the CTV  Patient verbalized understanding  She will call the office back after she schedules the CTV to schedule the follow up appointment with Dr Francois Fearing

## 2022-01-21 NOTE — TELEPHONE ENCOUNTER
Discussed with Dr Lacy Peña  The upper extremity duplex was normal without evidence of acute or chronic thrombus  Would recommend at this time we order a CTV of the chest with left arm included  If she is due for a BMP which can order that as well    Would recommend follow-up with Dr Lacy Peña in the office after imaging is obtained

## 2022-01-23 ENCOUNTER — APPOINTMENT (EMERGENCY)
Dept: CT IMAGING | Facility: HOSPITAL | Age: 77
DRG: 101 | End: 2022-01-23
Payer: COMMERCIAL

## 2022-01-23 ENCOUNTER — HOSPITAL ENCOUNTER (INPATIENT)
Facility: HOSPITAL | Age: 77
LOS: 1 days | Discharge: HOME | DRG: 101 | End: 2022-01-25
Attending: INTERNAL MEDICINE | Admitting: INTERNAL MEDICINE
Payer: COMMERCIAL

## 2022-01-23 DIAGNOSIS — R25.1 EPISODE OF SHAKING: ICD-10-CM

## 2022-01-23 DIAGNOSIS — R55 SYNCOPE: Primary | ICD-10-CM

## 2022-01-23 LAB
ALBUMIN SERPL BCP-MCNC: 4.4 G/DL (ref 3.4–4.8)
ALP SERPL-CCNC: 56.1 U/L (ref 35–140)
ALT SERPL W P-5'-P-CCNC: 15 U/L (ref 5–54)
ANION GAP SERPL CALCULATED.3IONS-SCNC: 7 MMOL/L (ref 4–13)
AST SERPL W P-5'-P-CCNC: 19 U/L (ref 15–41)
ATRIAL RATE: 79 BPM
BASOPHILS # BLD AUTO: 0.02 THOUSANDS/ΜL (ref 0–0.1)
BASOPHILS NFR BLD AUTO: 0 % (ref 0–1)
BILIRUB SERPL-MCNC: 0.61 MG/DL (ref 0.3–1.2)
BNP SERPL-MCNC: 25.2 PG/ML (ref 1–100)
BUN SERPL-MCNC: 24 MG/DL (ref 6–20)
CALCIUM SERPL-MCNC: 9.7 MG/DL (ref 8.4–10.2)
CARDIAC TROPONIN I PNL SERPL HS: 3 NG/L
CHLORIDE SERPL-SCNC: 103 MMOL/L (ref 96–108)
CO2 SERPL-SCNC: 29 MMOL/L (ref 22–33)
CREAT SERPL-MCNC: 0.88 MG/DL (ref 0.4–1.1)
EOSINOPHIL # BLD AUTO: 0.1 THOUSAND/ΜL (ref 0–0.61)
EOSINOPHIL NFR BLD AUTO: 2 % (ref 0–6)
ERYTHROCYTE [DISTWIDTH] IN BLOOD BY AUTOMATED COUNT: 13.5 % (ref 11.6–15.1)
FLUAV RNA RESP QL NAA+PROBE: NEGATIVE
FLUBV RNA RESP QL NAA+PROBE: NEGATIVE
GFR SERPL CREATININE-BSD FRML MDRD: 64 ML/MIN/1.73SQ M
GLUCOSE SERPL-MCNC: 97 MG/DL (ref 65–140)
HCT VFR BLD AUTO: 45 % (ref 34.8–46.1)
HGB BLD-MCNC: 14.7 G/DL (ref 11.5–15.4)
IMM GRANULOCYTES # BLD AUTO: 0.01 THOUSAND/UL (ref 0–0.2)
IMM GRANULOCYTES NFR BLD AUTO: 0 % (ref 0–2)
LYMPHOCYTES # BLD AUTO: 1.75 THOUSANDS/ΜL (ref 0.6–4.47)
LYMPHOCYTES NFR BLD AUTO: 36 % (ref 14–44)
MAGNESIUM SERPL-MCNC: 2 MG/DL (ref 1.6–2.6)
MCH RBC QN AUTO: 30 PG (ref 26.8–34.3)
MCHC RBC AUTO-ENTMCNC: 32.7 G/DL (ref 31.4–37.4)
MCV RBC AUTO: 92 FL (ref 82–98)
MONOCYTES # BLD AUTO: 0.44 THOUSAND/ΜL (ref 0.17–1.22)
MONOCYTES NFR BLD AUTO: 9 % (ref 4–12)
NEUTROPHILS # BLD AUTO: 2.57 THOUSANDS/ΜL (ref 1.85–7.62)
NEUTS SEG NFR BLD AUTO: 53 % (ref 43–75)
NRBC BLD AUTO-RTO: 0 /100 WBCS
P AXIS: 75 DEGREES
PLATELET # BLD AUTO: 190 THOUSANDS/UL (ref 149–390)
PMV BLD AUTO: 9.8 FL (ref 8.9–12.7)
POTASSIUM SERPL-SCNC: 4.6 MMOL/L (ref 3.5–5)
PR INTERVAL: 186 MS
PROCALCITONIN SERPL-MCNC: <0.05 NG/ML
PROLACTIN SERPL-MCNC: 7.2 NG/ML
PROT SERPL-MCNC: 7.9 G/DL (ref 6.4–8.3)
QRS AXIS: -10 DEGREES
QRSD INTERVAL: 74 MS
QT INTERVAL: 359 MS
QTC INTERVAL: 409 MS
RBC # BLD AUTO: 4.9 MILLION/UL (ref 3.81–5.12)
RSV RNA RESP QL NAA+PROBE: NEGATIVE
SARS-COV-2 RNA RESP QL NAA+PROBE: NEGATIVE
SODIUM SERPL-SCNC: 139 MMOL/L (ref 133–145)
T WAVE AXIS: 23 DEGREES
TSH SERPL DL<=0.05 MIU/L-ACNC: 1.24 UIU/ML (ref 0.34–5.6)
VENTRICULAR RATE: 78 BPM
WBC # BLD AUTO: 4.89 THOUSAND/UL (ref 4.31–10.16)

## 2022-01-23 PROCEDURE — 99220 PR INITIAL OBSERVATION CARE/DAY 70 MINUTES: CPT | Performed by: INTERNAL MEDICINE

## 2022-01-23 PROCEDURE — G1004 CDSM NDSC: HCPCS

## 2022-01-23 PROCEDURE — 83735 ASSAY OF MAGNESIUM: CPT | Performed by: PHYSICIAN ASSISTANT

## 2022-01-23 PROCEDURE — 93010 ELECTROCARDIOGRAM REPORT: CPT | Performed by: INTERNAL MEDICINE

## 2022-01-23 PROCEDURE — 0241U HB NFCT DS VIR RESP RNA 4 TRGT: CPT | Performed by: PHYSICIAN ASSISTANT

## 2022-01-23 PROCEDURE — 84484 ASSAY OF TROPONIN QUANT: CPT | Performed by: PHYSICIAN ASSISTANT

## 2022-01-23 PROCEDURE — 99285 EMERGENCY DEPT VISIT HI MDM: CPT | Performed by: PHYSICIAN ASSISTANT

## 2022-01-23 PROCEDURE — 96365 THER/PROPH/DIAG IV INF INIT: CPT

## 2022-01-23 PROCEDURE — 84443 ASSAY THYROID STIM HORMONE: CPT | Performed by: PHYSICIAN ASSISTANT

## 2022-01-23 PROCEDURE — 96375 TX/PRO/DX INJ NEW DRUG ADDON: CPT

## 2022-01-23 PROCEDURE — 71275 CT ANGIOGRAPHY CHEST: CPT

## 2022-01-23 PROCEDURE — 36415 COLL VENOUS BLD VENIPUNCTURE: CPT | Performed by: PHYSICIAN ASSISTANT

## 2022-01-23 PROCEDURE — 84145 PROCALCITONIN (PCT): CPT | Performed by: INTERNAL MEDICINE

## 2022-01-23 PROCEDURE — 84146 ASSAY OF PROLACTIN: CPT | Performed by: PHYSICIAN ASSISTANT

## 2022-01-23 PROCEDURE — 70498 CT ANGIOGRAPHY NECK: CPT

## 2022-01-23 PROCEDURE — 70496 CT ANGIOGRAPHY HEAD: CPT

## 2022-01-23 PROCEDURE — 83880 ASSAY OF NATRIURETIC PEPTIDE: CPT | Performed by: PHYSICIAN ASSISTANT

## 2022-01-23 PROCEDURE — 96366 THER/PROPH/DIAG IV INF ADDON: CPT

## 2022-01-23 PROCEDURE — 99285 EMERGENCY DEPT VISIT HI MDM: CPT

## 2022-01-23 PROCEDURE — 80053 COMPREHEN METABOLIC PANEL: CPT | Performed by: PHYSICIAN ASSISTANT

## 2022-01-23 PROCEDURE — 93005 ELECTROCARDIOGRAM TRACING: CPT

## 2022-01-23 PROCEDURE — 85025 COMPLETE CBC W/AUTO DIFF WBC: CPT | Performed by: PHYSICIAN ASSISTANT

## 2022-01-23 RX ORDER — ONDANSETRON 2 MG/ML
4 INJECTION INTRAMUSCULAR; INTRAVENOUS EVERY 6 HOURS PRN
Status: DISCONTINUED | OUTPATIENT
Start: 2022-01-23 | End: 2022-01-25 | Stop reason: HOSPADM

## 2022-01-23 RX ORDER — ACETAMINOPHEN 325 MG/1
650 TABLET ORAL EVERY 6 HOURS PRN
Status: DISCONTINUED | OUTPATIENT
Start: 2022-01-23 | End: 2022-01-25 | Stop reason: HOSPADM

## 2022-01-23 RX ORDER — ASPIRIN 81 MG/1
81 TABLET, CHEWABLE ORAL DAILY
Status: DISCONTINUED | OUTPATIENT
Start: 2022-01-23 | End: 2022-01-25 | Stop reason: HOSPADM

## 2022-01-23 RX ORDER — LEVOTHYROXINE SODIUM 0.07 MG/1
75 TABLET ORAL
Status: DISCONTINUED | OUTPATIENT
Start: 2022-01-23 | End: 2022-01-25 | Stop reason: HOSPADM

## 2022-01-23 RX ORDER — DIPHENHYDRAMINE HYDROCHLORIDE 50 MG/ML
50 INJECTION INTRAMUSCULAR; INTRAVENOUS ONCE
Status: COMPLETED | OUTPATIENT
Start: 2022-01-23 | End: 2022-01-23

## 2022-01-23 RX ORDER — METHYLPREDNISOLONE SODIUM SUCCINATE 125 MG/2ML
125 INJECTION, POWDER, LYOPHILIZED, FOR SOLUTION INTRAMUSCULAR; INTRAVENOUS ONCE
Status: COMPLETED | OUTPATIENT
Start: 2022-01-23 | End: 2022-01-23

## 2022-01-23 RX ORDER — MAGNESIUM HYDROXIDE/ALUMINUM HYDROXICE/SIMETHICONE 120; 1200; 1200 MG/30ML; MG/30ML; MG/30ML
30 SUSPENSION ORAL EVERY 6 HOURS PRN
Status: DISCONTINUED | OUTPATIENT
Start: 2022-01-23 | End: 2022-01-25 | Stop reason: HOSPADM

## 2022-01-23 RX ORDER — LORATADINE 10 MG/1
10 TABLET ORAL AS NEEDED
COMMUNITY

## 2022-01-23 RX ADMIN — SODIUM CHLORIDE, SODIUM LACTATE, POTASSIUM CHLORIDE, AND CALCIUM CHLORIDE 1000 ML: .6; .31; .03; .02 INJECTION, SOLUTION INTRAVENOUS at 11:46

## 2022-01-23 RX ADMIN — ENOXAPARIN SODIUM 40 MG: 100 INJECTION SUBCUTANEOUS at 15:39

## 2022-01-23 RX ADMIN — IOHEXOL 100 ML: 350 INJECTION, SOLUTION INTRAVENOUS at 13:32

## 2022-01-23 RX ADMIN — METHYLPREDNISOLONE SODIUM SUCCINATE 125 MG: 125 INJECTION, POWDER, FOR SOLUTION INTRAMUSCULAR; INTRAVENOUS at 12:43

## 2022-01-23 RX ADMIN — CYANOCOBALAMIN TAB 500 MCG 500 MCG: 500 TAB at 15:38

## 2022-01-23 RX ADMIN — DIPHENHYDRAMINE HYDROCHLORIDE 50 MG: 50 INJECTION, SOLUTION INTRAMUSCULAR; INTRAVENOUS at 12:44

## 2022-01-23 NOTE — PLAN OF CARE
Problem: PAIN - ADULT  Goal: Verbalizes/displays adequate comfort level or baseline comfort level  Description: Interventions:  - Encourage patient to monitor pain and request assistance  - Assess pain using appropriate pain scale  - Administer analgesics based on type and severity of pain and evaluate response  - Implement non-pharmacological measures as appropriate and evaluate response  - Consider cultural and social influences on pain and pain management  - Notify physician/advanced practitioner if interventions unsuccessful or patient reports new pain  Outcome: Progressing     Problem: SAFETY ADULT  Goal: Patient will remain free of falls  Description: INTERVENTIONS:  - Educate patient/family on patient safety including physical limitations  - Instruct patient to call for assistance with activity   - Consult OT/PT to assist with strengthening/mobility   - Keep Call bell within reach  - Keep bed low and locked with side rails adjusted as appropriate  - Keep care items and personal belongings within reach  - Initiate and maintain comfort rounds  - Offer Toileting every 2 Hours, in advance of need  - Initiate/Maintain bed alarm  - Apply yellow socks and bracelet for high fall risk patients  - Consider moving patient to room near nurses station  Outcome: Progressing

## 2022-01-23 NOTE — ASSESSMENT & PLAN NOTE
· POA with syncopal event versus seizure   · CT head and Neck---no hemodynamically significant stenosis   · CTA PE study---no evidence of central pulmonary embolus to the segmental   Limited evaluation noted  1 8 x 1 3 cm which shaped opacity in the posterior peripheral left lower lobe noted, no clinical signs of pneumonia    · Prolactin level pending  · Telemetry monitoring  · Check orhtostatic BP  · Will get Echocardiogram   · Out of bed with assistance

## 2022-01-23 NOTE — H&P
Aaron 46  H&P- Wally Ho 1945, 77 y o  female MRN: [id number]  Unit/Bed#: ED 16 Encounter: [id number]  Primary Care Provider: Althea Paul MD   Date and time admitted to hospital: 1/23/2022 10:58 AM    Chronic deep vein thrombosis (DVT) of lower leg [facility]  Assessment & Plan  · Known history not currently on [facility] had DVT after 2nd child and then 10 years later had DVT w/PE treated with coumadin for 3 months  Patient endorsed at negative coagulopathy studies with hematology years ago and no longer follows with them  · Recent vascular work up due LUE swelling negative duplex study for DVT 1/19/2022 and negative duplex study of LLE for DVT or RLE  Obstructive lung disease ([facility] )  Assessment & Plan  · Known history    DORIS (obstructive sleep apnea)  Assessment & Plan  · Patient with known moderate sleep apnea  · Seen last be her sleep specialist 12/22/21 due to inability to find a CPAP mask that she can tolerate  · Patient made aware given her obstructive history wearing her cpap is a must and recommend she be fitting for mandibular advancement device  · Would recommend patient's own machine if possible if not will work cpap qhs while inpatient  Hypothyroidism  Assessment & Plan  · Known history  · TSH normal   · Continue synthroid    * Syncope  Assessment & Plan  · POA with syncopal event versus seizure   · CT head and Neck---no hemodynamically significant stenosis   · CTA PE study---no evidence of central pulmonary embolus to the segmental   Limited evaluation noted  1 8 x 1 3 cm which shaped opacity in the posterior peripheral left lower lobe noted, no clinical signs of pneumonia  · Prolactin level pending  · Telemetry monitoring  · Check orhtostatic BP  · Will get Echocardiogram   · Out of bed with assistance       VTE Pharmacologic Prophylaxis:   Moderate Risk (Score 3-4) - Pharmacological DVT Prophylaxis Ordered: enoxaparin (Lovenox)    Code Status: Level 1 - Full Code  Discussion with family: Updated  (friend) at bedside  Anticipated Length of Stay: Patient will be admitted on an observation basis with an anticipated length of stay of less than 2 midnights secondary to syncope   Total Time for Visit, including Counseling / Coordination of Care: 45 minutes Greater than 50% of this total time spent on direct patient counseling and coordination of care  Chief Complaint: syncope    History of Present Illness:  Lico Baltazar is a 77 y o  female with a PMH of hypothyroidism, COPD, DORIS, history of DVT presents with syncope  And patient felt patient woke up this morning and felt lightheaded and she went to the kitchen but while standing the she fell back on the stove in kitchen and the eyes were rolled up and staring the roof  Patient friend who was there by her side was able to hold her  and he noticed that her left arm was shaking and patient was not responding  The episode lasted almost like 3 minutes ,no fall noted  Patient was held steady and walked to the bedroom and she was laid on her bed and she looked pale and staring to the side  Patient does not remember the episode  The patient denies of any headache or blurry vision  Patient denies any fever chills or cough  Patient denies of any nausea vomiting or bladder or bowel incontinence  Review of Systems:  Review of Systems   Constitutional: Negative  HENT: Negative  Eyes: Negative  Respiratory: Negative  Cardiovascular: Negative  Gastrointestinal: Negative  Endocrine: Negative  Genitourinary: Negative  Musculoskeletal: Negative  Skin: Negative  Allergic/Immunologic: Negative  Neurological: Positive for tremors, syncope and light-headedness  Hematological: Negative  Psychiatric/Behavioral: Negative          Past Medical and Surgical History:   Past Medical History:   Diagnosis Date    Arthritis     Borderline diabetes     diet controlled    Colon polyp     Depression     Disease of thyroid gland     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    DVT (deep venous thrombosis) (HCC)     hx- left leg    Pulmonary emboli (HCC)     during pregnancy    Rash     arms on occasion, cause unknown       Past Surgical History:   Procedure Laterality Date    COLONOSCOPY      DILATION AND CURETTAGE, DIAGNOSTIC / THERAPEUTIC      THYROIDECTOMY, PARTIAL      2020       Meds/Allergies:  Prior to Admission medications    Medication Sig Start Date End Date Taking? Authorizing Provider   albuterol (PROVENTIL HFA,VENTOLIN HFA) 90 mcg/act inhaler Inhale 1 puff every 4 (four) hours as needed for wheezing or shortness of breath  Patient not taking: Reported on 1/13/2022 12/17/21   SILVER Martinez   Ascorbic Acid (VITAMIN C) 500 MG CHEW Chew 500 mg  Patient not taking: Reported on 1/13/2022     Historical Provider, MD   aspirin 81 mg chewable tablet Chew 81 mg daily    Historical Provider, MD   cholecalciferol (VITAMIN D3) 400 units tablet Take 400 Units by mouth daily      Historical Provider, MD   cyanocobalamin (VITAMIN B-12) 100 mcg tablet Take by mouth daily Pt unsure dose      Historical Provider, MD   Diclofenac Sodium (VOLTAREN) 1 %   8/2/21   Historical Provider, MD   diphenhydrAMINE (BENADRYL) 50 mg capsule Take 1 tablet (50 mg) by mouth one hour prior to contrast administration 1/21/22   Chema Prado PA-C   levothyroxine (Synthroid) 75 mcg tablet Take 75 mcg by mouth daily 9/13/21   Historical Provider, MD   loratadine (CLARITIN) 10 mg tablet Take 10 mg by mouth 2 (two) times a day as needed for allergies  Patient not taking: Reported on 1/13/2022     Historical Provider, MD   magnesium gluconate (MAGONATE) 500 MG tablet Take 500 mg by mouth 2 (two) times a day      Historical Provider, MD   methylPREDNISolone (MEDROL) 32 MG tablet Take 1 tablet by mouth 12 hours prior to contrast administration, and another tablet by mouth 2 hours prior to contrast administration  1/21/22   Margene Claude, PA-C   Omega-3 Fatty Acids (fish oil) 1,000 mg Take 1,000 mg by mouth daily Pt unsure dose      Historical Provider, MD   triamcinolone (KENALOG) 0 1 % cream APPLY A THIN LAYER TO AFFECTED AREA TWICE A DAY  Patient not taking: Reported on 1/13/2022 4/26/21   Historical Provider, MD   vitamin E 100 UNIT capsule Take 100 Units by mouth daily Pt unsure      Historical Provider, MD     I have reviewed home medications with patient personally  Allergies: Allergies   Allergen Reactions    Crab (Diagnostic) - Food Allergy Itching and Rash    Iv Contrast [Iodinated Diagnostic Agents] Rash    Penicillins Rash       Social History:  Marital Status:     Occupation:  Retired  Patient Pre-hospital Living Situation: Home  Patient Pre-hospital Level of Mobility: walks  Patient Pre-hospital Diet Restrictions: nil  Substance Use History:   Social History     Substance and Sexual Activity   Alcohol Use Yes    Comment: social rare     Social History     Tobacco Use   Smoking Status Never Smoker   Smokeless Tobacco Never Used     Social History     Substance and Sexual Activity   Drug Use Never       Family History:  Family History   Problem Relation Age of Onset    No Known Problems Mother     Cancer Sister [de-identified]    No Known Problems Daughter     No Known Problems Sister     No Known Problems Maternal Aunt     No Known Problems Maternal Aunt     No Known Problems Paternal Aunt     No Known Problems Paternal Aunt     Breast cancer Other 59       Physical Exam:     Vitals:   Blood Pressure: 154/80 (01/23/22 1245)  Pulse: 88 (01/23/22 1245)  Temperature: 97 6 °F (36 4 °C) (01/23/22 1102)  Temp Source: Oral (01/23/22 1102)  Respirations: 17 (01/23/22 1245)  SpO2: 100 % (01/23/22 1245)    Physical Exam   HEENT-PERRLA, moist oral mucosa  Neck-supple, no JVD elevation   Respiratory-equal air entry bilaterally, no rales or rhonchi  Cardiovascular system-S1, S2 heard, no murmur or gallops or rubs  Abdomen-soft, nontender, no guarding or rigidity, bowel sounds heard  Extremities-no pedal edema  Peripheral pulses palpable  Musculoskeletal-no contractures  Central nervous system-no acute focal neurological deficit ,no sensory or motor deficit noted  Skin-no rash noted        Additional Data:     Lab Results:  Results from last 7 days   Lab Units 01/23/22  1137   WBC Thousand/uL 4 89   HEMOGLOBIN g/dL 14 7   HEMATOCRIT % 45 0   PLATELETS Thousands/uL 190   NEUTROS PCT % 53   LYMPHS PCT % 36   MONOS PCT % 9   EOS PCT % 2     Results from last 7 days   Lab Units 01/23/22  1137   SODIUM mmol/L 139   POTASSIUM mmol/L 4 6   CHLORIDE mmol/L 103   CO2 mmol/L 29   BUN mg/dL 24*   CREATININE mg/dL 0 88   ANION GAP mmol/L 7   CALCIUM mg/dL 9 7   ALBUMIN g/dL 4 4   TOTAL BILIRUBIN mg/dL 0 61   ALK PHOS U/L 56 1   ALT U/L 15   AST U/L 19   GLUCOSE RANDOM mg/dL 97                       Imaging: Reviewed radiology reports from this admission including: chest CT scan  CTA head and neck with and without contrast   Final Result by Lauro Barrett MD (01/23 6953)         1  No hemodynamically significant stenosis in the major arteries of the neck  2   No intracranial aneurysm or major intracranial arterial stenosis  Mild to moderate right greater than left atherosclerotic disease of the proximal portions of the middle cerebral arteries  3   No acute intracranial hemorrhage  Workstation performed: [id number]         CTA ED chest PE Study   Final Result by Eduardo Kim DO (01/23 0802)      1  No evidence of central pulmonary embolus to the segmental level  Limited evaluation of the subsegmental vessels  2  1 8 x 1 3 cm wedge-shaped opacity posterior peripheral left lower lobe  Differential considerations include focal pneumonia (in the appropriate clinical setting), focal atelectasis with possibility of pulmonary infarct due to subsegmental embolus not    entirely excluded    Much less likely consideration would be a true mass  Follow-up CT chest in 2-3 months recommended to reevaluate this finding  The study was marked in Epic for follow-up  Workstation performed: [id number]             EKG and Other Studies Reviewed on Admission:   · EKG: NSR  HR rate is controlled , no acute st-t changes  ** Please Note: This note has been constructed using a voice recognition system   **

## 2022-01-23 NOTE — ASSESSMENT & PLAN NOTE
· Patient with known moderate sleep apnea  · Seen last be her sleep specialist 12/22/21 due to inability to find a CPAP mask that she can tolerate  · Patient made aware given her obstructive history wearing her cpap is a must and recommend she be fitting for mandibular advancement device  · Would recommend patient's own machine if possible if not will work cpap qhs while inpatient

## 2022-01-23 NOTE — ASSESSMENT & PLAN NOTE
· Known history not currently on [facility] had DVT after 2nd child and then 10 years later had DVT w/PE treated with coumadin for 3 months  Patient endorsed at negative coagulopathy studies with hematology years ago and no longer follows with them  · Recent vascular work up due LUE swelling negative duplex study for DVT 1/19/2022 and negative duplex study of LLE for DVT or RLE

## 2022-01-24 ENCOUNTER — APPOINTMENT (OUTPATIENT)
Dept: VASCULAR ULTRASOUND | Facility: HOSPITAL | Age: 77
DRG: 101 | End: 2022-01-24
Payer: COMMERCIAL

## 2022-01-24 ENCOUNTER — TELEPHONE (OUTPATIENT)
Dept: VASCULAR SURGERY | Facility: CLINIC | Age: 77
End: 2022-01-24

## 2022-01-24 PROBLEM — R93.89 ABNORMAL CHEST CT: Status: ACTIVE | Noted: 2022-01-24

## 2022-01-24 LAB
ANION GAP SERPL CALCULATED.3IONS-SCNC: 6 MMOL/L (ref 4–13)
BUN SERPL-MCNC: 18 MG/DL (ref 6–20)
CALCIUM SERPL-MCNC: 9.3 MG/DL (ref 8.4–10.2)
CHLORIDE SERPL-SCNC: 107 MMOL/L (ref 96–108)
CO2 SERPL-SCNC: 27 MMOL/L (ref 22–33)
CREAT SERPL-MCNC: 0.72 MG/DL (ref 0.4–1.1)
D DIMER PPP FEU-MCNC: 0.55 UG/ML FEU
ERYTHROCYTE [DISTWIDTH] IN BLOOD BY AUTOMATED COUNT: 13.5 % (ref 11.6–15.1)
GFR SERPL CREATININE-BSD FRML MDRD: 82 ML/MIN/1.73SQ M
GLUCOSE SERPL-MCNC: 107 MG/DL (ref 65–140)
HCT VFR BLD AUTO: 42.8 % (ref 34.8–46.1)
HGB BLD-MCNC: 13.9 G/DL (ref 11.5–15.4)
MCH RBC QN AUTO: 29.8 PG (ref 26.8–34.3)
MCHC RBC AUTO-ENTMCNC: 32.5 G/DL (ref 31.4–37.4)
MCV RBC AUTO: 92 FL (ref 82–98)
PLATELET # BLD AUTO: 202 THOUSANDS/UL (ref 149–390)
PMV BLD AUTO: 10.6 FL (ref 8.9–12.7)
POTASSIUM SERPL-SCNC: 4.2 MMOL/L (ref 3.5–5)
PROCALCITONIN SERPL-MCNC: <0.05 NG/ML
RBC # BLD AUTO: 4.66 MILLION/UL (ref 3.81–5.12)
SODIUM SERPL-SCNC: 140 MMOL/L (ref 133–145)
WBC # BLD AUTO: 11.32 THOUSAND/UL (ref 4.31–10.16)

## 2022-01-24 PROCEDURE — G0426 INPT/ED TELECONSULT50: HCPCS | Performed by: PSYCHIATRY & NEUROLOGY

## 2022-01-24 PROCEDURE — 93970 EXTREMITY STUDY: CPT

## 2022-01-24 PROCEDURE — 84145 PROCALCITONIN (PCT): CPT | Performed by: INTERNAL MEDICINE

## 2022-01-24 PROCEDURE — 85379 FIBRIN DEGRADATION QUANT: CPT | Performed by: INTERNAL MEDICINE

## 2022-01-24 PROCEDURE — 99239 HOSP IP/OBS DSCHRG MGMT >30: CPT | Performed by: INTERNAL MEDICINE

## 2022-01-24 PROCEDURE — 85027 COMPLETE CBC AUTOMATED: CPT | Performed by: INTERNAL MEDICINE

## 2022-01-24 PROCEDURE — 80048 BASIC METABOLIC PNL TOTAL CA: CPT | Performed by: INTERNAL MEDICINE

## 2022-01-24 PROCEDURE — 93970 EXTREMITY STUDY: CPT | Performed by: SURGERY

## 2022-01-24 RX ADMIN — ENOXAPARIN SODIUM 40 MG: 100 INJECTION SUBCUTANEOUS at 10:55

## 2022-01-24 RX ADMIN — CYANOCOBALAMIN TAB 500 MCG 500 MCG: 500 TAB at 10:55

## 2022-01-24 RX ADMIN — LEVOTHYROXINE SODIUM 75 MCG: 25 TABLET ORAL at 05:17

## 2022-01-24 RX ADMIN — ASPIRIN 81 MG: 81 TABLET, CHEWABLE ORAL at 10:55

## 2022-01-24 NOTE — TELEPHONE ENCOUNTER
Patient called and requested to make provider aware that she is in the hospital  Patient stated that she had a CTA of the chest done while inpatient, and does not believe that she needs the CT venogram chest/left arm that was ordered on 1/21/22, when she is discharged  This was ordered for left arm swelling  Routed to provider to advise if this CT venogram will need to be scheduled upon patient discharge, or what next steps should be

## 2022-01-24 NOTE — TELEPHONE ENCOUNTER
Spoke with patient and made her aware that the CT venogram is still recommended to fully evaluate her UE swelling

## 2022-01-24 NOTE — ASSESSMENT & PLAN NOTE
- 1 8 x 1 3 cm wedge-shaped opacity posterior peripheral left lower lobe noted     - Further work-up and management as per medicine team

## 2022-01-24 NOTE — ASSESSMENT & PLAN NOTE
· POA with syncopal event versus seizure   · CT head and Neck---no hemodynamically significant stenosis   · CTA PE study---no evidence of central pulmonary embolus to the segmental   Limited evaluation noted  1 8 x 1 3 cm which shaped opacity in the posterior peripheral left lower lobe noted, no clinical signs of pneumonia  · Prolactin level pending  · Telemetry monitoring  · Check orhtostatic BP  · Will get Echocardiogram   · Out of bed with assistance   · Likely syncope secondary to convulsive seizure  No antiepileptic drugs indicated at this time  Will recommend ambulatory EEG  Neurology input appreciated  · 2D echocardiogram awaited

## 2022-01-24 NOTE — ED PROVIDER NOTES
History  Chief Complaint   Patient presents with    Syncope     Boyfriend witnessed pt looking up and went backwards, left arms shaking around 10 am  Patient would answer boyfriends questions  Patient reports she does not recall incident  Per boyfriend pt did not hit head     This is a 81-year-old female with past medical history significant for a pulmonary embolus not currently on any blood thinners in addition to a chronic DVT presenting to the emergency department today status post syncopal episode  The patient notes that she fell backwards and this was witnessed by her boyfriend  She did not strike her head but her boyfriend notes that her left arm a shaking the entire time  During this event, the patient was able to talk the entire time and was alert and oriented  She has no prior seizure history  She has no bowel or bladder incontinence and did not bite her tongue  She denies any prodromal symptoms prior to the fall including chest pain and shortness of breath  She notes this did not occur while she was trying to stand up  She denies any dizziness, lightheadedness, or visual disturbances  The patient does have left-sided chronic lower extremity swelling that is no worse than it normally is  The patient denies any head or neck injuries  The patient denies other complaints at this time  History provided by:  Patient   used: No    Syncope  Episode history:  Single  Most recent episode:   Today  Progression:  Resolved  Chronicity:  New  Witnessed: yes    Relieved by:  Nothing  Worsened by:  Nothing  Ineffective treatments:  None tried  Associated symptoms: no anxiety, no chest pain, no confusion, no diaphoresis, no difficulty breathing, no dizziness, no fever, no focal sensory loss, no focal weakness, no headaches, no malaise/fatigue, no nausea, no palpitations, no recent injury, no recent surgery, no rectal bleeding, no seizures, no shortness of breath, no visual change, no vomiting and no weakness    Risk factors: vascular disease    Risk factors: no seizures        Prior to Admission Medications   Prescriptions Last Dose Informant Patient Reported? Taking? Ascorbic Acid (VITAMIN C) 500 MG CHEW  Self Yes No   Sig: Chew 500 mg   Patient not taking: Reported on 1/13/2022    Diclofenac Sodium (VOLTAREN) 1 %  Self Yes No   Sig:     Omega-3 Fatty Acids (fish oil) 1,000 mg  Self Yes No   Sig: Take 1,000 mg by mouth daily Pt unsure dose     albuterol (PROVENTIL HFA,VENTOLIN HFA) 90 mcg/act inhaler  Self No No   Sig: Inhale 1 puff every 4 (four) hours as needed for wheezing or shortness of breath   Patient not taking: Reported on 1/13/2022    aspirin 81 mg chewable tablet  Self Yes No   Sig: Chew 81 mg daily   cholecalciferol (VITAMIN D3) 400 units tablet  Self Yes No   Sig: Take 400 Units by mouth daily     cyanocobalamin (VITAMIN B-12) 100 mcg tablet  Self Yes No   Sig: Take by mouth daily Pt unsure dose     diphenhydrAMINE (BENADRYL) 50 mg capsule   No No   Sig: Take 1 tablet (50 mg) by mouth one hour prior to contrast administration   levothyroxine (Synthroid) 75 mcg tablet  Self Yes No   Sig: Take 75 mcg by mouth daily   loratadine (CLARITIN) 10 mg tablet  Self Yes No   Sig: Take 10 mg by mouth 2 (two) times a day as needed for allergies   Patient not taking: Reported on 1/13/2022    magnesium gluconate (MAGONATE) 500 MG tablet  Self Yes No   Sig: Take 500 mg by mouth 2 (two) times a day     methylPREDNISolone (MEDROL) 32 MG tablet   No No   Sig: Take 1 tablet by mouth 12 hours prior to contrast administration, and another tablet by mouth 2 hours prior to contrast administration     triamcinolone (KENALOG) 0 1 % cream  Self Yes No   Sig: APPLY A THIN LAYER TO AFFECTED AREA TWICE A DAY   Patient not taking: Reported on 1/13/2022   vitamin E 100 UNIT capsule  Self Yes No   Sig: Take 100 Units by mouth daily Pt unsure        Facility-Administered Medications: None       Past Medical History:   Diagnosis Date    Arthritis     Borderline diabetes     diet controlled    Colon polyp     Depression     Disease of thyroid gland     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    DVT (deep venous thrombosis) (HCC)     hx- left leg    Pulmonary emboli (HCC)     during pregnancy    Rash     arms on occasion, cause unknown       Past Surgical History:   Procedure Laterality Date    COLONOSCOPY      DILATION AND CURETTAGE, DIAGNOSTIC / THERAPEUTIC      THYROIDECTOMY, PARTIAL      2020       Family History   Problem Relation Age of Onset    No Known Problems Mother     Cancer Sister [de-identified]   Jesse Torres No Known Problems Daughter     No Known Problems Sister     No Known Problems Maternal Aunt     No Known Problems Maternal Aunt     No Known Problems Paternal Aunt     No Known Problems Paternal Aunt     Breast cancer Other 59     I have reviewed and agree with the history as documented  E-Cigarette/Vaping    E-Cigarette Use Never User      E-Cigarette/Vaping Substances    Nicotine No     THC No     CBD No     Flavoring No     Other No     Unknown No      Social History     Tobacco Use    Smoking status: Never Smoker    Smokeless tobacco: Never Used   Vaping Use    Vaping Use: Never used   Substance Use Topics    Alcohol use: Yes     Comment: social rare    Drug use: Never       Review of Systems   Constitutional: Negative for appetite change, chills, diaphoresis, fever and malaise/fatigue  Eyes: Negative for visual disturbance  Respiratory: Negative for cough, chest tightness, shortness of breath and wheezing  Cardiovascular: Positive for syncope  Negative for chest pain, palpitations and leg swelling  Gastrointestinal: Negative for abdominal pain, constipation, diarrhea, nausea and vomiting  Genitourinary: Negative for dysuria  Musculoskeletal: Negative for neck pain and neck stiffness  Skin: Negative for rash and wound  Neurological: Positive for syncope   Negative for dizziness, focal weakness, seizures, weakness, light-headedness, numbness and headaches  Psychiatric/Behavioral: Negative for confusion  All other systems reviewed and are negative  Physical Exam  Physical Exam  Vitals and nursing note reviewed  Constitutional:       General: She is not in acute distress  Appearance: Normal appearance  She is normal weight  She is not ill-appearing, toxic-appearing or diaphoretic  HENT:      Head: Normocephalic and atraumatic  Right Ear: Tympanic membrane, ear canal and external ear normal  There is no impacted cerumen  Left Ear: Tympanic membrane, ear canal and external ear normal  There is no impacted cerumen  Ears:      Comments: Negative osman sign bilaterally  No hemotympanum bilaterally     Nose: Nose normal  No congestion or rhinorrhea  Mouth/Throat:      Mouth: Mucous membranes are moist       Pharynx: No oropharyngeal exudate or posterior oropharyngeal erythema  Eyes:      General: No scleral icterus  Right eye: No discharge  Left eye: No discharge  Extraocular Movements: Extraocular movements intact  Conjunctiva/sclera: Conjunctivae normal       Comments: Negative raccoon eyes bilaterally   Neck:      Comments: Non meningeal  No tenderness to palpation to cervical spine or paracervical musculature bilaterally  No tenderness to palpation throughout the entire spine or paraspinal musculature  Cardiovascular:      Rate and Rhythm: Normal rate and regular rhythm  Pulses: Normal pulses  Heart sounds: Normal heart sounds  No murmur heard  No friction rub  No gallop  Comments: 2+ radial and DP pulses bilaterally  Pulmonary:      Effort: Pulmonary effort is normal  No respiratory distress  Breath sounds: Normal breath sounds  No stridor  No wheezing, rhonchi or rales  Comments: Clear to auscultation bilaterally without adventitious breath sounds  Chest:      Chest wall: No tenderness     Abdominal: General: Abdomen is flat  There is no distension  Palpations: Abdomen is soft  Tenderness: There is no abdominal tenderness  There is no right CVA tenderness, left CVA tenderness, guarding or rebound  Comments: Soft, nontender, nondistended, and without organomegaly   Musculoskeletal:         General: Normal range of motion  Cervical back: Normal range of motion  No tenderness  Right lower leg: No edema  Left lower leg: Edema present  Comments: Left-sided lower extremity swelling that the patient notes is baseline for her; nonpitting   Skin:     General: Skin is warm and dry  Capillary Refill: Capillary refill takes less than 2 seconds  Coloration: Skin is not jaundiced or pale  Neurological:      General: No focal deficit present  Mental Status: She is alert and oriented to person, place, and time  Mental status is at baseline        Comments: 5/5 strength in bilateral upper and lower extremities  Normal sensation to bilateral upper and lower extremities  Patient is able to smile, frown, puff out cheeks, and raise eyebrows bilaterally symmetrically without difficulty  No cerebellar symptoms or dysdiadochokinesia  Negative pronator drift  Negative Romberg  Alert oriented to person, place, and time  No stroke-like symptoms   Psychiatric:         Mood and Affect: Mood normal          Behavior: Behavior normal          Vital Signs  ED Triage Vitals   Temperature Pulse Respirations Blood Pressure SpO2   01/23/22 1102 01/23/22 1103 01/23/22 1103 01/23/22 1103 01/23/22 1103   97 6 °F (36 4 °C) 87 18 156/81 100 %      Temp Source Heart Rate Source Patient Position - Orthostatic VS BP Location FiO2 (%)   01/23/22 1102 01/23/22 1103 01/23/22 1245 01/23/22 1103 --   Oral Monitor Lying Right arm       Pain Score       01/23/22 1103       No Pain           Vitals:    01/23/22 1245 01/23/22 1534 01/23/22 1745 01/23/22 1921   BP: 154/80 138/78 138/80 140/61   Pulse: 88 96 94 87 Patient Position - Orthostatic VS: Lying Lying Lying Lying         Visual Acuity      ED Medications  Medications   aspirin chewable tablet 81 mg (81 mg Oral Not Given 1/23/22 1535)   cyanocobalamin (VITAMIN B-12) tablet 500 mcg (500 mcg Oral Given 1/23/22 1538)   levothyroxine tablet 75 mcg (75 mcg Oral Not Given 1/23/22 1535)   aluminum-magnesium hydroxide-simethicone (MYLANTA) oral suspension 30 mL (has no administration in time range)   ondansetron (ZOFRAN) injection 4 mg (has no administration in time range)   acetaminophen (TYLENOL) tablet 650 mg (has no administration in time range)   enoxaparin (LOVENOX) subcutaneous injection 40 mg (40 mg Subcutaneous Given 1/23/22 1539)   lactated ringers bolus 1,000 mL (0 mL Intravenous Stopped 1/23/22 1539)   diphenhydrAMINE (BENADRYL) injection 50 mg (50 mg Intravenous Given 1/23/22 1244)   methylPREDNISolone sodium succinate (Solu-MEDROL) injection 125 mg (125 mg Intravenous Given 1/23/22 1243)   iohexol (OMNIPAQUE) 350 MG/ML injection (SINGLE-DOSE) 100 mL (100 mL Intravenous Given 1/23/22 1332)       Diagnostic Studies  Results Reviewed     Procedure Component Value Units Date/Time    Prolactin [[id number]]  (Normal) Collected: 01/23/22 1137    Lab Status: Final result Specimen: Blood from Arm, Left Updated: 01/23/22 1746     Prolactin 7 2 ng/mL     Procalcitonin with AM Reflex [[id number]]  (Normal) Collected: 01/23/22 1137    Lab Status: Final result Specimen: Blood from Arm, Left Updated: 01/23/22 1555     Procalcitonin <0 05 ng/ml     Procalcitonin Reflex [[id number]]     Lab Status: No result Specimen: Blood     COVID/FLU/RSV - 2 hour TAT [[id number]]  (Normal) Collected: 01/23/22 1137    Lab Status: Final result Specimen: Nares from Nose Updated: 01/23/22 1240     SARS-CoV-2 Negative     INFLUENZA A PCR Negative     INFLUENZA B PCR Negative     RSV PCR Negative    Narrative:      FOR PEDIATRIC PATIENTS - copy/paste COVID Guidelines URL to browser: [email or web address]    SARS-CoV-2 assay is a Nucleic Acid Amplification assay intended for the  qualitative detection of nucleic acid from SARS-CoV-2 in nasopharyngeal  swabs  Results are for the presumptive identification of SARS-CoV-2 RNA  Positive results are indicative of infection with SARS-CoV-2, the virus  causing COVID-19, but do not rule out bacterial infection or co-infection  with other viruses  Laboratories within the [place] and its  territories are required to report all positive results to the appropriate  public health authorities  Negative results do not preclude SARS-CoV-2  infection and should not be used as the sole basis for treatment or other  patient management decisions  Negative results must be combined with  clinical observations, patient history, and epidemiological information  This test has not been FDA cleared or approved  This test has been authorized by FDA under an Emergency Use Authorization  (EUA)  This test is only authorized for the duration of time the  declaration that circumstances exist justifying the authorization of the  emergency use of an in vitro diagnostic tests for detection of SARS-CoV-2  virus and/or diagnosis of COVID-19 infection under section 564(b)(1) of  the Act, 21 U  S C  272BPI-7(Y)(2), unless the authorization is terminated  or revoked sooner  The test has been validated but independent review by FDA  and CLIA is pending  Test performed using [employer] GeneXpert: This RT-PCR assay targets N2,  a region unique to SARS-CoV-2  A conserved region in the E-gene was chosen  for pan-Sarbecovirus detection which includes SARS-CoV-2      TSH [[id number]]  (Normal) Collected: 01/23/22 1137    Lab Status: Final result Specimen: Blood from Arm, Left Updated: 01/23/22 1227     TSH 3RD GENERATON 1 242 uIU/mL     Narrative:      Patients undergoing fluorescein dye angiography may retain small amounts of fluorescein in the body for 48-72 hours post procedure  Samples containing fluorescein can produce falsely depressed TSH values  If the patient had this procedure,a specimen should be resubmitted post fluorescein clearance        B-Type Natriuretic Peptide(BNP) CA, GH, EA Campuses Only [[id number]]  (Normal) Collected: 01/23/22 1137    Lab Status: Final result Specimen: Blood from Arm, Left Updated: 01/23/22 1222     BNP 25 2 pg/mL     HS Troponin 0hr (reflex protocol) [[id number]] Collected: 01/23/22 1137    Lab Status: Final result Specimen: Blood from Arm, Left Updated: 01/23/22 1221     hs TnI 0hr 3 ng/L     HS Troponin I 2hr [[id number]]     Lab Status: No result Specimen: Blood     HS Troponin I 4hr [[id number]]     Lab Status: No result Specimen: Blood     Comprehensive metabolic panel [[id number]]  (Abnormal) Collected: 01/23/22 1137    Lab Status: Final result Specimen: Blood from Arm, Left Updated: 01/23/22 1215     Sodium 139 mmol/L      Potassium 4 6 mmol/L      Chloride 103 mmol/L      CO2 29 mmol/L      ANION GAP 7 mmol/L      BUN 24 mg/dL      Creatinine 0 88 mg/dL      Glucose 97 mg/dL      Calcium 9 7 mg/dL      AST 19 U/L      ALT 15 U/L      Alkaline Phosphatase 56 1 U/L      Total Protein 7 9 g/dL      Albumin 4 4 g/dL      Total Bilirubin 0 61 mg/dL      eGFR 64 ml/min/1 73sq m     Narrative:      Calvin guidelines for Chronic Kidney Disease (CKD):     Stage 1 with normal or high GFR (GFR > 90 mL/min/1 73 square meters)    Stage 2 Mild CKD (GFR = 60-89 mL/min/1 73 square meters)    Stage 3A Moderate CKD (GFR = 45-59 mL/min/1 73 square meters)    Stage 3B Moderate CKD (GFR = 30-44 mL/min/1 73 square meters)    Stage 4 Severe CKD (GFR = 15-29 mL/min/1 73 square meters)    Stage 5 End Stage CKD (GFR <15 mL/min/1 73 square meters)  Note: GFR calculation is accurate only with a steady state creatinine    Magnesium [[id number]]  (Normal) Collected: 01/23/22 1137    Lab Status: Final result Specimen: Blood from Arm, Left Updated: 01/23/22 1215     Magnesium 2 0 mg/dL     CBC and differential [[id number]] Collected: 01/23/22 1137    Lab Status: Final result Specimen: Blood from Arm, Left Updated: 01/23/22 1157     WBC 4 89 Thousand/uL      RBC 4 90 Million/uL      Hemoglobin 14 7 g/dL      Hematocrit 45 0 %      MCV 92 fL      MCH 30 0 pg      MCHC 32 7 g/dL      RDW 13 5 %      MPV 9 8 fL      Platelets 121 Thousands/uL      nRBC 0 /100 WBCs      Neutrophils Relative 53 %      Immat GRANS % 0 %      Lymphocytes Relative 36 %      Monocytes Relative 9 %      Eosinophils Relative 2 %      Basophils Relative 0 %      Neutrophils Absolute 2 57 Thousands/µL      Immature Grans Absolute 0 01 Thousand/uL      Lymphocytes Absolute 1 75 Thousands/µL      Monocytes Absolute 0 44 Thousand/µL      Eosinophils Absolute 0 10 Thousand/µL      Basophils Absolute 0 02 Thousands/µL     UA w Reflex to Microscopic w Reflex to Culture [[id number]]     Lab Status: No result Specimen: Urine                  CTA head and neck with and without contrast   Final Result by Praful Mobley MD (01/23 9495)         1  No hemodynamically significant stenosis in the major arteries of the neck  2   No intracranial aneurysm or major intracranial arterial stenosis  Mild to moderate right greater than left atherosclerotic disease of the proximal portions of the middle cerebral arteries  3   No acute intracranial hemorrhage  Workstation performed: [id number]         CTA ED chest PE Study   Final Result by Florencio Velazquez DO (01/23 2955)      1  No evidence of central pulmonary embolus to the segmental level  Limited evaluation of the subsegmental vessels  2  1 8 x 1 3 cm wedge-shaped opacity posterior peripheral left lower lobe    Differential considerations include focal pneumonia (in the appropriate clinical setting), focal atelectasis with possibility of pulmonary infarct due to subsegmental embolus not    entirely excluded  Much less likely consideration would be a true mass  Follow-up CT chest in 2-3 months recommended to reevaluate this finding  The study was marked in Epic for follow-up  Workstation performed: [id number]                    Procedures  ECG 12 Lead Documentation Only    Date/Time: 1/23/2022 11:19 AM  Performed by: Jaja Jimenez PA-C  Authorized by: Jaja Jimenez PA-C     Indications / Diagnosis:  Syncopal Episode  Patient location:  ED  Previous ECG:     Previous ECG:  Unavailable  Interpretation:     Interpretation: normal    Rate:     ECG rate:  78    ECG rate assessment: normal    Rhythm:     Rhythm: sinus rhythm    Ectopy:     Ectopy: none    QRS:     QRS axis:  Normal  Conduction:     Conduction: normal    ST segments:     ST segments:  Normal  T waves:     T waves: normal    Comments:      Normal sinus rhythm with a rate of 78 without any acute ST segment changes or signs of ischemia  Normal axis  No ectopy             ED Course  ED Course as of 01/23/22 1939   Chely Alexander Jan 23, 2022   1250 Benadryl and Solu-Medrol given for pretreatment of contrast allergy  She does not have anaphylaxis to contrast, just a mild rash  No history of airway compromise with contrast  Will continue to monitor  SBIRT 22yo+      Most Recent Value   SBIRT (22 yo +)    In order to provide better care to our patients, we are screening all of our patients for alcohol and drug use  Would it be okay to ask you these screening questions? Yes Filed at: 01/23/2022 1141   Initial Alcohol Screen: US AUDIT-C     1  How often do you have a drink containing alcohol? 0 Filed at: 01/23/2022 1141   2  How many drinks containing alcohol do you have on a typical day you are drinking? 0 Filed at: 01/23/2022 1141   3a  Male UNDER 65: How often do you have five or more drinks on one occasion? 0 Filed at: 01/23/2022 1141   3b   FEMALE Any Age, or MALE 65+: How often do you have 4 or more drinks on one occassion? 0 Filed at: 01/23/2022 1141   Audit-C Score 0 Filed at: 01/23/2022 1141   ELSA: How many times in the past year have you    Used an illegal drug or used a prescription medication for non-medical reasons? Never Filed at: 01/23/2022 1141                    MDM  Number of Diagnoses or Management Options  Syncope: new and requires workup  Diagnosis management comments: This is a 67-year-old female presenting to the emergency department today status post syncopal episode  The patient fell backwards in a witnessed event and had an episode of left-sided arm shaking  She maintained consciousness throughout this  No bowel or bladder incontinence  No tongue biting  No prior seizure history  The patient presents to the emergency department today without any neurologic deficits  Vital signs are stable  Normal neurologic, cardiac, and pulmonary assessment  She does have chronic left lower extremity swelling that is no worse than it typically is  She has a history of PEs but not currently on anticoagulation  Lab workup reassuring  Patient had a documented history of allergy to IV contrast   I discussed with the patient that I believe that she needs a CT with contrast and she notes she only had a very mild rash the 1st time she got contrast   She has never had any airway compromise or other signs of anaphylaxis  The patient was pretreated with Solu-Medrol and Benadryl here in the emergency department  CTA head and neck without any acute ischemia or hemorrhage  CT PE study negative for any acute pulmonary emboli  EKG shows normal sinus rhythm with a rate of 78  Given the severity of the patient's syncope, decision was made to admit the patient for observation with telemetry to the service of Dr Denis Fernández  The patient verifies understanding and agrees to the plan at this time  All questions answered to the patient's satisfaction           Amount and/or Complexity of Data Reviewed  Clinical lab tests: ordered and reviewed  Tests in the radiology section of CPT®: ordered and reviewed  Discuss the patient with other providers: yes (Dr Amna Sandhu Attending)  Independent visualization of images, tracings, or specimens: yes (EKG)    Patient Progress  Patient progress: stable      Disposition  Final diagnoses:   Syncope     Time reflects when diagnosis was documented in both MDM as applicable and the Disposition within this note     Time User Action Codes Description Comment    1/23/2022  2:21 PM Edita Casas Add [R55] Syncope     1/23/2022  3:10 PM Esme Dee Add [R25 1] Episode of shaking       ED Disposition     ED Disposition Condition Date/Time Comment    Admit Stable Sun Jan 23, 2022 1421 Case was discussed with Dr Odalys Foote and the patient's admission status was agreed to be Admission Status: observation status to the service of Dr Odalys Foote          Follow-up Information    None         Current Discharge Medication List      CONTINUE these medications which have NOT CHANGED    Details   albuterol (PROVENTIL HFA,VENTOLIN HFA) 90 mcg/act inhaler Inhale 1 puff every 4 (four) hours as needed for wheezing or shortness of breath  Qty: 18 g, Refills: 5    Associated Diagnoses: Obstructive lung disease (HCC)      Ascorbic Acid (VITAMIN C) 500 MG CHEW Chew 500 mg      aspirin 81 mg chewable tablet Chew 81 mg daily      cholecalciferol (VITAMIN D3) 400 units tablet Take 400 Units by mouth daily        cyanocobalamin (VITAMIN B-12) 100 mcg tablet Take by mouth daily Pt unsure dose        Diclofenac Sodium (VOLTAREN) 1 %        diphenhydrAMINE (BENADRYL) 50 mg capsule Take 1 tablet (50 mg) by mouth one hour prior to contrast administration  Qty: 1 capsule, Refills: 0    Associated Diagnoses: Left arm swelling      levothyroxine (Synthroid) 75 mcg tablet Take 75 mcg by mouth daily      loratadine (CLARITIN) 10 mg tablet Take 10 mg by mouth 2 (two) times a day as needed for allergies      magnesium gluconate (MAGONATE) 500 MG tablet Take 500 mg by mouth 2 (two) times a day        methylPREDNISolone (MEDROL) 32 MG tablet Take 1 tablet by mouth 12 hours prior to contrast administration, and another tablet by mouth 2 hours prior to contrast administration  Qty: 2 tablet, Refills: 0    Associated Diagnoses: Left arm swelling      Omega-3 Fatty Acids (fish oil) 1,000 mg Take 1,000 mg by mouth daily Pt unsure dose        triamcinolone (KENALOG) 0 1 % cream APPLY A THIN LAYER TO AFFECTED AREA TWICE A DAY      vitamin E 100 UNIT capsule Take 100 Units by mouth daily Pt unsure               No discharge procedures on file      PDMP Review     None          ED Provider  Electronically Signed by           Diogo Alvarado PA-C  01/23/22 1953

## 2022-01-24 NOTE — TELEMEDICINE
TeleConsultation - Neurology   Denise Amor 77 y o  female MRN: [id number]  Unit/Bed#: -01 Encounter: [id number]        REQUIRED DOCUMENTATION:     1  This service was provided via Telemedicine  2  Provider located at [facility]  3  TeleMed provider: Dr Luis Joyce  4  Identify all parties in room with patient during tele consult:  Patient and friend  5  Patient was then informed that this was a Telemedicine visit and that the exam was being conducted confidentially over secure lines  My office door was closed  No one else was in the room  Patient acknowledged consent and understanding of privacy and security of the Telemedicine visit, and gave us permission to have the assistant stay in the room in order to assist with the history and to conduct the exam   I informed the patient that I have reviewed their record in Epic and presented the opportunity for them to ask any questions regarding the visit today  The patient agreed to participate  Assessment/Plan     * Syncope  Assessment & Plan  77year old female with diet-controlled DM, hypothyroidism, depression, DORIS, chronic DVT, history of PE who presented to the hospital after she had a witnessed episode of unresponsiveness with LUE shaking  Most likely convulsive syncope, in the setting of mild SMITA  Symptoms were prolonged due to the patient remaining upright  Plan:   Recommend obtaining echocardiogram and EEG, these can both be obtained as an outpatient  - supportive care  - no clear need for new outpatient neurologic follow-up at this time  - no additional recommendations   - okay to discharge from neurologic standpoint   -please call questions/concerns    Abnormal chest CT  Assessment & Plan  - 1 8 x 1 3 cm wedge-shaped opacity posterior peripheral left lower lobe noted     - Further work-up and management as per medicine team     Chronic deep vein thrombosis (DVT) of lower leg ([address] )  Assessment & Plan  - Follows with vascular surgery team as an outpatient  DORIS (obstructive sleep apnea)  Assessment & Plan  - Follows with sleep medicine team as an outpatient  - not compliant with CPAP due to not tolerating machine  Hypothyroidism  Assessment & Plan  Chronic condition, appears stable  -TSH on 01/23 was normal         Glory Del Toro will not need outpatient follow up with Neurology  She will require a routine EEG within 4 weeks  Will also need echocardiogram if not obtained during admission, similar time frame as above  History of Present Illness     Reason for Consult / Principal Problem: Syncope/Episode of shaking  Hx and PE limited by:  Not applicable  HPI: Glory Del Toro is a 77 y o  right handed female with diet-controlled DM, hypothyroidism, depression, DORIS, chronic DVT, history of PE who presented to the hospital after she was noted to have an episode of syncope  Patient follows with vascular surgery team for chronic DVT and is maintained on ASA 81 mg QD  She was seen by cardiology team at [facility] in September 2021 after she was noted to have possible MI on EKG  Per review of chart, patient woke up and felt lightheaded  She walked to her kitchen but while standing there, she reported feeling incredibly tired and lightheaded  She fell back on to the stove and her eyes were turned upward and she was starting at the ceiling  Her friend was with her and reportedly noted that her left arm was shaking  Her friend if she was okay, and she responded that something was wrong  Her friend then proceeded to place his arms around her and pulled her away from the stove  He kept her upright in his arms, for about 15-20 seconds while she was unresponsive, she then became tearful  He kept her upright  and shuffle towards the bed/couch where he laid her down with her head propped up  She slowly began coming around  Patient's friends report that this entire episode lasted less than a minute  It is unclear if she ever lost consciousness    She does report hearing her friend call out to her but he sounded very far away  She denies overt loss of vision but reports that she could not see him (?)  She denies any nausea/queasiness/clammy/sweaty sensation  She underwent CTA head and neck with and without contrast which demonstrated no hemodynamically significant stenosis in the major arteries of the neck, no intracranial aneurysm or major intracranial arterial stenosis, mild to moderate right greater then left atherosclerotic disease of the proximal portions of the middle cerebral arteries  Inpatient consult to Neurology  Consult performed by: Falguni Gamez PA-C  Consult ordered by: Geo Ramos MD           Review of Systems   Constitutional: Negative for chills and fever  HENT: Negative for facial swelling and hearing loss  Eyes: Negative for pain and discharge  Respiratory: Negative for cough, choking and shortness of breath  Cardiovascular: Negative for chest pain  Gastrointestinal: Negative for constipation, diarrhea, nausea and vomiting  Endocrine: Negative for cold intolerance and heat intolerance  Genitourinary: Negative for difficulty urinating, frequency and urgency  Skin: Negative for color change and rash  Neurological: Negative for dizziness, facial asymmetry, weakness, light-headedness, numbness and headaches  All other systems reviewed and are negative        Historical Information   Past Medical History:   Diagnosis Date    Arthritis     Borderline diabetes     diet controlled    Colon polyp     Depression     Disease of thyroid gland     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    DVT (deep venous thrombosis) (HCC)     hx- left leg    Pulmonary emboli (HCC)     during pregnancy    Rash     arms on occasion, cause unknown     Past Surgical History:   Procedure Laterality Date    COLONOSCOPY      DILATION AND CURETTAGE, DIAGNOSTIC / THERAPEUTIC      THYROIDECTOMY, PARTIAL      2020     Social History   Social History     Substance and Sexual Activity   Alcohol Use Yes    Comment: social rare     Social History     Substance and Sexual Activity   Drug Use Never     E-Cigarette/Vaping    E-Cigarette Use Never User      E-Cigarette/Vaping Substances    Nicotine No     THC No     CBD No     Flavoring No     Other No     Unknown No      Social History     Tobacco Use   Smoking Status Never Smoker   Smokeless Tobacco Never Used     Family History:   Family History   Problem Relation Age of Onset    No Known Problems Mother     Cancer Sister [de-identified]    No Known Problems Daughter     No Known Problems Sister     No Known Problems Maternal Aunt     No Known Problems Maternal Aunt     No Known Problems Paternal Aunt     No Known Problems Paternal Aunt     Breast cancer Other 59       Review of previous medical records was completed  Please see HPI  Meds/Allergies   Scheduled Meds:  Current Facility-Administered Medications   Medication Dose Route Frequency Provider Last Rate    acetaminophen  650 mg Oral Q6H PRN Dorean Lips, MD      aluminum-magnesium hydroxide-simethicone  30 mL Oral Q6H PRN Dorean Lips, MD      aspirin  81 mg Oral Daily Dorean Lips, MD      cyanocobalamin  500 mcg Oral Daily Dorean Lips, MD      enoxaparin  40 mg Subcutaneous Daily Dorean Lips, MD      levothyroxine  75 mcg Oral Early Morning Dorean Lips, MD      ondansetron  4 mg Intravenous Q6H PRN Dorean Lips, MD       Continuous Infusions:   PRN Meds:   acetaminophen    aluminum-magnesium hydroxide-simethicone    ondansetron      Allergies   Allergen Reactions    Crab (Diagnostic) - Food Allergy Itching and Rash    Iv Contrast [Iodinated Diagnostic Agents] Rash    Penicillins Rash       Objective   Vitals:Blood pressure 128/69, pulse 59, temperature 97 7 °F (36 5 °C), temperature source Tympanic, resp  rate 18, height 5' 4" (1 626 m), weight 71 9 kg (158 lb 8 2 oz), SpO2 96 %  ,Body mass index is 27 21 kg/m²  Intake/Output Summary (Last 24 hours) at 1/25/2022 1033  Last data filed at 1/25/2022 0800  Gross per 24 hour   Intake 400 ml   Output 1 ml   Net 399 ml       Invasive Devices: Invasive Devices  Report    Peripheral Intravenous Line            Peripheral IV 01/23/22 Left Antecubital 1 day                Physical Exam  Constitutional:       General: She is not in acute distress  Appearance: Normal appearance  She is normal weight  She is not ill-appearing, toxic-appearing or diaphoretic  HENT:      Head: Normocephalic  Right Ear: External ear normal       Left Ear: External ear normal    Eyes:      General: No scleral icterus  Right eye: No discharge  Left eye: No discharge  Extraocular Movements: Extraocular movements intact  Conjunctiva/sclera: Conjunctivae normal       Pupils: Pupils are equal, round, and reactive to light  Pulmonary:      Effort: Pulmonary effort is normal  No respiratory distress  Breath sounds: Normal breath sounds  No stridor  Skin:     Coloration: Skin is not jaundiced or pale  Neurological:      General: No focal deficit present  Mental Status: She is alert and oriented to person, place, and time  Cranial Nerves: No cranial nerve deficit  Sensory: No sensory deficit  Motor: No weakness  Coordination: Coordination normal    Psychiatric:         Mood and Affect: Mood normal          Behavior: Behavior normal          Thought Content: Thought content normal          Judgment: Judgment normal        Neurologic Exam     Mental Status   Oriented to person, place, and time  Awake alert oriented x3  Attention appears normal and intact  Language is fluent, comprehension appears to be intact  No gross evidence of aphasia  Patient is interacting appropriately  Cranial Nerves     CN III, IV, VI   Pupils are equal, round, and reactive to light    Pupils are equal and round  Extraocular muscles intact, gaze conjugate  Face appears symmetric with respect to motor  Tongue is midline  Shoulder shrug is symmetric  Motor Exam Patient moves all 4 extremities equally without drift  Bulk appears normal     Sensory Exam   Patient acknowledges sensation equally in all 4 extremities     Gait, Coordination, and Reflexes  no gross ataxia in any limb  Finger-to-nose was intact and symmetric  Gait was deferred       Lab Results:   Recent Results (from the past 24 hour(s))   Procalcitonin Reflex    Collection Time: 01/24/22 11:07 AM   Result Value Ref Range    Procalcitonin <0 05 <=0 25 ng/ml   D-dimer, quantitative    Collection Time: 01/24/22  6:01 PM   Result Value Ref Range    D-Dimer, Quant 0 55 (H) <0 50 ug/ml FEU       Imaging Studies: I have personally reviewed pertinent reports  and I have personally reviewed pertinent films in PACS  CTA head and neck with and without contrast- No hemodynamically significant stenosis in the major arteries of the neck  No intracranial aneurysm or major intracranial arterial stenosis  Mild to moderate right greater than left atherosclerotic disease of the proximal portions of the middle cerebral arteries  No acute intracranial hemorrhage      VTE Prophylaxis: Enoxaparin (Lovenox)

## 2022-01-24 NOTE — UTILIZATION REVIEW
Observation Admission Authorization Request   NOTIFICATION OF OBSERVATION ADMISSION/OBSERVATION AUTHORIZATION REQUEST   SERVICING FACILITY:   [place] [address]  [id number] [place] [address] [facility] [place], [address] [facility]  Tax ID: [id number]  NPI: [id number]  Place of Service: On [address] [facility] Code: 22  CPT Code for Observation: CPT   CPT 42830     ATTENDING PROVIDER:  Attending Name and NPI#: Kathe Flowers, [place] [[id number]]  Address: [address] [place] [address] [facility] [address] [place] [address]  Phone:  [phone]     UTILIZATION REVIEW CONTACT:  [address], Utilization Review Supervisor  Network Utilization Review Department  Phone: [phone]  Fax: [phone]  Email: [email or web address]     PHYSICIAN ADVISORY SERVICES:  FOR AAOX-FP-NAT[place] REVIEW - MEDICAL NECESSITY DENIAL  Phone: [phone]  Fax: [phone]  Email: [email or web address]     TYPE OF REQUEST:  Observation Status     ADMISSION INFORMATION:  ADMISSION DATE/TIME:   PATIENT DIAGNOSIS CODE/DESCRIPTION:  Syncope [R55]  Episode of shaking [R25 1]  DISCHARGE DATE/TIME: No discharge date for patient encounter  DISCHARGE DISPOSITION (IF DISCHARGED): Final discharge disposition not confirmed     IMPORTANT INFORMATION:  Please contact the Roberta Grant directly with any questions or concerns regarding this request  Department voicemails are confidential     Send requests for admission clinical reviews, concurrent reviews, approvals, and administrative denials due to lack of clinical to fax [phone]

## 2022-01-24 NOTE — ASSESSMENT & PLAN NOTE
77year old female with diet-controlled DM, hypothyroidism, depression, DORIS, chronic DVT, history of PE who presented to the hospital after she had a witnessed episode of unresponsiveness with LUE shaking  Most likely convulsive syncope, in the setting of mild SMITA  Symptoms were prolonged due to the patient remaining upright  Plan:   Recommend obtaining echocardiogram and EEG, these can both be obtained as an outpatient  - supportive care  - no clear need for new outpatient neurologic follow-up at this time    - no additional recommendations   - okay to discharge from neurologic standpoint   -please call questions/concerns

## 2022-01-24 NOTE — ASSESSMENT & PLAN NOTE
- Follows with sleep medicine team as an outpatient  - not compliant with CPAP due to not tolerating machine

## 2022-01-24 NOTE — PLAN OF CARE
Problem: PAIN - ADULT  Goal: Verbalizes/displays adequate comfort level or baseline comfort level  Description: Interventions:  - Encourage patient to monitor pain and request assistance  - Assess pain using appropriate pain scale  - Administer analgesics based on type and severity of pain and evaluate response  - Implement non-pharmacological measures as appropriate and evaluate response  - Consider cultural and social influences on pain and pain management  - Notify physician/advanced practitioner if interventions unsuccessful or patient reports new pain  Outcome: Progressing     Problem: INFECTION - ADULT  Goal: Absence or prevention of progression during hospitalization  Description: INTERVENTIONS:  - Assess and monitor for signs and symptoms of infection  - Monitor lab/diagnostic results  - Monitor all insertion sites, i e  indwelling lines, tubes, and drains  - Monitor endotracheal if appropriate and nasal secretions for changes in amount and color  - [place] appropriate cooling/warming therapies per order  - Administer medications as ordered  - Instruct and encourage patient and family to use good hand hygiene technique  - Identify and instruct in appropriate isolation precautions for identified infection/condition  Outcome: Progressing     Problem: DISCHARGE PLANNING  Goal: Discharge to home or other facility with appropriate resources  Description: INTERVENTIONS:  - Identify barriers to discharge w/patient and caregiver  - Arrange for needed discharge resources and transportation as appropriate  - Identify discharge learning needs (meds, wound care, etc )  - Arrange for interpretive services to assist at discharge as needed  - Refer to Case Management Department for coordinating discharge planning if the patient needs post-hospital services based on physician/advanced practitioner order or complex needs related to functional status, cognitive ability, or social support system  Outcome: Progressing Problem: CARDIOVASCULAR - ADULT  Goal: Maintains optimal cardiac output and hemodynamic stability  Description: INTERVENTIONS:  - Monitor I/O, vital signs and rhythm  - Monitor for S/S and trends of decreased cardiac output  - Administer and titrate ordered vasoactive medications to optimize hemodynamic stability  - Assess quality of pulses, skin color and temperature  - Assess for signs of decreased coronary artery perfusion  - Instruct patient to report change in severity of symptoms  Outcome: Progressing     Problem: NEUROSENSORY - ADULT  Goal: Achieves maximal functionality and self care  Description: INTERVENTIONS  - Monitor swallowing and airway patency with patient fatigue and changes in neurological status  - Encourage and assist patient to increase activity and self care     - Encourage visually impaired, hearing impaired and aphasic patients to use assistive/communication devices  Outcome: Progressing

## 2022-01-24 NOTE — DISCHARGE SUMMARY
Tyshawn U  67   Discharge- Haleigh Leyva 1945, 77 y o  female MRN: [id number]  Unit/Bed#: -01 Encounter: [id number]  Primary Care Provider: Brian Nolen MD   Date and time admitted to hospital: 1/23/2022 10:58 AM    Chronic deep vein thrombosis (DVT) of lower leg [facility])  Assessment & Plan  · Known history not currently on [facility] had DVT after 2nd child and then 10 years later had DVT w/PE treated with coumadin for 3 months  Patient endorsed at negative coagulopathy studies with hematology years ago and no longer follows with them  · Recent vascular work up due LUE swelling negative duplex study for DVT 1/19/2022 and negative duplex study of LLE for DVT or RLE  · CT of the chest was reviewed with pulmonologist on-call and recommended repeat CT of the chest in 8 weeks  Left lower lobe findings could be atelectasis, less likely pneumonia, does not look like a true infarct, hesitant to anticoagulate without any true venous thromboembolism  Venous Doppler of the lower extremities shows no evidence of DVT  Will check D-dimer  This was also discussed with patient's PCP Dr MORALES [facility]      Obstructive lung disease [facility])  Assessment & Plan  · Known history    DORIS (obstructive sleep apnea)  Assessment & Plan  · Patient with known moderate sleep apnea  · Seen last be her sleep specialist 12/22/21 due to inability to find a CPAP mask that she can tolerate  · Patient made aware given her obstructive history wearing her cpap is a must and recommend she be fitting for mandibular advancement device  · Would recommend patient's own machine if possible if not will work cpap qhs while inpatient       Hypothyroidism  Assessment & Plan  · Known history  · TSH normal   · Continue synthroid    * Syncope  Assessment & Plan  · POA with syncopal event versus seizure   · CT head and Neck---no hemodynamically significant stenosis   · CTA PE study---no evidence of central pulmonary embolus to the segmental   Limited evaluation noted  1 8 x 1 3 cm which shaped opacity in the posterior peripheral left lower lobe noted, no clinical signs of pneumonia  · Prolactin level pending  · Telemetry monitoring  · Check orhtostatic BP  · Will get Echocardiogram   · Out of bed with assistance   · Likely syncope secondary to convulsive seizure  No antiepileptic drugs indicated at this time  Will recommend ambulatory EEG  Neurology input appreciated  · 2D echocardiogram awaited  Medical Problems             Resolved Problems  Date Reviewed: 1/24/2022    None                Admission Date:   Admission Orders (From admission, onward)     Ordered        01/23/22 1421  Place in Observation  Once                        Admitting Diagnosis: Syncope [R55]  Episode of shaking [R25 1]    HPI:   Dexter Hammans is a 77 y o  female with a PMH of hypothyroidism, COPD, DORIS, history of DVT presents with syncope  And patient felt patient woke up this morning and felt lightheaded and she went to the kitchen but while standing the she fell back on the stove in kitchen and the eyes were rolled up and staring the roof  Patient friend who was there by her side was able to hold her  and he noticed that her left arm was shaking and patient was not responding  The episode lasted almost like 3 minutes ,no fall noted  Patient was held steady and walked to the bedroom and she was laid on her bed and she looked pale and staring to the side  Patient does not remember the episode  The patient denies of any headache or blurry vision  Patient denies any fever chills or cough  Patient denies of any nausea vomiting or bladder or bowel incontinence  Procedures Performed:   Orders Placed This Encounter   Procedures    ED ECG Documentation Only       Summary of Hospital Course:     Patient admitted with syncope with likely seizure episode  Patient EKG shows sinus rhythm and no tele events noted  Orthostatic vitals negative    Patient was evaluated by Neurology and recommended likely convulsive episode and no antiepileptic medication at this time  But recommend outpatient EEG and follow up with Neurology  2D echocardiogram is awaited  Patient also had a CT of the chest which showed question of pulmonary infarct however this was reviewed with pulmonologist Dr Dionisio Pizarro and recommended that she needs repeat CT of the chest in 8 weeks and likely atelectasis less likely pneumonia  Does not look like a true infarct  Venous Doppler of the lower extremities negative for DVT  Would be hesitant to anticoagulate without a true venous thromboembolism  Will follow-up on D-dimer  HEENT-PERRLA, moist oral mucosa  Neck-supple, no JVD elevation   Respiratory-equal air entry bilaterally, no rales or rhonchi  Cardiovascular system-S1, S2 heard, no murmur or gallops or rubs  Abdomen-soft, nontender, no guarding or rigidity, bowel sounds heard  Extremities-no pedal edema  Peripheral pulses palpable  Musculoskeletal-no contractures  Central nervous system-no acute focal neurological deficit ,no sensory or motor deficit noted  Skin-no rash noted      Significant Findings, Care, Treatment and Services Provided: ct head, echo , neurology , tele    Complications: nil    Condition at Discharge: good         Discharge instructions/Information to patient and family:   See after visit summary for information provided to patient and family  Provisions for Follow-Up Care:  See after visit summary for information related to follow-up care and any pertinent home health orders  PCP: Figueroa Lima MD    Disposition: Home    Planned Readmission: No    Discharge Statement   I spent 35 minutes discharging the patient  This time was spent on the day of discharge  I had direct contact with the patient on the day of discharge  Additional documentation is required if more than 30 minutes were spent on discharge       Discharge Medications:  See after visit summary for reconciled discharge medications provided to patient and family

## 2022-01-24 NOTE — ASSESSMENT & PLAN NOTE
· Known history not currently on [facility] had DVT after 2nd child and then 10 years later had DVT w/PE treated with coumadin for 3 months  Patient endorsed at negative coagulopathy studies with hematology years ago and no longer follows with them  · Recent vascular work up due LUE swelling negative duplex study for DVT 1/19/2022 and negative duplex study of LLE for DVT or RLE

## 2022-01-24 NOTE — ASSESSMENT & PLAN NOTE
· Known history not currently on [facility] had DVT after 2nd child and then 10 years later had DVT w/PE treated with coumadin for 3 months  Patient endorsed at negative coagulopathy studies with hematology years ago and no longer follows with them  · Recent vascular work up due LUE swelling negative duplex study for DVT 1/19/2022 and negative duplex study of LLE for DVT or RLE  · CT of the chest was reviewed with pulmonologist on-call and recommended repeat CT of the chest in 8 weeks  Left lower lobe findings could be atelectasis, less likely pneumonia, does not look like a true infarct, hesitant to anticoagulate without any true venous thromboembolism  Venous Doppler of the lower extremities shows no evidence of DVT  Will check D-dimer    This was also discussed with patient's PCP Dr Young Getting

## 2022-01-24 NOTE — UTILIZATION REVIEW
Initial Clinical Review    Admission Orders (From admission, onward)     Ordered        01/24/22 1856  Inpatient Admission  Once            01/23/22 1421  Place in Observation  Once                      1/25   CHANGED to INPATIENT  STATUS  For continued evaluation of neuro status & renal indices requiring > 48 hr hospitalization         Inpatient Admission  Once        Transfer Service: Hospitalist       Question Answer   Level of Care Med Surg   Estimated length of stay More than 2 Midnights   Certification I certify that inpatient services are medically necessary for this patient for a duration of greater than two midnights  See H&P and MD Progress Notes for additional information about the patient's course of treatment  ED Arrival Information     Expected Arrival Acuity    - 1/23/2022 10:54 Emergent         Means of arrival Escorted by Service Admission type    [facility] Emergency         Arrival complaint    pt states seizure or stroke        Chief Complaint   Patient presents with    Syncope     Boyfriend witnessed pt looking up and went backwards, left arms shaking around 10 am  Patient would answer boyfriends questions  Patient reports she does not recall incident  Per boyfriend pt did not hit head       Initial Presentation: 18-year-old female,  To ER from home,  Admitted OBS status (changed to IP status)   MS level of care for  Workup of  Syncopal vs seizure episode  PMH of hypothyroidism, COPD, DORIS,  DVT (no current anticoagulopathy)  Boyfriend reports pt woke up feeling lightheaded this am:  While standing in kitchen, pt fell back onto stove, eyes rolled up,  Staring at ceiling  Bf noted her LUE shaking, pt not responsive for approx 3 min  Pt was held upright, no fall  No recall of episode  No h/o sz  IN ER,  Alert, oriented  CT head and Neck w/no significant stenosis  CTA PE study - neg for PE   BUN elevated      Will cont telemetry, ck orthostatic bps, obtain ECHO, Consult Neurology, trend renal indices         Date:  1/24    Day 2:   Tele remains NSR;  No further seizure activity since admit  A/O - no focal deficits  NEUROLOGY CONSULT:   Most likely convulsive syncope, in the setting of mild SMITA  Symptoms were prolonged due to the patient remaining upright  Recommend obtaining echocardiogram and EEG (can be outpatient)    No AED's at this time  1/25  Remains stable w/no further seizure activity  BUN stabilized  ED Triage Vitals   Temperature Pulse Respirations Blood Pressure SpO2   01/23/22 1102 01/23/22 1103 01/23/22 1103 01/23/22 1103 01/23/22 1103   97 6 °F (36 4 °C) 87 18 156/81 100 %      Temp Source Heart Rate Source Patient Position - Orthostatic VS BP Location FiO2 (%)   01/23/22 1102 01/23/22 1103 01/23/22 1245 01/23/22 1103 --   Oral Monitor Lying Right arm       Pain Score       01/23/22 1103       No Pain          Wt Readings from Last 1 Encounters:   01/25/22 71 7 kg (158 lb)     Additional Vital Signs:   01/23/22 1745 98 1 °F (36 7 °C) 94 16 138/80 96 % -- Lying   01/23/22 1534 -- 96 17 138/78 96 % None (Room air) Lying   01/23/22 1245 -- 88 17 154/80 100 % --      01/24/22 0007 -- 92 -- 133/96 -- -- Standing - Orthostatic VS   01/24/22 0006 -- 85 -- 142/84 -- -- Sitting - Orthostatic VS   01/24/22 0005 -- 78 -- 129/69 -- -- Lying - Orthostatic VS     01/25/22 0717 97 7 °F (36 5 °C) 59 18 128/69         Pertinent Labs/Diagnostic Test Results:     CTA ED chest PE Study   Final Result by Iraj Melendez DO (01/23 1405)       1  No evidence of central pulmonary embolus to the segmental level  Limited evaluation of the subsegmental vessels        2  1 8 x 1 3 cm wedge-shaped opacity posterior peripheral left lower lobe  Differential considerations include focal pneumonia (in the appropriate clinical setting), focal atelectasis with possibility of pulmonary infarct due to subsegmental embolus not    entirely excluded    Much less likely consideration would be a true mass  Follow-up CT chest in 2-3 months recommended to reevaluate this finding        The study was marked in Epic for follow-up        Workstation performed: [id number]                 EKG and Other Studies Reviewed on Admission:   · EKG: NSR   HR rate is controlled , no acute st-t changes      Results from last 7 days   Lab Units 01/23/22  1137   SARS-COV-2  Negative     Results from last 7 days   Lab Units 01/24/22  0500 01/23/22  1137   WBC Thousand/uL 11 32* 4 89   HEMOGLOBIN g/dL 13 9 14 7   HEMATOCRIT % 42 8 45 0   PLATELETS Thousands/uL 202 190   NEUTROS ABS Thousands/µL  --  2 57         Results from last 7 days   Lab Units 01/24/22  0500 01/23/22  1137   SODIUM mmol/L 140 139   POTASSIUM mmol/L 4 2 4 6   CHLORIDE mmol/L 107 103   CO2 mmol/L 27 29   ANION GAP mmol/L 6 7   BUN mg/dL 18 24*   CREATININE mg/dL 0 72 0 88   EGFR ml/min/1 73sq m 82 64   CALCIUM mg/dL 9 3 9 7   MAGNESIUM mg/dL  --  2 0     Results from last 7 days   Lab Units 01/23/22  1137   AST U/L 19   ALT U/L 15   ALK PHOS U/L 56 1   TOTAL PROTEIN g/dL 7 9   ALBUMIN g/dL 4 4   TOTAL BILIRUBIN mg/dL 0 61         Results from last 7 days   Lab Units 01/24/22  0500 01/23/22  1137   GLUCOSE RANDOM mg/dL 107 97     Results from last 7 days   Lab Units 01/23/22  1137   HS TNI 0HR ng/L 3     Results from last 7 days   Lab Units 01/24/22  1801   D-DIMER QUANTITATIVE ug/ml FEU 0 55*         Results from last 7 days   Lab Units 01/23/22  1137   TSH 3RD GENERATON uIU/mL 1 242     Results from last 7 days   Lab Units 01/24/22  1107 01/23/22  1137   PROCALCITONIN ng/ml <0 05 <0 05         Results from last 7 days   Lab Units 01/23/22  1137   PROLACTIN ng/mL 7 2         Results from last 7 days   Lab Units 01/23/22  1137   BNP pg/mL 25 2         Results from last 7 days   Lab Units 01/23/22  1137   INFLUENZA A PCR  Negative   INFLUENZA B PCR  Negative   RSV PCR  Negative         ED Treatment:   Medication Administration from 01/23/2022 1054 to 01/23/2022 1738       Date/Time Order Dose Route Action     01/23/2022 1146 lactated ringers bolus 1,000 mL 1,000 mL Intravenous New Bag     01/23/2022 1244 diphenhydrAMINE (BENADRYL) injection 50 mg 50 mg Intravenous Given     01/23/2022 1243 methylPREDNISolone sodium succinate (Solu-MEDROL) injection 125 mg 125 mg Intravenous Given     01/23/2022 1538 cyanocobalamin (VITAMIN B-12) tablet 500 mcg 500 mcg Oral Given     01/23/2022 1539 enoxaparin (LOVENOX) subcutaneous injection 40 mg 40 mg Subcutaneous Given        Past Medical History:   Diagnosis Date    Arthritis     Borderline diabetes     diet controlled    Colon polyp     Depression     Disease of thyroid gland     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    DVT (deep venous thrombosis) (HCC)     hx- left leg    Pulmonary emboli (HCC)     during pregnancy    Rash     arms on occasion, cause unknown     Present on Admission:   Hypothyroidism   DORIS (obstructive sleep apnea)   Chronic deep vein thrombosis (DVT) of lower leg (HCC)   Obstructive lung disease (HCC)      Admitting Diagnosis: Syncope [R55]  Episode of shaking [R25 1]  Age/Sex: 77 y o  female  Admission Orders:   Telemetry, scd's,  Orthostatic vs ,  Echo,  Serial trops,  Finger foods, neuro ck q shift , neuro consult     Scheduled Medications:  aspirin, 81 mg, Oral, Daily  cyanocobalamin, 500 mcg, Oral, Daily  enoxaparin, 40 mg, Subcutaneous, Daily  levothyroxine, 75 mcg, Oral, Early Morning      Continuous IV Infusions:     PRN Meds:  acetaminophen, 650 mg, Oral, Q6H PRN  aluminum-magnesium hydroxide-simethicone, 30 mL, Oral, Q6H PRN  ondansetron, 4 mg, Intravenous, Q6H PRN        Network Utilization Review Department  ATTENTION: Please call with any questions or concerns to [phone] and carefully listen to the prompts so that you are directed to the right person   All voicemails are confidential   Denver Ozuna all requests for admission clinical reviews, approved or denied determinations and any other requests to dedicated fax number below belonging to the campus where the patient is receiving treatment   List of dedicated fax numbers for the Facilities:  [address] DENIALS (Administrative/Medical Necessity) [phone]   [address] (Maternity/NICU/Pediatrics) [phone]   [address] [place] [address]  [address] [address] [place] [address] [address] [facility] [place] [address] [phone]

## 2022-01-25 ENCOUNTER — APPOINTMENT (INPATIENT)
Dept: NON INVASIVE DIAGNOSTICS | Facility: HOSPITAL | Age: 77
DRG: 101 | End: 2022-01-25
Payer: COMMERCIAL

## 2022-01-25 VITALS
TEMPERATURE: 97.7 F | WEIGHT: 158 LBS | DIASTOLIC BLOOD PRESSURE: 60 MMHG | HEIGHT: 64 IN | BODY MASS INDEX: 26.98 KG/M2 | SYSTOLIC BLOOD PRESSURE: 130 MMHG | RESPIRATION RATE: 18 BRPM | OXYGEN SATURATION: 96 % | HEART RATE: 59 BPM

## 2022-01-25 LAB
AORTIC ROOT: 2.7 CM
APICAL FOUR CHAMBER EJECTION FRACTION: 68 %
E WAVE DECELERATION TIME: 231 MS
FRACTIONAL SHORTENING: 39 % (ref 28–44)
INTERVENTRICULAR SEPTUM IN DIASTOLE (PARASTERNAL SHORT AXIS VIEW): 0.7 CM (ref 0.51–0.96)
LAAS-AP4: 18.8 CM2
LEFT ATRIUM SIZE: 2.9 CM
LEFT INTERNAL DIMENSION IN SYSTOLE: 2.7 CM (ref 2.1–4)
LEFT VENTRICULAR INTERNAL DIMENSION IN DIASTOLE: 4.4 CM (ref 4.13–6.15)
LEFT VENTRICULAR POSTERIOR WALL IN END DIASTOLE: 0.8 CM (ref 0.5–0.95)
LEFT VENTRICULAR STROKE VOLUME: 61 ML
MV E'TISSUE VEL-LAT: 10 CM/S
MV PEAK A VEL: 0.81 M/S
MV PEAK E VEL: 81 CM/S
RIGHT ATRIAL 2D VOLUME: 43 ML
RIGHT ATRIUM AREA SYSTOLE A4C: 16 CM2
RIGHT VENTRICLE ID DIMENSION: 3.9 CM
SL CV LV EF: 60
SL CV PED ECHO LEFT VENTRICLE DIASTOLIC VOLUME (MOD BIPLANE) 2D: 87 ML
SL CV PED ECHO LEFT VENTRICLE SYSTOLIC VOLUME (MOD BIPLANE) 2D: 26 ML
TR MAX PG: 25 MMHG
TRICUSPID VALVE PEAK REGURGITATION VELOCITY: 2.52 M/S
Z-SCORE OF INTERVENTRICULAR SEPTUM IN END DIASTOLE: -0.32
Z-SCORE OF LEFT VENTRICULAR DIMENSION IN END SYSTOLE: -1.36
Z-SCORE OF LEFT VENTRICULAR POSTERIOR WALL IN END DIASTOLE: 0.66

## 2022-01-25 PROCEDURE — 93306 TTE W/DOPPLER COMPLETE: CPT

## 2022-01-25 PROCEDURE — 99232 SBSQ HOSP IP/OBS MODERATE 35: CPT | Performed by: INTERNAL MEDICINE

## 2022-01-25 PROCEDURE — 93306 TTE W/DOPPLER COMPLETE: CPT | Performed by: INTERNAL MEDICINE

## 2022-01-25 RX ADMIN — ASPIRIN 81 MG: 81 TABLET, CHEWABLE ORAL at 09:12

## 2022-01-25 RX ADMIN — ENOXAPARIN SODIUM 40 MG: 100 INJECTION SUBCUTANEOUS at 09:12

## 2022-01-25 RX ADMIN — LEVOTHYROXINE SODIUM 75 MCG: 25 TABLET ORAL at 05:53

## 2022-01-25 RX ADMIN — CYANOCOBALAMIN TAB 500 MCG 500 MCG: 500 TAB at 09:12

## 2022-01-25 NOTE — UTILIZATION REVIEW
Inpatient Admission Authorization Request   NOTIFICATION OF INPATIENT ADMISSION/INPATIENT AUTHORIZATION REQUEST   SERVICING FACILITY:   [place] [address]  [id number] [place] [address] [facility] [place], [address] [facility]  Tax ID: [id number]  NPI: [id number]  Place of Service: Inpatient 4[address] [facility]  60[address]  Place of Service Code: 24     ATTENDING PROVIDER:  Attending Name and NPI#: Remi Briscoe [[id number]]  Address: [address] [place] [address] [facility], [address] [place] [address]  Phone:  [phone]     UTILIZATION REVIEW CONTACT:  Sabi Diaz Utilization Review Supervisor  Network Utilization Review Department  Phone: [phone]  Fax: [phone]  Email: [email or web address]     PHYSICIAN ADVISORY SERVICES:  FOR [id number] REVIEW - MEDICAL NECESSITY DENIAL  Phone: [phone]  Fax: [phone]  Email: [email or web address]     TYPE OF REQUEST:  Inpatient Status     ADMISSION INFORMATION:  ADMISSION DATE/TIME: 1/24/22  6:56 PM  PATIENT DIAGNOSIS CODE/DESCRIPTION:  Syncope [R55]  Episode of shaking [R25 1]  DISCHARGE DATE/TIME: No discharge date for patient encounter  DISCHARGE DISPOSITION (IF DISCHARGED): Final discharge disposition not confirmed     IMPORTANT INFORMATION:  Please contact the Roberta Grant directly with any questions or concerns regarding this request  Department voicemails are confidential     Send requests for admission clinical reviews, concurrent reviews, approvals, and administrative denials due to lack of clinical to fax [phone]

## 2022-01-25 NOTE — PROGRESS NOTES
Progress Note - Denise Amor 77 y o  female MRN: [id number]    Unit/Bed#: -01 Encounter: [id number]      Assessment:  Syncope-unclear etiology, seen by Neurology, suspected secondary to convulsive seizure, did not recommend any further anti epileptic therapy  Recommended outpatient EEG  Echocardiogram showed normal EF and normal function  Chronic deep vein thrombosis of the lower leg, repeat venous Doppler negative for DVT  Abnormal CT chest recommended outpatient follow-up with CT chest in 8 weeks, this was conveyed to PCP Dr Valdez Doing by Dr Lennox Plaza per notes  COPD no exacerbation    DORIS  Hypothyroid    Patient was scheduled for discharge to home yesterday however she could not leave as her echocardiogram was not done, this morning she has completed the echocardiogram and is being discharged home  Subjective:   Patient denies any headache, dizziness, chest pain, shortness of breath  Objective:     Vitals: Blood pressure 130/60, pulse 59, temperature 97 7 °F (36 5 °C), temperature source Tympanic, resp  rate 18, height 5' 4" (1 626 m), weight 71 7 kg (158 lb), SpO2 96 %  ,Body mass index is 27 12 kg/m²  Intake/Output Summary (Last 24 hours) at 1/25/2022 1446  Last data filed at 1/25/2022 0800  Gross per 24 hour   Intake 400 ml   Output 1 ml   Net 399 ml       Physical Exam:  General appearance:  Patient not in acute distress  Eyes:  Pupils equal reacting to light  ENT:  Moist oral mucous membranes  CVS:  S1-S2 heard, regular rate and rhythm, no pedal edema  Chest:  Bilateral air entry present, clear to auscultation  Abdomen:  Soft, nontender, bowel sounds present  CNS:  No focal neurological deficits  Genitourinary: deferred  Skin:  No acute rash   psychiatric:  No psychosis  Musculoskeletal:  No joint deformities     Invasive Devices  Report    None                 Lab, Imaging and other studies: I have personally reviewed pertinent reports      VTE Pharmacologic Prophylaxis: Enoxaparin (Lovenox)  VTE Mechanical Prophylaxis: sequential compression device     Patient is being discharged to home today    Please refer to the discharge summary performed on 01/24/2022 for further details regarding discharge

## 2022-01-25 NOTE — CASE MANAGEMENT
Case Management Discharge Planning Note    Patient name Faizan Mtz  Location /-89 MRN [id number]  : 1945 Date 2022       Current Admission Date: 2022  Current Admission Diagnosis:Syncope   Patient Active Problem List    Diagnosis Date Noted    Abnormal chest CT 2022    Syncope 2022    Chronic deep vein thrombosis (DVT) of lower leg ([facility] ) 2022    Left arm swelling 2022    Obstructive lung disease ([facility] ) 2021    DORIS (obstructive sleep apnea)     Sensorineural hearing loss (SNHL) of both ears 2021    Mixed conductive and sensorineural hearing loss of left ear with restricted hearing of right ear 2021    Globus sensation 2021    Other dysphagia 2021    Postoperative hypothyroidism 03/15/2021    Other pulmonary embolism without acute cor pulmonale ([facility] ) 03/15/2021    Hypothyroidism 01/15/2021    Dyspepsia 2019    History of colon polyps 2019    History of Helicobacter pylori infection 2019      LOS (days): 1  Geometric Mean LOS (GMLOS) (days): 2 60  Days to GMLOS:1 9     OBJECTIVE:  Risk of Unplanned Readmission Score: 9         Current admission status: Inpatient   Preferred Pharmacy:   [email or web address] [place] [address]  Phone: [phone] Fax: [phone] 4918 [facility] [address]  Phone: [phone] Fax: [phone]    Primary Care Provider: Manolo Ibrahim MD    Primary Insurance: Zhane Larson [facility]  Secondary Insurance: [facility]    DISCHARGE DETAILS:    Discharge planning discussed with[de-identified] Patient  Freedom of Choice: Yes     CM contacted family/caregiver?: No- see comments (DECLINED)  Were Treatment Team discharge recommendations reviewed with patient/caregiver?: Yes  Did patient/caregiver verbalize understanding of patient care needs?: Yes  Were patient/caregiver advised of the risks associated with not following Treatment Team discharge recommendations?: Yes    [facility]         Is the patient interested in [place] [address] at discharge?: No    DME Referral Provided  Referral made for DME?: No    Would you like to participate in our [address] service program?  : No - Declined    Treatment Team Recommendation: Home  Discharge Destination Plan[de-identified] Home  Transport at Discharge : Auto with designated ,Family     IMM Given (Date):: 01/25/22  IMM Given to[de-identified] Patient (AGREES WITH D/C DETERMINATION  )

## 2022-01-25 NOTE — DISCHARGE INSTRUCTIONS
Syncope   WHAT YOU NEED TO KNOW:   Syncope is also called fainting or passing out  Syncope is a sudden, temporary loss of consciousness, followed by a fall from a standing or sitting position  Syncope ranges from not serious to a sign of a more serious condition that needs to be treated  You can control some health conditions that cause syncope  Your healthcare providers can help you create a plan to manage syncope and prevent episodes  DISCHARGE INSTRUCTIONS:   Seek care immediately if:   · You are bleeding because you hit your head when you fainted  · You suddenly have double vision, difficulty speaking, numbness, and cannot move your arms or legs  · You have chest pain and trouble breathing  · You vomit blood or material that looks like coffee grounds  · You see blood in your bowel movement  Contact your healthcare provider if:   · You have new or worsening symptoms  · You have another syncope episode  · You have a headache, fast heartbeat, or feel too dizzy to stand up  · You have questions or concerns about your condition or care  Medicines:   · Medicines  may be needed to help your heart pump strongly and regularly  Your healthcare provider may also make changes to any medicines that are causing syncope  · Take your medicine as directed  Contact your healthcare provider if you think your medicine is not helping or if you have side effects  Tell him or her if you are allergic to any medicine  Keep a list of the medicines, vitamins, and herbs you take  Include the amounts, and when and why you take them  Bring the list or the pill bottles to follow-up visits  Carry your medicine list with you in case of an emergency  Follow up with your doctor as directed:  Write down your questions so you remember to ask them during your visits  Manage syncope:   · Keep a record of your syncope episodes  Include your symptoms and your activity before and after the episode   The record can help your healthcare provider find the cause of your syncope and help you manage episodes  · Sit or lie down when needed  This includes when you feel dizzy, your throat is getting tight, and your vision changes  Raise your legs above the level of your heart  · Take slow, deep breaths if you start to breathe faster with anxiety or fear  This can help decrease dizziness and the feeling that you might faint  · Check your blood pressure often  This is important if you take medicine to lower your blood pressure  Check your blood pressure when you are lying down and when you are standing  Ask how often to check during the day  Keep a record of your blood pressure numbers  Your healthcare provider may use the record to help plan your treatment  Prevent a syncope episode:   · Move slowly and let yourself get used to one position before you move to another position  This is very important when you change from a lying or sitting position to a standing position  Take some deep breaths before you stand up from a lying position  Stand up slowly  Sudden movements may cause a fainting spell  Sit on the side of the bed or couch for a few minutes before you stand up  If you are on bedrest, try to be upright for about 2 hours each day, or as directed  Do not lock your legs if you are standing for a long period of time  Move your legs and bend your knees to keep blood flowing  · Follow your healthcare provider's recommendations  Your provider may  recommend that you drink more liquids to prevent dehydration  You may also need to have more salt to keep your blood pressure from dropping too low and causing syncope  Your provider will tell you how much liquid and sodium to have each day  He or she will also tell you how much physical activity is safe for you  This will depend on what is causing your syncope  · Watch for signs of low blood sugar    These include hunger, nervousness, sweating, and fast or fluttery heartbeats  Talk with your healthcare provider about ways to keep your blood sugar level steady  · Do not strain if you are constipated  You may faint if you strain to have a bowel movement  Walking is the best way to get your bowels moving  Eat foods high in fiber to make it easier to have a bowel movement  Good examples are high-fiber cereals, beans, vegetables, and whole-grain breads  Prune juice may help make bowel movements softer  · Be careful in hot weather  Heat can cause a syncope episode  Limit activity done outside on hot days  Physical activity in hot weather can lead to dehydration  This can cause an episode  © Copyright [employer] 2021 Information is for End User's use only and may not be sold, redistributed or otherwise used for commercial purposes  All illustrations and images included in CareNotes® are the copyrighted property of [employer]  or Zeynep Darby  The above information is an  only  It is not intended as medical advice for individual conditions or treatments  Talk to your doctor, nurse or pharmacist before following any medical regimen to see if it is safe and effective for you

## 2022-01-25 NOTE — PLAN OF CARE
Problem: SAFETY ADULT  Goal: Patient will remain free of falls  Description: INTERVENTIONS:  - Educate patient/family on patient safety including physical limitations  - Instruct patient to call for assistance with activity   - Consult OT/PT to assist with strengthening/mobility   - Keep Call bell within reach  - Keep bed low and locked with side rails adjusted as appropriate  - Keep care items and personal belongings within reach  - Initiate and maintain comfort rounds  - Make Fall Risk Sign visible to staff  - Offer Toileting every 4 Hours, in advance of need  - Initiate/Maintain bedalarm  - Obtain necessary fall risk management equipment:   - Apply yellow socks and bracelet for high fall risk patients  - Consider moving patient to room near nurses station  Outcome: Progressing     Problem: DISCHARGE PLANNING  Goal: Discharge to home or other facility with appropriate resources  Description: INTERVENTIONS:  - Identify barriers to discharge w/patient and caregiver  - Arrange for needed discharge resources and transportation as appropriate  - Identify discharge learning needs (meds, wound care, etc )  - Arrange for interpretive services to assist at discharge as needed  - Refer to Case Management Department for coordinating discharge planning if the patient needs post-hospital services based on physician/advanced practitioner order or complex needs related to functional status, cognitive ability, or social support system  Outcome: Progressing     Problem: INFECTION - ADULT  Goal: Absence or prevention of progression during hospitalization  Description: INTERVENTIONS:  - Assess and monitor for signs and symptoms of infection  - Monitor lab/diagnostic results  - Monitor all insertion sites, i e  indwelling lines, tubes, and drains  - Monitor endotracheal if appropriate and nasal secretions for changes in amount and color  - [place] appropriate cooling/warming therapies per order  - Administer medications as ordered  - Instruct and encourage patient and family to use good hand hygiene technique  - Identify and instruct in appropriate isolation precautions for identified infection/condition  Outcome: Progressing

## 2022-01-26 NOTE — UTILIZATION REVIEW
Notification of Discharge   This is a Notification of Discharge from our facility [address]  Please be advised that this patient has been discharge from our facility  Below you will find the admission and discharge date and time including the patients disposition  UTILIZATION REVIEW CONTACT:  [address] [place]  Utilization   Network Utilization Review Department  Phone: [phone] x carefully listen to the prompts  All voicemails are confidential   Email: [email or web address]     PHYSICIAN ADVISORY SERVICES:  FOR [id number] REVIEW - MEDICAL NECESSITY DENIAL  Phone: [phone]  Fax: [phone]  Email: [email or web address]     PRESENTATION DATE: 1/23/2022 10:58 AM  OBERVATION ADMISSION DATE:   INPATIENT ADMISSION DATE: 1/24/22  6:56 PM   DISCHARGE DATE: 1/25/2022  1:00 PM  DISPOSITION: Home/Self Care Home/Self Care      IMPORTANT INFORMATION:  Send all requests for admission clinical reviews, approved or denied determinations and any other requests to dedicated fax number below belonging to the campus where the patient is receiving treatment   List of dedicated fax numbers:  [address] [place] [address] DENIALS (Administrative/Medical Necessity) [phone]   [address] [facility] (Maternity/NICU/Pediatrics) [phone]   Fitzhugh Boots [phone]   130 [facility] [phone]   [address] [place] [address] [phone]   2000 [facility] [address] [place] [address],[address] [phone]   [facility]  [phone]   [address] [place], [place] [address] [facility] 1000 [facility] [phone]

## 2022-02-07 ENCOUNTER — HOSPITAL ENCOUNTER (OUTPATIENT)
Dept: NEUROLOGY | Facility: HOSPITAL | Age: 77
Discharge: HOME | End: 2022-02-07
Attending: PSYCHIATRY & NEUROLOGY
Payer: COMMERCIAL

## 2022-02-07 ENCOUNTER — TELEPHONE (OUTPATIENT)
Dept: VASCULAR SURGERY | Facility: CLINIC | Age: 77
End: 2022-02-07

## 2022-02-07 DIAGNOSIS — M79.89 LEFT ARM SWELLING: Primary | ICD-10-CM

## 2022-02-07 DIAGNOSIS — R55 SYNCOPE: ICD-10-CM

## 2022-02-07 PROCEDURE — 95816 EEG AWAKE AND DROWSY: CPT

## 2022-02-07 RX ORDER — METHYLPREDNISOLONE 32 MG/1
TABLET ORAL
Qty: 2 TABLET | Refills: 0 | Status: ON HOLD | OUTPATIENT
Start: 2022-02-07 | End: 2022-07-21 | Stop reason: ALTCHOICE

## 2022-02-07 NOTE — TELEPHONE ENCOUNTER
Call the patient to schedule an CT Venogram /Left Arm that was order by SP  PT was confuse about why she needs to have this CT done  Request to talk with the Vascular Nursing Dept     The Vascular Nursing Dept is aware about the patient request, spoke with Jak she will take care of this case

## 2022-02-07 NOTE — TELEPHONE ENCOUNTER
Spoke with the pt today  She is schedule for her CT Venogram for this coming Friday ( 02/11/2022) at 10 am  At [facility]      Patient is aware about the date and time

## 2022-02-08 ENCOUNTER — TELEPHONE (OUTPATIENT)
Dept: INTERVENTIONAL RADIOLOGY/VASCULAR | Facility: HOSPITAL | Age: 77
End: 2022-02-08

## 2022-02-08 ENCOUNTER — TELEPHONE (OUTPATIENT)
Dept: RADIOLOGY | Facility: HOSPITAL | Age: 77
End: 2022-02-08

## 2022-02-08 PROCEDURE — 95813 EEG EXTND MNTR 61-119 MIN: CPT | Performed by: PSYCHIATRY & NEUROLOGY

## 2022-02-09 ENCOUNTER — TELEPHONE (OUTPATIENT)
Dept: NEUROLOGY | Facility: CLINIC | Age: 77
End: 2022-02-09

## 2022-02-09 NOTE — TELEPHONE ENCOUNTER
Patient calling in  She states she saw Dr Chaitanya Garcia in the hospital and had an EEG completed  Patient is looking for results but is not currently a patient of our office and does not have a follow up scheduled  I first advised patient to call her PCP to see if they can review these results but her PCP office is closed today  Patient wants to know her EEG results today  Sent Dr Chaitanya Garcia a tiger text asking if he would be able to contact patient regarding her EEG results  Provided him with patient's cb#

## 2022-02-11 ENCOUNTER — HOSPITAL ENCOUNTER (OUTPATIENT)
Dept: CT IMAGING | Facility: HOSPITAL | Age: 77
Discharge: HOME | End: 2022-02-11
Payer: COMMERCIAL

## 2022-02-11 DIAGNOSIS — M79.89 LEFT ARM SWELLING: ICD-10-CM

## 2022-02-11 PROCEDURE — 71275 CT ANGIOGRAPHY CHEST: CPT

## 2022-02-11 PROCEDURE — G1004 CDSM NDSC: HCPCS

## 2022-02-11 RX ADMIN — IOHEXOL 100 ML: 350 INJECTION, SOLUTION INTRAVENOUS at 10:15

## 2022-02-14 NOTE — TELEPHONE ENCOUNTER
Pt calling in regarding her EEG results that was ordered by Dr Scott Delgado while pt was at [address]  Pt is extremely frustrated as she doesn't know her EEG results and is asking if she can drive  Pt is not currently a patient of our practice and has no upcoming consults scheduled with our practice  Advised pt to call her PCP  Per pt , PCP does not have EEG results  Faxed copy of EEG results to pt's PCP ( Dr Kulwant Martinez)  to review

## 2022-02-16 ENCOUNTER — TELEPHONE (OUTPATIENT)
Dept: VASCULAR SURGERY | Facility: CLINIC | Age: 77
End: 2022-02-16

## 2022-02-16 NOTE — TELEPHONE ENCOUNTER
Received patient complaint regarding her recent EEG results  She has not heard back regarding the results and is questioning if she is able to drive  Clinical team forwarded the results to her pcp  I reached out to their office this morning to see if they reached out to the patient to discuss her results  PCP office is closed on Wed  Would you be agreeable to have clinical team reach out to discuss EEG results and would you recommend that she is able to operate a vehicle? Patient does not follow with our practice      Please advise    [phone]

## 2022-02-16 NOTE — TELEPHONE ENCOUNTER
I have never seen patient  I only read EEG done outpatient  I will not make any such decision  Please reach out to neurologist patient actually saw in hospital  Thank you

## 2022-02-16 NOTE — TELEPHONE ENCOUNTER
Patient called regarding the results  Made her aware that no vascular findings were found that would attribute to the arm swelling and pain  Also made her aware that the results were forwarded to her PCP as well, for follow up

## 2022-02-16 NOTE — TELEPHONE ENCOUNTER
Staff message sent to Dr Rachana Infante       Called and Left a message on pt's answering machine for a call back

## 2022-02-16 NOTE — LETTER
Review of CT venogram shows no vascular findings that would be attributable to arm swelling  However, there is 1 8 x 1 5 cm wedge-shaped left lower lobe opacity with small amounts of air within the lesion  Pulmonary consultation is recommended        -Contact PCP and alert/act on incidental finding and recommendation for pulm consult  -CC: Dr Jean-Claude Viveros  -CC: Dr Lynnette Casiano          CT venogram 2/11/2022  IMPRESSION:     No evidence of central venous obstruction, thrombosis, or collateralization  No CT venogram findings to suggest a venous cause of of reported left arm swelling      Unchanged left lower lobe wedge-shaped opacity since 1/23/2022  Consider pulmonary consultation  Also recommend continued imaging follow-up, for example chest CT in 2 months

## 2022-02-16 NOTE — TELEPHONE ENCOUNTER
Review of CT venogram shows no vascular findings that would be attributable to arm swelling  However, there is 1 8 x 1 5 cm wedge-shaped left lower lobe opacity with small amounts of air within the lesion  Pulmonary consultation is recommended        -Contact PCP and alert/act on incidental finding and recommendation for pulm consult  -CC: Dr Nighat Cabral  -CC: Dr Justin Webster          CT venogram 2/11/2022  IMPRESSION:     No evidence of central venous obstruction, thrombosis, or collateralization  No CT venogram findings to suggest a venous cause of of reported left arm swelling      Unchanged left lower lobe wedge-shaped opacity since 1/23/2022  Consider pulmonary consultation  Also recommend continued imaging follow-up, for example chest CT in 2 months

## 2022-02-16 NOTE — TELEPHONE ENCOUNTER
Received call from radiology dept to notify us that there are significant findings from CT venogram done today  Per impression, "No evidence of central venous obstruction, thrombosis, or collateralization  No CT venogram findings to suggest a venous cause of of reported left arm swelling      Unchanged left lower lobe wedge-shaped opacity since 1/23/2022  Consider pulmonary consultation  Also recommend continued imaging follow-up, for example chest CT in 2 months "    Pt does not have any follow up scheduled  Please advise

## 2022-02-22 ENCOUNTER — TELEPHONE (OUTPATIENT)
Dept: OTHER | Facility: HOSPITAL | Age: 77
End: 2022-02-22

## 2022-02-23 ENCOUNTER — TELEPHONE (OUTPATIENT)
Dept: OBGYN CLINIC | Facility: MEDICAL CENTER | Age: 77
End: 2022-02-23

## 2022-02-23 NOTE — TELEPHONE ENCOUNTER
Patient calling about her arm, she seen a dr at [facility] for her arm, I did not see any appt for her arm, but she was in hospital she stated and she had tests completed for arm pain  She was confused and wanted to speak to pcp before scheduling

## 2022-03-24 ENCOUNTER — TELEPHONE (OUTPATIENT)
Dept: PULMONOLOGY | Facility: CLINIC | Age: 77
End: 2022-03-24

## 2022-03-24 NOTE — TELEPHONE ENCOUNTER
Spoke with Joanie Bridges  She said she has pressure in her lungs and she is very sleepy  She is yawning non stop on the phone  Denies cough, wheeze, chest tightness or SOB  She said she does have thyroid issues  I advised her to call her PCP and we will see her next week

## 2022-03-24 NOTE — TELEPHONE ENCOUNTER
Patient calling stating she has an appt on 3/30 but she has pressure in her lungs and is concerned   Please advise [phone]

## 2022-03-29 RX ORDER — AZITHROMYCIN 250 MG/1
TABLET, FILM COATED ORAL
Status: ON HOLD | COMMUNITY
Start: 2022-03-10 | End: 2022-07-21 | Stop reason: ALTCHOICE

## 2022-03-29 RX ORDER — DIPHENHYDRAMINE HCL 25 MG
CAPSULE ORAL
COMMUNITY
Start: 2022-02-07

## 2022-03-29 RX ORDER — METHYLPREDNISOLONE 16 MG/1
TABLET ORAL
Status: ON HOLD | COMMUNITY
Start: 2022-02-07 | End: 2022-07-21 | Stop reason: ALTCHOICE

## 2022-03-30 ENCOUNTER — OFFICE VISIT (OUTPATIENT)
Dept: PULMONOLOGY | Facility: CLINIC | Age: 77
End: 2022-03-30
Payer: COMMERCIAL

## 2022-03-30 VITALS
BODY MASS INDEX: 25.61 KG/M2 | HEART RATE: 78 BPM | OXYGEN SATURATION: 98 % | WEIGHT: 150 LBS | SYSTOLIC BLOOD PRESSURE: 124 MMHG | HEIGHT: 64 IN | DIASTOLIC BLOOD PRESSURE: 80 MMHG | RESPIRATION RATE: 16 BRPM | TEMPERATURE: 97.7 F

## 2022-03-30 DIAGNOSIS — R93.89 ABNORMAL CHEST CT: Primary | ICD-10-CM

## 2022-03-30 DIAGNOSIS — J44.9 OBSTRUCTIVE LUNG DISEASE (HCC): ICD-10-CM

## 2022-03-30 DIAGNOSIS — G47.33 OSA (OBSTRUCTIVE SLEEP APNEA): ICD-10-CM

## 2022-03-30 DIAGNOSIS — E03.9 ACQUIRED HYPOTHYROIDISM: ICD-10-CM

## 2022-03-30 PROCEDURE — 99214 OFFICE O/P EST MOD 30 MIN: CPT | Performed by: INTERNAL MEDICINE

## 2022-03-30 NOTE — PROGRESS NOTES
Progress Note - Pulmonary   Joeseph Kawasaki 77 y o  female MRN: [id number]   Encounter: [id number]      Assessment/Plan:  Patient is a 69-year-old female with past medical history significant pulmonary embolus who presents for routine follow-up  Pt note that she was unable to tlerate the cpap; she would prefer an oral appliance  Abnormal PFTs  -  Denies primary smoking  -  Will recheck PFTs as they are out of proportion to her symptoms    Concern for pulmonary infarct  -  Recheck CT chest in 3 mos from previous imaging    Obstructive sleep apnea/nocturnal hypoxia  -  Home sleep study w/ AHI 24 and desats to 66%  -  Working with dental on acquiring an oral appliance  -  Was unable to tolerate CPAP    Dyspnea on exertion  -  Strong family history of asthma  -  Not using inhalers     Remote history of pulmonary embolism  -  Currently requiring only aspirin 81 mg  -  Occurred about 1990 or so  -  Last took anticoagulants in about 2011     Hypothyroidism  -  Normal TSH   -  Continue synthroid    Patient may follow up in 6 months or sooner as necessary  Orders:  Orders Placed This Encounter   Procedures    CT chest without contrast     Standing Status:   Future     Standing Expiration Date:   3/30/2026     Scheduling Instructions: There is no prep for this study  Please bring your insurance cards, a form of photo ID and a list of your medications with you  Arrive 15 minutes prior to your appointment time to register  On the day of your test, please bring any prior CT or MRI studies of this area with you that were not performed at a [facility]  To schedule this appointment, please contact Central Scheduling at [phone]  Order Specific Question:   What is the patient's sedation requirement?      Answer:   No Sedation     Order Specific Question:   Release to patient through Mychart     Answer:   Immediate     Order Specific Question:   Reason for Exam (FREE TEXT) Answer:   lung consolidation       Subjective: The patient notes that she has difficulty sleeping at night  She denies cough, congestion, fevers or chills  The patient had a seizure about a month ago requiring admission  She is not currently taking any seizure medications  She has had no additional or similar episodes  She is not currently using any inhalers  She feels her breathing is overall doing well  Inhaler Regimen:  None    Remainder of review of systems negative except as described in HPI  The following portions of the patient's history were reviewed and updated as appropriate: allergies, current medications, past family history, past medical history, past social history, past surgical history and problem list      Objective:   Vitals: Blood pressure 124/80, pulse 78, temperature 97 7 °F (36 5 °C), temperature source Tympanic, resp  rate 16, height 5' 4" (1 626 m), weight 68 kg (150 lb), SpO2 98 % , RA, Body mass index is 25 75 kg/m²  Physical Exam  Gen: Pleasant, awake, alert, oriented x 3, no acute distress  HEENT: Mucous membranes moist, no oral lesions, no thrush  NECK: No accessory muscle use, JVP not elevated  Cardiac: RRR, single S1, single S2, no murmurs, no rubs, no gallops  Lungs: CTA b/l  Abdomen: normoactive bowel sounds, soft nontender, nondistended, no rebound or rigidity, no guarding  Extremities: no cyanosis, no clubbing, no LE edema  MSK:  Strength equal in all extremities  Derm:  No rashes/lesions noted  Neuro:  Appropriate mood/affect    Labs: I have personally reviewed pertinent lab results  Lab Results   Component Value Date    WBC 11 32 (H) 01/24/2022    HGB 13 9 01/24/2022     01/24/2022     Lab Results   Component Value Date    CREATININE 0 72 01/24/2022        Imaging and other studies: I have personally reviewed pertinent reports  CT venogram 2/11/22  Radiology findings:  VASCULAR STRUCTURES: The superior vena cava is widely patent    The right brachiocephalic vein is widely patent  The left brachiocephalic vein is widely patent  Bilateral jugular veins are widely patent although the right is asymmetrically larger than   the left  There is a large left cephalic vein that joins the left subclavian vein (series 202 image 52) with a somewhat bulbous appearance but no evidence of clot or stenosis  Geometric appearance is similar to the right cephalic arch and subclavian   vein  The left subclavian vein also appears patent  No evidence of venous collaterals in the chest wall  No thoracic aortic aneurysm  Calcium at the origin of the left subclavian artery  Great vessels are otherwise patent  OTHER FINDINGS:   HEART:  Normal cardiac size  No pericardial effusions  LUNGS:  Unchanged 1 8 x 1 5 cm wedge-shaped left lower lobe opacity  There are small amounts of air within the lesion  Apical scarring  PLEURA:  No pleural effusion  MEDIASTINUM AND CRISTIANO:  No mass or significant lymphadenopathy  Specifically no left axillary mass or adenopathy  Pulmonary Function Testing: I have personally reviewed pertinent reports  and I have personally reviewed pertinent films in PACS  11/2021:  Air trapping with diffusion defect  FEV1/FVC Ratio: 75 %  Forced Vital Capacity: 1 82 L    66 % predicted  FEV1: 1 37 L     62 % predicted  Lung volumes by body plethysmography:   Total Lung Capacity 139 % predicted   Residual volume 239 % predicted  DLCO corrected for patients hemoglobin level: 37 %    Sleep Study:  I have reviewed the results  AHI 23    Vandana Zhao

## 2022-04-01 ENCOUNTER — HOSPITAL ENCOUNTER (OUTPATIENT)
Dept: MRI IMAGING | Facility: HOSPITAL | Age: 77
Discharge: HOME | End: 2022-04-01
Payer: COMMERCIAL

## 2022-04-01 DIAGNOSIS — M75.02 ADHESIVE CAPSULITIS OF LEFT SHOULDER: ICD-10-CM

## 2022-04-01 DIAGNOSIS — M75.32 CALCIFIC TENDINITIS OF LEFT SHOULDER: ICD-10-CM

## 2022-04-01 DIAGNOSIS — M25.512 LEFT SHOULDER PAIN, UNSPECIFIED CHRONICITY: ICD-10-CM

## 2022-04-01 PROCEDURE — 73221 MRI JOINT UPR EXTREM W/O DYE: CPT

## 2022-05-03 ENCOUNTER — TELEPHONE (OUTPATIENT)
Dept: PULMONOLOGY | Facility: CLINIC | Age: 77
End: 2022-05-03

## 2022-05-03 NOTE — TELEPHONE ENCOUNTER
I called pt and she stated that she spoke to someone earlier and explained that all she wants is a appt with Alvenia Lies after she has her ct chest 5/8/222  I explained that we do not have anything available with Alvenia Lies  She requested for me to send a message to him to see what else can we do

## 2022-05-03 NOTE — TELEPHONE ENCOUNTER
Pt called re: she has a CT scan scheduled for this Sunday and wanted to know when next appt was  Advised pt she is due in 6 months which would be September but we don't have the schedule out yet for that month  Pt proceeded to explain that she's very sleepy during the day and yesterday she took a 3 hours nap  She cant seem to get a set amount of sleep during the night and is up every 2 hours like clock work  Pt mentioned maybe making an appt w/ Dr Esteban Cam next week    Please advise: [phone]

## 2022-05-03 NOTE — TELEPHONE ENCOUNTER
Please see what the status is for her oral appliance she is acquiring due to intolerance to CPAP  She is also more than welcome to have a sick visit to come in and discuss

## 2022-05-08 ENCOUNTER — HOSPITAL ENCOUNTER (OUTPATIENT)
Dept: CT IMAGING | Facility: HOSPITAL | Age: 77
Discharge: HOME | End: 2022-05-08
Attending: INTERNAL MEDICINE
Payer: COMMERCIAL

## 2022-05-08 DIAGNOSIS — R93.89 ABNORMAL CHEST CT: ICD-10-CM

## 2022-05-08 PROCEDURE — G1004 CDSM NDSC: HCPCS

## 2022-05-08 PROCEDURE — 71250 CT THORAX DX C-: CPT

## 2022-05-10 ENCOUNTER — OFFICE VISIT (OUTPATIENT)
Dept: PULMONOLOGY | Facility: CLINIC | Age: 77
End: 2022-05-10
Payer: COMMERCIAL

## 2022-05-10 VITALS
BODY MASS INDEX: 26.6 KG/M2 | DIASTOLIC BLOOD PRESSURE: 80 MMHG | SYSTOLIC BLOOD PRESSURE: 115 MMHG | RESPIRATION RATE: 18 BRPM | HEART RATE: 88 BPM | HEIGHT: 64 IN | OXYGEN SATURATION: 97 % | WEIGHT: 155.8 LBS | TEMPERATURE: 98.3 F

## 2022-05-10 DIAGNOSIS — R91.1 LUNG NODULE: Primary | ICD-10-CM

## 2022-05-10 PROCEDURE — 99214 OFFICE O/P EST MOD 30 MIN: CPT | Performed by: INTERNAL MEDICINE

## 2022-05-10 NOTE — PROGRESS NOTES
Progress Note - Pulmonary   Niecy Hayes 77 y o  female MRN: [id number]   Encounter: [id number]      Assessment/Plan:  Patient is a 80-year-old female with past medical history significant for pulmonary embolus who presents for routine follow-up  The patient has been doing overall well since last visit  She does report significant difficulty with her sleeping  She did find over-the-counter sleep aid which she took once which she reported very much helped with her sleep  She is encouraged use this more frequently  Additionally, a significant lung nodules noted on imaging which is concerning after discussion with Radiology  Will check PET-CT  1  Lung nodule  -     NM PET CT skull base to mid thigh; Future; Expected date: 05/10/2022    Abnormal PFTs  -  May consider recheck following PET CT     Lung nodule  -  given size and radiographic nature which is concerning for malignancy; check PET CT     Obstructive sleep apnea/nocturnal hypoxia  -  Home sleep study w/ AHI 24 and desats to 66%  -  Unable to tolerate cpap  -  Not interested in restarting  -  May trial home sleep aide     Dyspnea on exertion  -  Strong family history of asthma  -  Not using inhalers  -  Breathing is stable     Remote history of pulmonary embolism  -  Currently requiring only aspirin 81 mg  -  Occurred about 1990 or so  -  Last took anticoagulants in about 2011     Hypothyroidism  -  Normal TSH   -  Continue synthroid    Patient may follow up in 3-4 months or sooner as necessary  Orders:  Orders Placed This Encounter   Procedures    NM PET CT skull base to mid thigh     Standing Status:   Future     Standing Expiration Date:   5/10/2026     Scheduling Instructions: Your study will take approximately 2 hours to complete    You will be receiving an injection for this test  Do not eat/drink anything but plain water 6 hours prior to the start of the test   Please bring your insurance cards, a form of photo ID and a list of your medications with you  Arrive 15 minutes prior to your appointment time in order to register  To schedule this appointment, please contact Central Scheduling at [phone]  Order Specific Question:   Release to patient through [facility]     Answer:   Immediate     Order Specific Question:   Reason for Exam (FREE TEXT)     Answer:   lung nodule       Subjective: The patient notes that she is waking up every 2 hours  She tries to work herself to exhaustion until she is able to fall asleep  She can only get about 5 hours of sleep this way  She took an over the counter sleep aid and felt good while taking it  She notes she starts off sleeping nearly sitting up  She will then switch to her right side  She does have a shoulder injury  Inhaler Regimen:  None    Remainder of review of systems negative except as described in HPI  The following portions of the patient's history were reviewed and updated as appropriate: allergies, current medications, past family history, past medical history, past social history, past surgical history and problem list      Objective:   Vitals: Blood pressure 115/80, pulse 88, temperature 98 3 °F (36 8 °C), temperature source Tympanic, resp  rate 18, height 5' 4" (1 626 m), weight 70 7 kg (155 lb 12 8 oz), SpO2 97 % , RA, Body mass index is 26 74 kg/m²  Physical Exam  Gen: Pleasant, awake, alert, oriented x 3, no acute distress  HEENT: Mucous membranes moist, no oral lesions, no thrush  NECK: No accessory muscle use, JVP not elevated  Cardiac: RRR, single S1, single S2, no murmurs, no rubs, no gallops  Lungs: CTA b/l  Abdomen: normoactive bowel sounds, soft nontender, nondistended, no rebound or rigidity, no guarding  Extremities: no cyanosis, no clubbing, no LE edema  MSK:  Strength equal in all extremities  Derm:  No rashes/lesions noted  Neuro:  Appropriate mood/affect    Labs: I have personally reviewed pertinent lab results    Lab Results Component Value Date    WBC 11 32 (H) 01/24/2022    HGB 13 9 01/24/2022     01/24/2022     Lab Results   Component Value Date    CREATININE 0 72 01/24/2022        Imaging and other studies: I have personally reviewed pertinent films in PACS with a Radiologist   CT chest 05/08/2022  My interpretation:  1 4 x 1 8 cm left lower lobe nodule  Radiology findings:  LUNGS:  There is a stable focal opacity in the left lower lobe measuring 1 4 x 1 8 cm  Lungs are otherwise clear though evaluation is degraded by respiratory motion  There is no tracheal or endobronchial lesion  PLEURA:  Unremarkable  HEART/GREAT VESSELS: Heart is unremarkable for patient's age  No thoracic aortic aneurysm  MEDIASTINUM AND CRISTIANO:  Unremarkable  CHEST WALL AND LOWER NECK:  Unremarkable  Pulmonary Function Testing: I have personally reviewed pertinent reports  and I have personally reviewed pertinent films in PACS  11/10/21:  Severe diffusion defect with air trapping  FEV1/FVC Ratio: 75 %  Forced Vital Capacity: 1 82 L    66 % predicted  FEV1: 1 37 L     62 % predicted     Lung volumes by body plethysmography:   Total Lung Capacity 139 % predicted   Residual volume 239 % predicted     DLCO corrected for patients hemoglobin level: 37 %    Vandana Jon

## 2022-05-17 ENCOUNTER — TELEPHONE (OUTPATIENT)
Dept: PULMONOLOGY | Facility: CLINIC | Age: 77
End: 2022-05-17

## 2022-05-17 NOTE — TELEPHONE ENCOUNTER
Pt called re: Dr Ezekiel Villalobos had ordered a PET scan for her but she is allergic to contrast  She wanted the doctor to call her back to discuss what exactly she will be receiving with the PET scan    Please advise: [phone]

## 2022-05-18 NOTE — TELEPHONE ENCOUNTER
Patient is calling again, she would really like to ask questions regarding this test before Monday   Please advise

## 2022-05-18 NOTE — TELEPHONE ENCOUNTER
Reports IV contrast allergy is rash and warm feeling in the head    Called PET at [facility] waiting to hear back    Brenda Correa has been updated

## 2022-05-19 ENCOUNTER — TELEPHONE (OUTPATIENT)
Dept: OTHER | Facility: HOSPITAL | Age: 77
End: 2022-05-19

## 2022-05-19 NOTE — TELEPHONE ENCOUNTER
Left voicemail for patient after speaking to PET scan department, the contrast is not iodinated therefore will not cause rash/warm feeling in head-   I explained to patient that injection is "radioactive saline" , CT scan after the PET scan is without contrast

## 2022-05-23 ENCOUNTER — HOSPITAL ENCOUNTER (OUTPATIENT)
Dept: RADIOLOGY | Age: 77
Discharge: HOME | End: 2022-05-23
Payer: COMMERCIAL

## 2022-05-23 DIAGNOSIS — R91.1 LUNG NODULE: ICD-10-CM

## 2022-05-23 LAB — GLUCOSE SERPL-MCNC: 100 MG/DL (ref 65–140)

## 2022-05-23 PROCEDURE — 78815 PET IMAGE W/CT SKULL-THIGH: CPT

## 2022-05-23 PROCEDURE — G1004 CDSM NDSC: HCPCS

## 2022-05-23 PROCEDURE — 82948 REAGENT STRIP/BLOOD GLUCOSE: CPT

## 2022-05-23 PROCEDURE — A9552 F18 FDG: HCPCS

## 2022-05-24 ENCOUNTER — TELEPHONE (OUTPATIENT)
Dept: OTHER | Facility: HOSPITAL | Age: 77
End: 2022-05-24

## 2022-05-24 ENCOUNTER — TELEPHONE (OUTPATIENT)
Dept: PULMONOLOGY | Facility: CLINIC | Age: 77
End: 2022-05-24

## 2022-05-24 DIAGNOSIS — R91.1 PULMONARY NODULE: Primary | ICD-10-CM

## 2022-05-24 NOTE — TELEPHONE ENCOUNTER
Pt called requesting a call back from Dr Kulwant Walker to go over her PET results    Please advise: [phone]

## 2022-05-24 NOTE — TELEPHONE ENCOUNTER
IR will reach out the patient and schedule the patient once their provider reviews the chart  I will keep an eye on it  Normally takes about 24-48 hours

## 2022-05-24 NOTE — TELEPHONE ENCOUNTER
Spoke with patient left lower lobe nodule concerning for malignancy, Dr Ezekiel Villalobos discussing with IR whether biopsy or navigational bronchoscopy    - he will update patient with proceeding plan

## 2022-05-25 ENCOUNTER — PREP FOR PROCEDURE (OUTPATIENT)
Dept: INTERVENTIONAL RADIOLOGY/VASCULAR | Facility: CLINIC | Age: 77
End: 2022-05-25

## 2022-05-25 DIAGNOSIS — R93.89 ABNORMAL CHEST CT: Primary | ICD-10-CM

## 2022-05-25 NOTE — TELEPHONE ENCOUNTER
Lvm for pt to call the office back  Advised pt she spoke to [facility] on 05/24 and Dr Miriam Mathews was discussing with IR for biopsy or a Dima bronch  He did end up deciding on the IR BX which the order is in and they will reach out to the patient to scheduled  I asked that she call back if she has further questions

## 2022-05-25 NOTE — TELEPHONE ENCOUNTER
Pt would like to know what the next steps are now that the results are in and what she needs to do next   Please call

## 2022-05-26 NOTE — TELEPHONE ENCOUNTER
Called and spoke to Neema Mac  She is agreeable to proceed with RI biopsy  Can we please help to make sure this is scheduled

## 2022-05-26 NOTE — TELEPHONE ENCOUNTER
Spoke with patient on 5/24/2022- and discussed how left lower lobe nodules concerning for malignancy it and Dr Lyndon Sicard was recommending IR biopsy which was then placed    - Dr Kasandra Maldonado not sure if he would like to call and discuss further

## 2022-05-26 NOTE — TELEPHONE ENCOUNTER
Patient calling asking to speak with Higinio Herrera, she stated nobody ever called her about her results and what they found  She stated she knows it's cancer so she wants to know why she needs a biopsy   Please advise [phone]

## 2022-05-31 NOTE — TELEPHONE ENCOUNTER
Patient left VM asking about her biopsy again and would like to know if it is scheduled  Please advise

## 2022-05-31 NOTE — TELEPHONE ENCOUNTER
Pt called she stated she already got the call for the IR bx and then she asked for a referral for ENT I gave her the number for [facility]

## 2022-06-01 NOTE — TELEPHONE ENCOUNTER
Patient calling again saying she would like to speak to Dr Sharon gray  She states she doesn't understand why her biopsy is so far away  She said the longer she waits, the worst her condition can get  Please advise   [phone]

## 2022-06-03 NOTE — TELEPHONE ENCOUNTER
Patient called back, she stated it was concerning a skin issue, I advised her to call her pcp or a dermatologist

## 2022-06-03 NOTE — TELEPHONE ENCOUNTER
lvm asking patient to call back with the specific questions that she has so the providers can gather informations for her

## 2022-06-03 NOTE — TELEPHONE ENCOUNTER
She just spoke to me and Dr Marilyn Lauren, can you please find out if there is a specific question she has

## 2022-06-06 NOTE — TELEPHONE ENCOUNTER
Return patient's phone call left message  Reviewed the patient's chart and she was seen by ENT for an unrelated issue  The patient is scheduled on approximately June 15th for an IR guided biopsy which is necessary based on the PET-CT scan  I did discuss this with the patient with phone call proximally week ago when she understood at that time  Happy to reiterate with the patient as necessary

## 2022-06-06 NOTE — TELEPHONE ENCOUNTER
Patient is calling, she is getting frustrated with her care  She said she went to the ENT and stuck something down her nose and they didn't see anything and that maybe its farther down  She wants results now  She wants to get her health sorted out  She said she has been dealing with this since February and doesn't feel like she is getting any results  She would appreciate your call back to discuss next steps   Please advise

## 2022-06-13 ENCOUNTER — OFFICE VISIT (OUTPATIENT)
Dept: OBGYN CLINIC | Facility: CLINIC | Age: 77
End: 2022-06-13

## 2022-06-13 VITALS
HEIGHT: 64 IN | WEIGHT: 154 LBS | SYSTOLIC BLOOD PRESSURE: 134 MMHG | BODY MASS INDEX: 26.29 KG/M2 | HEART RATE: 94 BPM | DIASTOLIC BLOOD PRESSURE: 81 MMHG

## 2022-06-13 DIAGNOSIS — N89.8 VAGINAL ITCHING: Primary | ICD-10-CM

## 2022-06-13 DIAGNOSIS — Z20.2 POSSIBLE EXPOSURE TO STD: ICD-10-CM

## 2022-06-13 LAB
BV WHIFF TEST VAG QL: NEGATIVE
CLUE CELLS SPEC QL WET PREP: NEGATIVE
PH SMN: 4 [PH]
SL AMB POCT WET MOUNT: NEGATIVE
T VAGINALIS VAG QL WET PREP: NEGATIVE
YEAST VAG QL WET PREP: NEGATIVE

## 2022-06-13 PROCEDURE — 87591 N.GONORRHOEAE DNA AMP PROB: CPT | Performed by: NURSE PRACTITIONER

## 2022-06-13 PROCEDURE — 87210 SMEAR WET MOUNT SALINE/INK: CPT | Performed by: NURSE PRACTITIONER

## 2022-06-13 PROCEDURE — 99203 OFFICE O/P NEW LOW 30 MIN: CPT | Performed by: NURSE PRACTITIONER

## 2022-06-13 PROCEDURE — 87491 CHLMYD TRACH DNA AMP PROBE: CPT | Performed by: NURSE PRACTITIONER

## 2022-06-13 NOTE — H&P (VIEW-ONLY)
Assessment/Plan:    No problem-specific Assessment & Plan notes found for this encounter  Diagnoses and all orders for this visit:    Vaginal itching  -     Chlamydia/GC amplified DNA by PCR  -     POCT wet mount-reviewed test was negative for yeast, BV and Trichomonas  Patient reports she has 1% hydrocortisone cream that she will use- recommend to use sparingly twice a day for 1 week  Also recommend for patient to return to office for her annual gyn exam, she states she would like to wait until the results of this test is available  Return to office if symptoms not resolved  Possible exposure to STD  Reviewed will call results to patient        Subjective:      Patient ID: Mark Cowan is a 77 y o  female  HPI 78-year-old  002 new patient with vaginal discomfort  Pt reports itching for the past 1 week  Itching is more external and some in the vagina  She does report she did change laundry detergents/soaps  She denies discharge and she denies pelvic pain  She is sexually active x 7 years with same partner  She reports she did use lubricant when sexually active  She denies any pain with sexual activity  She denies any vaginal bleeding  She denies any urinary symptoms  Her partner was recently tested for STD  He said he had no STD's but pt is unsure and would like to be tested for chlamydia and gonorrhea  She thinks he may have been unfaithful  She declines blood work at this time      Patient with history of DVT, PE, obstructive lung disease, hypothyroidism, hearing loss, colon polyps, uterine polyps  Patient is scheduled for 06/15/2022 for IR guided done lung biopsy based on PET-CT scan    She is a retired LPN         The following portions of the patient's history were reviewed and updated as appropriate: allergies, current medications, past family history, past medical history, past social history, past surgical history and problem list     Review of Systems   Constitutional: Negative for chills and fever  Respiratory: Negative  Cardiovascular: Negative  Gastrointestinal: Negative for abdominal pain  Genitourinary: Negative for dysuria, frequency, genital sores, hematuria, pelvic pain, urgency, vaginal bleeding, vaginal discharge and vaginal pain  External itching          Objective:      /81 (BP Location: Right arm, Patient Position: Sitting, Cuff Size: Adult)   Pulse 94   Ht 5' 4" (1 626 m)   Wt 69 9 kg (154 lb)   Breastfeeding No   BMI 26 43 kg/m²          Physical Exam  Constitutional:       Appearance: Normal appearance  Cardiovascular:      Rate and Rhythm: Normal rate  Pulmonary:      Effort: Pulmonary effort is normal    Abdominal:      Palpations: Abdomen is soft  Tenderness: There is no abdominal tenderness  Skin:     General: Skin is warm  Neurological:      Mental Status: She is oriented to person, place, and time     Psychiatric:         Mood and Affect: Mood normal          Behavior: Behavior normal        external genitalia-no lesions, no erythema or tenderness noted, no swelling present  Vagina-very small amount white discharge  Cervix-no lesions, negative CMT  Uterus-anteverted, nontender  Adnexa-no masses, nontender    Wet mount KOH-negative for BV, yeast and Trichomonas

## 2022-06-13 NOTE — PROGRESS NOTES
Assessment/Plan:    No problem-specific Assessment & Plan notes found for this encounter  Diagnoses and all orders for this visit:    Vaginal itching  -     Chlamydia/GC amplified DNA by PCR  -     POCT wet mount-reviewed test was negative for yeast, BV and Trichomonas  Patient reports she has 1% hydrocortisone cream that she will use- recommend to use sparingly twice a day for 1 week  Also recommend for patient to return to office for her annual gyn exam, she states she would like to wait until the results of this test is available  Return to office if symptoms not resolved  Possible exposure to STD  Reviewed will call results to patient        Subjective:      Patient ID: Arline Bang is a 77 y o  female  HPI 78-year-old  002 new patient with vaginal discomfort  Pt reports itching for the past 1 week  Itching is more external and some in the vagina  She does report she did change laundry detergents/soaps  She denies discharge and she denies pelvic pain  She is sexually active x 7 years with same partner  She reports she did use lubricant when sexually active  She denies any pain with sexual activity  She denies any vaginal bleeding  She denies any urinary symptoms  Her partner was recently tested for STD  He said he had no STD's but pt is unsure and would like to be tested for chlamydia and gonorrhea  She thinks he may have been unfaithful  She declines blood work at this time      Patient with history of DVT, PE, obstructive lung disease, hypothyroidism, hearing loss, colon polyps, uterine polyps  Patient is scheduled for 06/15/2022 for IR guided done lung biopsy based on PET-CT scan    She is a retired LPN         The following portions of the patient's history were reviewed and updated as appropriate: allergies, current medications, past family history, past medical history, past social history, past surgical history and problem list     Review of Systems   Constitutional: Negative for chills and fever  Respiratory: Negative  Cardiovascular: Negative  Gastrointestinal: Negative for abdominal pain  Genitourinary: Negative for dysuria, frequency, genital sores, hematuria, pelvic pain, urgency, vaginal bleeding, vaginal discharge and vaginal pain  External itching          Objective:      /81 (BP Location: Right arm, Patient Position: Sitting, Cuff Size: Adult)   Pulse 94   Ht 5' 4" (1 626 m)   Wt 69 9 kg (154 lb)   Breastfeeding No   BMI 26 43 kg/m²          Physical Exam  Constitutional:       Appearance: Normal appearance  Cardiovascular:      Rate and Rhythm: Normal rate  Pulmonary:      Effort: Pulmonary effort is normal    Abdominal:      Palpations: Abdomen is soft  Tenderness: There is no abdominal tenderness  Skin:     General: Skin is warm  Neurological:      Mental Status: She is oriented to person, place, and time     Psychiatric:         Mood and Affect: Mood normal          Behavior: Behavior normal        external genitalia-no lesions, no erythema or tenderness noted, no swelling present  Vagina-very small amount white discharge  Cervix-no lesions, negative CMT  Uterus-anteverted, nontender  Adnexa-no masses, nontender    Wet mount KOH-negative for BV, yeast and Trichomonas

## 2022-06-15 ENCOUNTER — HOSPITAL ENCOUNTER (OUTPATIENT)
Dept: RADIOLOGY | Facility: HOSPITAL | Age: 77
Discharge: HOME | End: 2022-06-15
Admitting: RADIOLOGY
Payer: COMMERCIAL

## 2022-06-15 ENCOUNTER — HOSPITAL ENCOUNTER (OUTPATIENT)
Dept: RADIOLOGY | Facility: HOSPITAL | Age: 77
Discharge: HOME | End: 2022-06-15
Payer: COMMERCIAL

## 2022-06-15 VITALS
OXYGEN SATURATION: 98 % | RESPIRATION RATE: 16 BRPM | DIASTOLIC BLOOD PRESSURE: 80 MMHG | TEMPERATURE: 97.3 F | BODY MASS INDEX: 26.57 KG/M2 | HEART RATE: 65 BPM | WEIGHT: 154.8 LBS | SYSTOLIC BLOOD PRESSURE: 142 MMHG

## 2022-06-15 DIAGNOSIS — R93.89 ABNORMAL CHEST CT: ICD-10-CM

## 2022-06-15 LAB
ERYTHROCYTE [DISTWIDTH] IN BLOOD BY AUTOMATED COUNT: 14.1 % (ref 11.6–15.1)
HCT VFR BLD AUTO: 47.9 % (ref 34.8–46.1)
HGB BLD-MCNC: 14.9 G/DL (ref 11.5–15.4)
INR PPP: 0.98 (ref 0.84–1.19)
MCH RBC QN AUTO: 29.5 PG (ref 26.8–34.3)
MCHC RBC AUTO-ENTMCNC: 31.1 G/DL (ref 31.4–37.4)
MCV RBC AUTO: 95 FL (ref 82–98)
PLATELET # BLD AUTO: 157 THOUSANDS/UL (ref 149–390)
PMV BLD AUTO: 10.2 FL (ref 8.9–12.7)
PROTHROMBIN TIME: 12.8 SECONDS (ref 11.6–14.5)
RBC # BLD AUTO: 5.05 MILLION/UL (ref 3.81–5.12)
WBC # BLD AUTO: 4.25 THOUSAND/UL (ref 4.31–10.16)

## 2022-06-15 PROCEDURE — 88313 SPECIAL STAINS GROUP 2: CPT | Performed by: PATHOLOGY

## 2022-06-15 PROCEDURE — 32408 CORE NDL BX LNG/MED PERQ: CPT | Performed by: RADIOLOGY

## 2022-06-15 PROCEDURE — 71045 X-RAY EXAM CHEST 1 VIEW: CPT

## 2022-06-15 PROCEDURE — 85610 PROTHROMBIN TIME: CPT | Performed by: RADIOLOGY

## 2022-06-15 PROCEDURE — 88305 TISSUE EXAM BY PATHOLOGIST: CPT | Performed by: PATHOLOGY

## 2022-06-15 PROCEDURE — 88333 PATH CONSLTJ SURG CYTO XM 1: CPT | Performed by: PATHOLOGY

## 2022-06-15 PROCEDURE — 99152 MOD SED SAME PHYS/QHP 5/>YRS: CPT | Performed by: RADIOLOGY

## 2022-06-15 PROCEDURE — 32408 CORE NDL BX LNG/MED PERQ: CPT

## 2022-06-15 PROCEDURE — 88341 IMHCHEM/IMCYTCHM EA ADD ANTB: CPT | Performed by: PATHOLOGY

## 2022-06-15 PROCEDURE — 99152 MOD SED SAME PHYS/QHP 5/>YRS: CPT

## 2022-06-15 PROCEDURE — 85027 COMPLETE CBC AUTOMATED: CPT | Performed by: RADIOLOGY

## 2022-06-15 PROCEDURE — 88342 IMHCHEM/IMCYTCHM 1ST ANTB: CPT | Performed by: PATHOLOGY

## 2022-06-15 PROCEDURE — 99153 MOD SED SAME PHYS/QHP EA: CPT

## 2022-06-15 RX ORDER — MIDAZOLAM HYDROCHLORIDE 2 MG/2ML
INJECTION, SOLUTION INTRAMUSCULAR; INTRAVENOUS CODE/TRAUMA/SEDATION MEDICATION
Status: COMPLETED | OUTPATIENT
Start: 2022-06-15 | End: 2022-06-15

## 2022-06-15 RX ORDER — FENTANYL CITRATE 50 UG/ML
INJECTION, SOLUTION INTRAMUSCULAR; INTRAVENOUS CODE/TRAUMA/SEDATION MEDICATION
Status: COMPLETED | OUTPATIENT
Start: 2022-06-15 | End: 2022-06-15

## 2022-06-15 RX ADMIN — FENTANYL CITRATE 50 MCG: 50 INJECTION, SOLUTION INTRAMUSCULAR; INTRAVENOUS at 09:18

## 2022-06-15 RX ADMIN — MIDAZOLAM 1 MG: 1 INJECTION INTRAMUSCULAR; INTRAVENOUS at 09:18

## 2022-06-15 NOTE — PERIOPERATIVE NURSING NOTE
Patient received into APU via stretcher from IR  Patient resting on left side per orders for 2 hours  Right mid back bandaid clean dry and intact  Patient denies any c/o of pain at this time  Patient given sips of ginger ale to drink and tolerating

## 2022-06-15 NOTE — INTERVAL H&P NOTE
Update: (This section must be completed if the H&P was completed greater than 24 hrs to procedure or admission)    H&P reviewed  After examining the patient, I find no changed to the H&P since it had been written  Patient re-evaluated   Accept as history and physical     Tavo Thompson MD/Uma 15, 2022/10:03 AM

## 2022-06-15 NOTE — BRIEF OP NOTE (RAD/CATH)
INTERVENTIONAL RADIOLOGY PROCEDURE NOTE    Date: 6/15/2022    Procedure: IR BIOPSY LUNG    Preoperative diagnosis:   1  Abnormal chest CT         Postoperative diagnosis: Same  Surgeon: Jami Sandhu MD     Assistant: None  No qualified resident was available  Blood loss: minimal    Specimens: 4, 1 3 cm 18 G cores biopsies     Findings: Left lower lung nodule, biopsies performed  Trace post biopsy pneumothorax, not enlarging on repeat CT  Complications: None immediate      Anesthesia: conscious sedation

## 2022-06-15 NOTE — SEDATION DOCUMENTATION
Procedure ended, pt tolerated without incident bandaid to site  Pt back to APU for 2 hour bedrest, left side down  Chest xray in 2 hours 
stretcher
yes

## 2022-06-15 NOTE — DISCHARGE INSTRUCTIONS
Needle Biopsy of the Lung    May restart Aspirin tomorrow, June 16    WHAT YOU NEED TO KNOW:  A needle biopsy of the lung is a procedure to remove cells or tissue from your lung  You may have a fine needle aspiration biopsy (FNAB), or a core needle biopsy (CNB)  A FNAB is used to remove cells through a thin needle  CNB uses a thicker needle to remove lung tissue  The samples are collected and tested for inflammation, infection, or cancer  DISCHARGE INSTRUCTIONS:   Resume your normal diet  Small sips of flat soda will help with nausea  Limit your activity for 24 hours  Wound Care:      - Remove band aid in 24 hours  Contact Interventional Radiology at [phone] [facility] PATIENTS: Contact Interventional Radiology at [phone]) ([address]: Contact Interventional Radiology at [phone]) if any of the following occur:    - You have a fever greater than 101*    - You cough up large amounts of bright red blood     - You have chest pain with breathing    - You have shortness of breath    -You have persistent nausea and vomiting    - You have pus, redness or swelling around your biopsy site    - You have questions or concerns about your condition or care       Procedural Sedation   WHAT YOU NEED TO KNOW:   Procedural sedation is medicine used during procedures to help you feel relaxed and calm  You will remember little to none of the procedure  After sedation you may feel tired, weak, or unsteady on your feet  You may also have trouble concentrating or short-term memory loss  These symptoms should go away in 24 hours or less  DISCHARGE INSTRUCTIONS:     Call 911 or have someone else call for any of the following: You have sudden trouble breathing  You cannot be woken        Contact Interventional Radiology at [phone]   [facility] PATIENTS: Contact Interventional Radiology at [phone]) [facility] [place] PATIENTS: Contact Interventional Radiology at [phone]) if any of the following occur: You have a severe headache or dizziness  Your heart is beating faster than usual     You have a fever or chills  Your skin is itchy, swollen, or you have a rash  You have nausea or are vomiting for more than 8 hours after the procedure  You have questions or concerns about your condition or care  Self-care:   Have someone stay with you for 24 hours  This person can drive you to errands and help you do things around the house  This person can also watch for problems  Rest and do quiet activities for 24 hours  Do not exercise, ride a bike, or play sports  Stand up slowly to prevent dizziness and falls  Take short walks around the house with another person  Slowly return to your usual activities the next day  Do not drive or use dangerous machines or tools for 24 hours  You may injure yourself or others  Examples include a lawnmower, saw, or drill  Do not return to work for 24 hours if you use dangerous machines or tools for work  Do not make important decisions for 24 hours  For example, do not sign important papers or invest money  Drink liquids as directed  Liquids help flush the sedation medicine out of your body  Ask how much liquid to drink each day and which liquids are best for you  Eat small, frequent meals to prevent nausea and vomiting  Start with clear liquids such as juice or broth  If you do not vomit after clear liquids, you can eat your usual foods  Do not drink alcohol or take medicines that make you drowsy  This includes medicines that help you sleep and anxiety medicines  Ask your healthcare provider if it is safe for you to take pain medicine  Follow up with your healthcare provider as directed: Write down your questions so you remember to ask them during your visits

## 2022-06-16 ENCOUNTER — TELEPHONE (OUTPATIENT)
Dept: OBGYN CLINIC | Facility: CLINIC | Age: 77
End: 2022-06-16

## 2022-06-16 LAB
C TRACH DNA SPEC QL NAA+PROBE: NEGATIVE
N GONORRHOEA DNA SPEC QL NAA+PROBE: NEGATIVE

## 2022-06-16 NOTE — TELEPHONE ENCOUNTER
----- Message from Gareth Song sent at 6/16/2022  6:53 AM EDT -----  Culture Result is negative, please call patient to inform     Thanks

## 2022-06-20 ENCOUNTER — TELEPHONE (OUTPATIENT)
Dept: PULMONOLOGY | Facility: CLINIC | Age: 77
End: 2022-06-20

## 2022-06-20 NOTE — TELEPHONE ENCOUNTER
----- Message from Bernice Casiano MD sent at 6/17/2022  8:12 AM EDT -----  Would you mind letting the patient know about her diagnosis and see if you can get her on for tumor board on Monday        Thanks  ----- Message -----  From: Lab, Background User  Sent: 6/16/2022   2:54 PM EDT  To: Bernice Casiano MD

## 2022-06-20 NOTE — TELEPHONE ENCOUNTER
You need to send a message to someone from [facility] and I am not sure who that is

## 2022-06-20 NOTE — TELEPHONE ENCOUNTER
Spoke with patient she is aware biopsy was concerning for adenocarcinoma    [facility] for Monday, Dr Ginette Araujo will present

## 2022-06-20 NOTE — TELEPHONE ENCOUNTER
Pt called back stating she spoke w/ the "nurse" but she wants to speak with Dr Caroline Walter    Please advise: [phone]

## 2022-06-20 NOTE — TELEPHONE ENCOUNTER
Patient was made aware of pathology results-a reported we will call her next Monday after tumor board

## 2022-06-21 ENCOUNTER — DOCUMENTATION (OUTPATIENT)
Dept: HEMATOLOGY ONCOLOGY | Facility: CLINIC | Age: 77
End: 2022-06-21

## 2022-06-21 NOTE — PROGRESS NOTES
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

## 2022-06-21 NOTE — TELEPHONE ENCOUNTER
Called and reviewed diagnosis with patient  Her PCP ordered PFTs  I agree with this plan  Did discuss that surgery is not a for lung conclusion  Based on her PFTs, she may need CPAP  Patient understood  The patient's PFTs are scheduled for 6/30, will follow-up with patient after that

## 2022-06-22 ENCOUNTER — APPOINTMENT (OUTPATIENT)
Dept: LAB | Facility: HOSPITAL | Age: 77
End: 2022-06-22
Attending: INTERNAL MEDICINE
Payer: COMMERCIAL

## 2022-06-22 DIAGNOSIS — C34.92 SQUAMOUS CARCINOMA OF LUNG, LEFT (HCC): ICD-10-CM

## 2022-06-22 LAB
ALBUMIN SERPL BCP-MCNC: 4.2 G/DL (ref 3.5–5)
ALP SERPL-CCNC: 59 U/L (ref 34–104)
ALT SERPL W P-5'-P-CCNC: 12 U/L (ref 7–52)
ANION GAP SERPL CALCULATED.3IONS-SCNC: 7 MMOL/L (ref 4–13)
AST SERPL W P-5'-P-CCNC: 14 U/L (ref 13–39)
BILIRUB SERPL-MCNC: 0.69 MG/DL (ref 0.2–1)
BUN SERPL-MCNC: 17 MG/DL (ref 5–25)
CALCIUM SERPL-MCNC: 9.8 MG/DL (ref 8.4–10.2)
CHLORIDE SERPL-SCNC: 105 MMOL/L (ref 96–108)
CO2 SERPL-SCNC: 30 MMOL/L (ref 21–32)
CREAT SERPL-MCNC: 0.72 MG/DL (ref 0.6–1.3)
GFR SERPL CREATININE-BSD FRML MDRD: 82 ML/MIN/1.73SQ M
GLUCOSE P FAST SERPL-MCNC: 96 MG/DL (ref 65–99)
POTASSIUM SERPL-SCNC: 4.2 MMOL/L (ref 3.5–5.3)
PROT SERPL-MCNC: 7.8 G/DL (ref 6.4–8.4)
SODIUM SERPL-SCNC: 142 MMOL/L (ref 135–147)

## 2022-06-22 PROCEDURE — 36415 COLL VENOUS BLD VENIPUNCTURE: CPT

## 2022-06-22 PROCEDURE — 80053 COMPREHEN METABOLIC PANEL: CPT

## 2022-06-23 ENCOUNTER — PATIENT OUTREACH (OUTPATIENT)
Dept: HEMATOLOGY ONCOLOGY | Facility: CLINIC | Age: 77
End: 2022-06-23

## 2022-06-23 ENCOUNTER — TELEPHONE (OUTPATIENT)
Dept: PULMONOLOGY | Facility: CLINIC | Age: 77
End: 2022-06-23

## 2022-06-23 DIAGNOSIS — R91.1 LUNG NODULE: Primary | ICD-10-CM

## 2022-06-23 NOTE — TELEPHONE ENCOUNTER
Pt called asking if Dr Ezekiel Villalobos knows if there is a cancer support group in the [facility]?   Please advise: [phone]

## 2022-06-23 NOTE — PROGRESS NOTES
MSW received a referral for this pt stating she was seeking a support group  Outreach was made to the pt this day and she was open and receptive of this call  Pt shared her medical history and how she is hoping to have surgery to remove her cancer  Pt states she does not want to have chemo or radiation, if that were offered to her  Pt has had many family members who have had cancer and went through treatment, which the pt witnessed and has led to her decision  At this time, she reports that her Doctor hs informed her that she will only require surgery  Pt is seeking a support group for herself and information was provided for the [facility]  Pt drives and she states that she will be able to go to that location  Pt was pleasant and conversational   She lives alone and has a son in [place]  Pt admits that her support system is limited, by her own choosing  She has a male friend who helps her with transportation to appointments  Pt reports that she has no other social work needs at this time  Contact information was provided and MSW encouraged her to call as needed  Emotional support offered throughout the call  Pt called back this day to state that she has an appointment on Monday at the [facility]

## 2022-06-27 ENCOUNTER — DOCUMENTATION (OUTPATIENT)
Dept: CARDIAC SURGERY | Facility: CLINIC | Age: 77
End: 2022-06-27

## 2022-06-27 NOTE — PROGRESS NOTES
THORACIC ONCOLOGY MULTIDISCIPLINARY CASE REVIEW    DATE:  6/27/2022    PRESENTING DOCTOR: Dr Lyndon Sicard    DIAGNOSIS:  Non-Small Cell Lung Carcinoma/Adenocarcinoma  STAGING: Clinical Stage I    Yue Juarez is a 77 y o  female who was presented at the Thoracic Oncology Multidisciplinary Tumor Conference today  Patient has a history of pulmonary embolus and follows with pulmonology  She's a non-smoker  Patient functionally out performs PFT results  PHYSICIAN RECOMMENDED PLAN:  Pending repeat PFT's patient may need CPET  Consider Consult with Thoracic Surgery for possible wedge resection/lymph node biopsy versus  Consult with Radiation oncology for SBRT or SBRT with Pembro (Phase III trial)    Imaging reviewed:   5/23/2022- PET/CT- 1 9 x 1 5 cm LLL lung mass with an SUV of 2 4  Mild asymmetric FDG activity involving the soft tissues about the left posterior lateral aspect of the trachea towards the level of the vocal cords  Pathology reviewed: 6/15/2022- IR biopsy Left Lower Lobe  Adenocarcinoma positive for CK7, Negative for CK20    PFT's reviewed: 10/21- FEV1-62%, DLCO-37%  6/30/2022- PFT's    Future imaging: None discussed    Referrals: Thoracic Surgery vs SBRT  Pending repeat PFT's    Procedures: To be determined        Team agreed to plan  NCCN guidelines were readily available for review at this discussion    The final treatment plan will be left to the discretion of the patient and the treating physician  DISCLAIMERS:  TO THE TREATING PHYSICIAN:  This conference is a meeting of clinicians from various specialty areas who evaluate and discuss patients for whom a multidisciplinary treatment approach is being considered  Please note that the above opinion was a consensus of the conference attendees and is intended only to assist in quality care of the discussed patient    The responsibility for follow up on the input given during the conference, along with any final decisions regarding plan of care, is that of the patient and the patient's provider  Accordingly, appointments have only been recommended based on this information and have NOT been scheduled unless otherwise noted  TO THE PATIENT:  This summary is a brief record of major aspects of your cancer treatment  You may choose to share a copy with any of your doctors or nurses  However, this is not a detailed or comprehensive record of your care

## 2022-06-28 DIAGNOSIS — C34.90 ADENOCARCINOMA OF LUNG, UNSPECIFIED LATERALITY (HCC): Primary | ICD-10-CM

## 2022-06-28 NOTE — PROGRESS NOTES
Pt called to discuss this meeting w/ Dr Aleksandra Buchanan and she would also like to know what stage cancer she has    Please advise: [phone]

## 2022-06-29 ENCOUNTER — TELEPHONE (OUTPATIENT)
Dept: CARDIAC SURGERY | Facility: CLINIC | Age: 77
End: 2022-06-29

## 2022-06-29 ENCOUNTER — PATIENT OUTREACH (OUTPATIENT)
Dept: HEMATOLOGY ONCOLOGY | Facility: CLINIC | Age: 77
End: 2022-06-29

## 2022-06-29 NOTE — PROGRESS NOTES
MSW received a call from the pt this day as she wanted to share that she had her test results and will be having surgery  Pt was asking about care in the home after her surgery  Pt states that she will be in the hospital for approximately 5 days post surgery  MSW explained that she may qualify for VNA services and this will be discussed with her when she is in the hospital   Pt does not yet have a date for her surgery  Pt has a significant other but reports that he is not always helpful  Pt expressed concern if she is not well enough to do her grocery shopping  MSW encouraged the pt to call after her surgery if she has needs for shopping and a volunteer can be requested  Pt verbalized understanding  Pt shared that she has gone to a support group through the [facility] and found this to be helpful  She plans on going back again  Pt remains positive at this time  Emotional support offered

## 2022-06-29 NOTE — TELEPHONE ENCOUNTER
Thoracic SX Intake Form   Patient Details   Tati Carvalho     1945     Reason For Appointment   Who is Calling? Patient   If not patient, Name? Who is the Referring Doctor? Dr Mane Nj surgeon is Patient referred to? Maritza   What is the diagnosis? Adenocarcinoma of lung   Has this diagnosis been confirmed by    imaging/ biopsy/surgery? If yes, what is the date it was done? Yes 6/15   Biopsy done at [place] [id number]? If not, where was it done? yes   Was imaging done, and was it done at [facility]? If not, where was it done? yes   Scheduling Information     What is the best call back number?      [phone]   Miscellaneous Information

## 2022-06-29 NOTE — PROGRESS NOTES
Patient called, she wanted to let Cory Hutchison know she is scheduled with Dr Bliss for 7/13   Thank you

## 2022-06-30 ENCOUNTER — HOSPITAL ENCOUNTER (OUTPATIENT)
Dept: PULMONOLOGY | Facility: HOSPITAL | Age: 77
Discharge: HOME | End: 2022-06-30
Attending: INTERNAL MEDICINE
Payer: COMMERCIAL

## 2022-06-30 DIAGNOSIS — C34.92 SQUAMOUS CARCINOMA OF LUNG, LEFT (HCC): ICD-10-CM

## 2022-06-30 PROCEDURE — 94060 EVALUATION OF WHEEZING: CPT

## 2022-06-30 PROCEDURE — 94060 EVALUATION OF WHEEZING: CPT | Performed by: INTERNAL MEDICINE

## 2022-06-30 PROCEDURE — 94729 DIFFUSING CAPACITY: CPT | Performed by: INTERNAL MEDICINE

## 2022-06-30 PROCEDURE — 94726 PLETHYSMOGRAPHY LUNG VOLUMES: CPT

## 2022-06-30 PROCEDURE — 94726 PLETHYSMOGRAPHY LUNG VOLUMES: CPT | Performed by: INTERNAL MEDICINE

## 2022-06-30 PROCEDURE — 94760 N-INVAS EAR/PLS OXIMETRY 1: CPT

## 2022-06-30 PROCEDURE — 94729 DIFFUSING CAPACITY: CPT

## 2022-06-30 RX ORDER — ALBUTEROL SULFATE 2.5 MG/3ML
2.5 SOLUTION RESPIRATORY (INHALATION) ONCE
Status: COMPLETED | OUTPATIENT
Start: 2022-06-30 | End: 2022-06-30

## 2022-06-30 RX ADMIN — ALBUTEROL SULFATE 2.5 MG: 2.5 SOLUTION RESPIRATORY (INHALATION) at 07:28

## 2022-07-01 ENCOUNTER — TELEPHONE (OUTPATIENT)
Dept: PULMONOLOGY | Facility: CLINIC | Age: 77
End: 2022-07-01

## 2022-07-01 NOTE — TELEPHONE ENCOUNTER
Pt calling to let Dr Aleksandra Buchanan know that she had her PFTs yesterday and would like him to look at it when resulted

## 2022-07-12 ENCOUNTER — TELEPHONE (OUTPATIENT)
Dept: GYNECOLOGIC ONCOLOGY | Facility: CLINIC | Age: 77
End: 2022-07-12

## 2022-07-12 NOTE — TELEPHONE ENCOUNTER
 Hello, can I please speak to (patient name) this is (enter your name here) calling from [facility]) to remind you of your appointment on (date and time) at (location)  I am calling to review our no-show/cancelation policy and masking policy  Do you have a few minutes? We ask that you come at least 15 minutes early for your appointment to complete all paperwork  However, If you are up to 20 minutes late for your appointment, we may need to reschedule you  Any lateness to an appointment may result in an shorted visit, for our providers to offer you the most out of your consult, please arrive early  We require at least 24-hour notice for cancelations and if you miss your appointment 3 times, we may unfortunately not be able to reschedule any future visits  We ask that you please call our office in the event you are feeling ill as we may need to reschedule your appointment  You are allowed to bring only one visitor with you to your appointment, if your visitor is not feeling well we ask that you don't bring them  Our current masking policy is: if you are vaccinated masking is optional, if you are unvaccinated masking is required  We look forward to seeing you at your upcoming appointment!

## 2022-07-13 ENCOUNTER — OFFICE VISIT (OUTPATIENT)
Dept: CARDIAC SURGERY | Facility: CLINIC | Age: 77
End: 2022-07-13
Payer: COMMERCIAL

## 2022-07-13 ENCOUNTER — DOCUMENTATION (OUTPATIENT)
Dept: CARDIAC SURGERY | Facility: CLINIC | Age: 77
End: 2022-07-13

## 2022-07-13 VITALS
BODY MASS INDEX: 26.46 KG/M2 | OXYGEN SATURATION: 96 % | HEART RATE: 82 BPM | TEMPERATURE: 98.4 F | HEIGHT: 64 IN | DIASTOLIC BLOOD PRESSURE: 76 MMHG | RESPIRATION RATE: 17 BRPM | WEIGHT: 155 LBS | SYSTOLIC BLOOD PRESSURE: 118 MMHG

## 2022-07-13 DIAGNOSIS — C34.90 ADENOCARCINOMA OF LUNG, UNSPECIFIED LATERALITY (HCC): ICD-10-CM

## 2022-07-13 PROCEDURE — 99205 OFFICE O/P NEW HI 60 MIN: CPT | Performed by: THORACIC SURGERY (CARDIOTHORACIC VASCULAR SURGERY)

## 2022-07-13 RX ORDER — CEFAZOLIN SODIUM 1 G/50ML
1000 SOLUTION INTRAVENOUS ONCE
Status: CANCELLED | OUTPATIENT
Start: 2022-07-13 | End: 2022-07-13

## 2022-07-13 RX ORDER — GABAPENTIN 300 MG/1
600 CAPSULE ORAL ONCE
Status: CANCELLED | OUTPATIENT
Start: 2022-07-13 | End: 2022-07-13

## 2022-07-13 RX ORDER — HEPARIN SODIUM 5000 [USP'U]/ML
5000 INJECTION, SOLUTION INTRAVENOUS; SUBCUTANEOUS
Status: CANCELLED | OUTPATIENT
Start: 2022-07-13 | End: 2022-07-14

## 2022-07-13 RX ORDER — ACETAMINOPHEN 325 MG/1
975 TABLET ORAL ONCE
Status: CANCELLED | OUTPATIENT
Start: 2022-07-13 | End: 2022-07-13

## 2022-07-13 NOTE — H&P (VIEW-ONLY)
Thoracic Consult  Assessment/Plan:    Adenocarcinoma of lung [facility])  We had a discussion with Mr  Shahzad Carney after personally reviewing her medical history and imaging  She has a left lower lobe lung nodule that was PET avid and biopsy proven adenocarcinoma of the lung  There was no evidence of metastatic disease, and therefore it is a clinical stage I lung cancer  Her PFT's are severely decreased, but she is asymptomatic  Her options include SBRT or a flexible bronchoscopy, left thoracoscopic robotic assisted lower lobe wedge resection with MLND  She is not a candidate for a lobectomy, secondary to her PFT's  The patient would like to proceed with surgery and the procedure, possible risks, and post operative course were explained and all questions were answered  She will undergo blood work and an EKG prior to surgery  She can stay on her asa 81 mg         Diagnoses and all orders for this visit:    Adenocarcinoma of lung, unspecified laterality ([facility] [address] )  -     Ambulatory Referral to Thoracic Surgery  -     Case request operating room: BRONCHOSCOPY FLEXIBLE, RESECTION,LUNG WEDGE THORACOSCOPIC W/ ROBOTICS; Standing  -     EKG 12 lead; Future  -     Type and screen; Future  -     APTT; Future  -     CBC and Platelet; Future  -     Basic metabolic panel; Future  -     Protime-INR; Future  -     Case request operating room: BRONCHOSCOPY FLEXIBLE, RESECTION,LUNG WEDGE THORACOSCOPIC W/ ROBOTICS    Other orders  -     Diet NPO; Sips with meds; Standing  -     Nursing communication Please give pre-op Carbohydrate drink to patient 2-4 hours prior to surgery; Standing  -     Void on call to OR; Standing  -     Insert peripheral IV;  Standing  -     Place sequential compression device; Standing  -     acetaminophen (TYLENOL) tablet 975 mg  -     gabapentin (NEURONTIN) capsule 600 mg  -     heparin (porcine) subcutaneous injection 5,000 Units  -     ceFAZolin (ANCEF) IVPB (premix in dextrose) 1,000 mg 50 mL             Thoracic History   Diagnosis: Lung adenocarcinoma of the left lower lobe   Procedures/Surgeries:    Pathology:    Adjuvant Therapy:           Patient ID: Janelle Espinoza is a 77 y o  female  ECOG  0     HPI    Ms  Carlita Cobb is a 78 yo female with a history of hypothyroidism secondary to partial thyroidectomy but no evidence of carcinoma, DORIS, DVT/ PE, and depression who was referred by Dr Che Rico for a left lower lobe lung nodule  A CT from 5/8/22 revealed a 1 4 x 1 8 cm left lower lobe lung nodule  PET from 5/23/22 revealed an associated SUV of 2 4 with the 1 9 x 1 5 cm nodule  It also revealed mild activity adjacent to the left posterior aspect of the trachea near level of the vocal cords and thyroid cartilage with a SUV of 4 1, may be physiologic but radiology is recommending a neck MRI or direct visualization  IR biopsy of this nodule was performed on 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  PFT's from 6/30/22 revealed a FVC of 0 96L, 46%, FEV1 of 0 96L, 46%, and a DLCO of 50% predicted  She presents today for further discussion  She had multiple left DVT's with one PE  In [place], she had a "mini seizure" and on her workup, there was some scarring seen on some imaging  She was going to the gym, but has stopped recently  She denies fever, chills, shortness of breath, hemoptysis, cough, weight loss  She is a lifetime non smoker  She takes one baby aspirin a day, but no other AC  She is COVID vaccinated         The following portions of the patient's history were reviewed and updated as appropriate: allergies, current medications, past family history, past medical history, past social history, past surgical history and problem list     Past Medical History:   Diagnosis Date    Adenocarcinoma of lung ([facility] [address] ) 7/13/2022    Arthritis     Borderline diabetes     diet controlled    Colon polyp     Depression     Disease of thyroid gland     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    DVT (deep venous thrombosis) (HCC)     hx- left leg    Pulmonary emboli (HCC)     during pregnancy    Rash     arms on occasion, cause unknown      Past Surgical History:   Procedure Laterality Date    COLONOSCOPY      DILATION AND CURETTAGE, DIAGNOSTIC / THERAPEUTIC      IR BIOPSY LUNG  6/15/2022    THYROIDECTOMY, PARTIAL      2020      Family History   Problem Relation Age of Onset    No Known Problems Mother     Cancer Sister [de-identified]    No Known Problems Sister     Asthma Daughter     No Known Problems Son     No Known Problems Maternal Aunt     No Known Problems Maternal Aunt     No Known Problems Paternal Aunt     No Known Problems Paternal Aunt     Breast cancer Other 59      Social History     Socioeconomic History    Marital status:       Spouse name: Not on file    Number of children: Not on file    Years of education: Not on file    Highest education level: Not on file   Occupational History    Not on file   Tobacco Use    Smoking status: Never Smoker    Smokeless tobacco: Never Used   Vaping Use    Vaping Use: Never used   Substance and Sexual Activity    Alcohol use: Yes     Comment: social rare    Drug use: Never    Sexual activity: Yes     Partners: Male   Other Topics Concern    Not on file   Social History Narrative    Not on file     Social Determinants of Health     Financial Resource Strain: Not on file   Food Insecurity: Not on file   Transportation Needs: Not on file   Physical Activity: Not on file   Stress: Not on file   Social Connections: Not on file   Intimate Partner Violence: Not on file   Housing Stability: Not on file      Allergies   Allergen Reactions    Crab (Diagnostic) - Food Allergy Itching and Rash    Iv Contrast [Iodinated Diagnostic Agents] Rash    Penicillins Rash     Current Outpatient Medications on File Prior to Visit   Medication Sig Dispense Refill    aspirin 81 mg chewable tablet Chew 81 mg daily      azithromycin (ZITHROMAX) 250 mg tablet take 2 tablets by mouth on day 1 IN ONE DOSE then 1 tablet on days 2 through 5      cholecalciferol (VITAMIN D3) 400 units tablet Take 400 Units by mouth daily        cyanocobalamin (VITAMIN B-12) 100 mcg tablet Take by mouth daily Pt unsure dose        Diclofenac Sodium (VOLTAREN) 1 %        diphenhydrAMINE (BENADRYL) 50 MG tablet 50 mgm benadryl one  Hour prior to CT scan 1 tablet 0    levothyroxine 75 mcg tablet Take 75 mcg by mouth daily      loratadine (CLARITIN) 10 mg tablet Take 10 mg by mouth as needed for allergies      magnesium gluconate (MAGONATE) 500 MG tablet Take 500 mg by mouth 2 (two) times a day        methylPREDNISolone (MEDROL) 16 mg tablet TAKE 2 TABLETS BY MOUTH 12 HOURS PRIORTO CONTRAST AND 2 TABLETS 2 HOURS PRIOR TO CONTRAST      methylPREDNISolone (MEDROL) 32 MG tablet 32 mgm 12 hours prior to contrast and 32 mgm 2 hours prior to contrast 2 tablet 0    Omega-3 Fatty Acids (fish oil) 1,000 mg Take 1,000 mg by mouth daily Pt unsure dose        RA Allergy Relief 25 MG capsule TAKE 2 TABLETS BY MOUTH 1 HOUR PRIOR TO CAT SCAN      vitamin E 100 UNIT capsule Take 100 Units by mouth daily Pt unsure         No current facility-administered medications on file prior to visit  Review of Systems   Constitutional: Negative for chills, fever and unexpected weight change  HENT: Negative for sore throat, trouble swallowing and voice change  Eyes: Negative for visual disturbance  Respiratory: Negative for cough and shortness of breath  Cardiovascular: Negative for chest pain and palpitations  Gastrointestinal: Negative for abdominal pain, nausea and vomiting  Musculoskeletal: Negative for arthralgias, back pain and neck pain  Skin: Negative for color change and rash  Neurological: Negative for dizziness, light-headedness and headaches  Psychiatric/Behavioral: Negative for agitation, behavioral problems and confusion  All other systems reviewed and are negative          Objective:   Physical Exam  Vitals reviewed  Constitutional:       General: She is not in acute distress  Appearance: Normal appearance  She is well-developed  She is not diaphoretic  HENT:      Head: Normocephalic and atraumatic  Eyes:      General: No scleral icterus  Extraocular Movements: Extraocular movements intact  Neck:      Trachea: No tracheal deviation  Cardiovascular:      Rate and Rhythm: Normal rate and regular rhythm  Pulses: Normal pulses  Heart sounds: Normal heart sounds  No murmur heard  Pulmonary:      Effort: Pulmonary effort is normal  No respiratory distress  Breath sounds: Normal breath sounds  No wheezing  Abdominal:      General: Bowel sounds are normal  There is no distension  Palpations: Abdomen is soft  Musculoskeletal:         General: Normal range of motion  Cervical back: Normal range of motion and neck supple  Right lower leg: No edema  Left lower leg: No edema  Lymphadenopathy:      Cervical: No cervical adenopathy  Skin:     General: Skin is warm and dry  Findings: No erythema  Neurological:      Mental Status: She is alert and oriented to person, place, and time  Cranial Nerves: No cranial nerve deficit  Gait: Gait normal    Psychiatric:         Mood and Affect: Mood normal          Behavior: Behavior normal          Thought Content: Thought content normal      /76   Pulse 82   Temp 98 4 °F (36 9 °C)   Resp 17   Ht 5' 4" (1 626 m)   Wt 70 3 kg (155 lb)   SpO2 96%   BMI 26 61 kg/m²        CT chest without contrast    Result Date: 5/10/2022  Narrative CT CHEST WITHOUT IV CONTRAST INDICATION:   R93 89: Abnormal findings on diagnostic imaging of other specified body structures  COMPARISON:  2/11/2022 TECHNIQUE: CT examination of the chest was performed without intravenous contrast  Axial, sagittal, and coronal 2D reformatted images were created from the source data and submitted for interpretation   Radiation dose length product (DLP) for this visit:  149 mGy-cm   This examination, like all CT scans performed in the [facility], was performed utilizing techniques to minimize radiation dose exposure, including the use of iterative reconstruction and automated exposure control  FINDINGS: LUNGS:  There is a stable focal opacity in the left lower lobe measuring 1 4 x 1 8 cm  Lungs are otherwise clear though evaluation is degraded by respiratory motion  There is no tracheal or endobronchial lesion  PLEURA:  Unremarkable  HEART/GREAT VESSELS: Heart is unremarkable for patient's age  No thoracic aortic aneurysm  MEDIASTINUM AND CRISTIANO:  Unremarkable  CHEST WALL AND LOWER NECK:  Unremarkable  VISUALIZED STRUCTURES IN THE UPPER ABDOMEN:  Unremarkable  OSSEOUS STRUCTURES:  No acute fracture or destructive osseous lesion  Impression Degraded by respiratory motion Stable focal opacity in the peripheral left lower lobe  Neoplasm is not excluded and further evaluation with PET/CT or biopsy is recommended  I personally discussed this study with Leonel Tolliver via [employer] on 5/10/2022 at 12:09 PM  Workstation performed: [id number]        NM PET CT skull base to mid thigh    Result Date: 5/24/2022  Narrative PET/CT SCAN INDICATION:  R91 1: Solitary pulmonary nodule   The following clinical information was obtained directly from [facility]: h/o abnormal CT - stable focal opacity in peripheral Lt LL  Evaluate for malignancy  Pt is a lifelong non-smoker  Prior partial thyroidectomy  MODIFIER: PI COMPARISON: CT of chest dated 5/8/2022 and CT of abdomen and pelvis dated 1/17/2018 CELL TYPE:  Not applicable TECHNIQUE:   8 6 mCi F-18-FDG administered IV  Multiplanar attenuation corrected and non attenuation corrected PET images were acquired 60 minutes post injection  Contiguous, low dose, axial CT sections were obtained from the skull base through the femurs   Intravenous contrast material was not utilized    This examination, like all CT scans performed in the [place], was performed utilizing techniques to minimize radiation dose exposure, including the use of iterative reconstruction and automated exposure control  Fasting serum glucose: 100 mg/dl FINDINGS: VISUALIZED BRAIN:   No acute abnormalities are seen  HEAD/NECK:   Best seen on image 55, there is mild asymmetric FDG activity involving the soft tissues adjacent to the left posterior lateral aspect of the trachea towards the level of the vocal cords and thyroid cartilage with max SUV of 4 1; it is unclear whether or not findings are related to physiologic tracer activity but given mild asymmetry, correlation with direct visualization and possibly contrast-enhanced MRI of the neck is recommended as clinically indicated  CT images: Intracranial atherosclerotic calcification is noted  Complete opacification of the right sphenoid sinus with mild mucosal thickening involving the left sphenoid sinus  CHEST:   On image 100 there is mild focal FDG activity associated with patient's known 1 9 x 1 5 cm subpleural left lower lobe lung mass with max SUV of 2 4 (for reference, max SUV of the mediastinal blood pool is 2 4)  Malignancy is the diagnosis of exclusion and further evaluation with tissue sampling is recommended  CT images: Atherosclerotic vascular calcifications including those of the coronary arteries are noted  The lung fields are better evaluated on recent dedicated CT of chest  ABDOMEN:   No FDG avid soft tissue lesions are seen  CT images: Atherosclerotic vascular calcifications are noted  Nonspecific mural thickening of the proximal to mid gastric wall, possibly related to underdistention with underlying gastric mucosal process not excluded  PELVIS: No FDG avid soft tissue lesions are seen  CT images: Unremarkable  OSSEOUS STRUCTURES: No FDG avid lesions are seen  CT images: Mild apex right scoliosis to the lumbar spine   Degenerative changes are noted involving the spine  Impression 1  Mild FDG activity associated with patient's known 1 9 cm subpleural left lower lobe lung mass for which malignancy of low metabolic activity is the diagnosis of exclusion  Further evaluation with tissue sampling is recommended  2   Mild asymmetric FDG activity involving the soft tissues about the left posterior lateral aspect of the trachea towards the level of the vocal cords, see discussion above  While findings could reflect physiologic tracer activity, given mild asymmetry, consider correlation with direct visualization and contrast-enhanced MRI of the neck as clinically indicated  Please see above for details and additional findings  The study was marked in EPIC for significant notification   Workstation performed: [id number]

## 2022-07-13 NOTE — PROGRESS NOTES
Thoracic Consult  Assessment/Plan:    Adenocarcinoma of lung [facility])  We had a discussion with Mr  Mary Jo Triplett after personally reviewing her medical history and imaging  She has a left lower lobe lung nodule that was PET avid and biopsy proven adenocarcinoma of the lung  There was no evidence of metastatic disease, and therefore it is a clinical stage I lung cancer  Her PFT's are severely decreased, but she is asymptomatic  Her options include SBRT or a flexible bronchoscopy, left thoracoscopic robotic assisted lower lobe wedge resection with MLND  She is not a candidate for a lobectomy, secondary to her PFT's  The patient would like to proceed with surgery and the procedure, possible risks, and post operative course were explained and all questions were answered  She will undergo blood work and an EKG prior to surgery  She can stay on her asa 81 mg         Diagnoses and all orders for this visit:    Adenocarcinoma of lung, unspecified laterality ([facility] [address] )  -     Ambulatory Referral to Thoracic Surgery  -     Case request operating room: BRONCHOSCOPY FLEXIBLE, RESECTION,LUNG WEDGE THORACOSCOPIC W/ ROBOTICS; Standing  -     EKG 12 lead; Future  -     Type and screen; Future  -     APTT; Future  -     CBC and Platelet; Future  -     Basic metabolic panel; Future  -     Protime-INR; Future  -     Case request operating room: BRONCHOSCOPY FLEXIBLE, RESECTION,LUNG WEDGE THORACOSCOPIC W/ ROBOTICS    Other orders  -     Diet NPO; Sips with meds; Standing  -     Nursing communication Please give pre-op Carbohydrate drink to patient 2-4 hours prior to surgery; Standing  -     Void on call to OR; Standing  -     Insert peripheral IV;  Standing  -     Place sequential compression device; Standing  -     acetaminophen (TYLENOL) tablet 975 mg  -     gabapentin (NEURONTIN) capsule 600 mg  -     heparin (porcine) subcutaneous injection 5,000 Units  -     ceFAZolin (ANCEF) IVPB (premix in dextrose) 1,000 mg 50 mL             Thoracic History   Diagnosis: Lung adenocarcinoma of the left lower lobe   Procedures/Surgeries:    Pathology:    Adjuvant Therapy:           Patient ID: Makayla Hidalgo is a 77 y o  female  ECOG  0     HPI    Ms  Jamia Lopez is a 78 yo female with a history of hypothyroidism secondary to partial thyroidectomy but no evidence of carcinoma, DORIS, DVT/ PE, and depression who was referred by Dr Luis Bronson for a left lower lobe lung nodule  A CT from 5/8/22 revealed a 1 4 x 1 8 cm left lower lobe lung nodule  PET from 5/23/22 revealed an associated SUV of 2 4 with the 1 9 x 1 5 cm nodule  It also revealed mild activity adjacent to the left posterior aspect of the trachea near level of the vocal cords and thyroid cartilage with a SUV of 4 1, may be physiologic but radiology is recommending a neck MRI or direct visualization  IR biopsy of this nodule was performed on 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  PFT's from 6/30/22 revealed a FVC of 0 96L, 46%, FEV1 of 0 96L, 46%, and a DLCO of 50% predicted  She presents today for further discussion  She had multiple left DVT's with one PE  In [place], she had a "mini seizure" and on her workup, there was some scarring seen on some imaging  She was going to the gym, but has stopped recently  She denies fever, chills, shortness of breath, hemoptysis, cough, weight loss  She is a lifetime non smoker  She takes one baby aspirin a day, but no other AC  She is COVID vaccinated         The following portions of the patient's history were reviewed and updated as appropriate: allergies, current medications, past family history, past medical history, past social history, past surgical history and problem list     Past Medical History:   Diagnosis Date    Adenocarcinoma of lung ([facility] [address] ) 7/13/2022    Arthritis     Borderline diabetes     diet controlled    Colon polyp     Depression     Disease of thyroid gland     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    DVT (deep venous thrombosis) (HCC)     hx- left leg    Pulmonary emboli (HCC)     during pregnancy    Rash     arms on occasion, cause unknown      Past Surgical History:   Procedure Laterality Date    COLONOSCOPY      DILATION AND CURETTAGE, DIAGNOSTIC / THERAPEUTIC      IR BIOPSY LUNG  6/15/2022    THYROIDECTOMY, PARTIAL      2020      Family History   Problem Relation Age of Onset    No Known Problems Mother     Cancer Sister [de-identified]    No Known Problems Sister     Asthma Daughter     No Known Problems Son     No Known Problems Maternal Aunt     No Known Problems Maternal Aunt     No Known Problems Paternal Aunt     No Known Problems Paternal Aunt     Breast cancer Other 59      Social History     Socioeconomic History    Marital status:       Spouse name: Not on file    Number of children: Not on file    Years of education: Not on file    Highest education level: Not on file   Occupational History    Not on file   Tobacco Use    Smoking status: Never Smoker    Smokeless tobacco: Never Used   Vaping Use    Vaping Use: Never used   Substance and Sexual Activity    Alcohol use: Yes     Comment: social rare    Drug use: Never    Sexual activity: Yes     Partners: Male   Other Topics Concern    Not on file   Social History Narrative    Not on file     Social Determinants of Health     Financial Resource Strain: Not on file   Food Insecurity: Not on file   Transportation Needs: Not on file   Physical Activity: Not on file   Stress: Not on file   Social Connections: Not on file   Intimate Partner Violence: Not on file   Housing Stability: Not on file      Allergies   Allergen Reactions    Crab (Diagnostic) - Food Allergy Itching and Rash    Iv Contrast [Iodinated Diagnostic Agents] Rash    Penicillins Rash     Current Outpatient Medications on File Prior to Visit   Medication Sig Dispense Refill    aspirin 81 mg chewable tablet Chew 81 mg daily      azithromycin (ZITHROMAX) 250 mg tablet take 2 tablets by mouth on day 1 IN ONE DOSE then 1 tablet on days 2 through 5      cholecalciferol (VITAMIN D3) 400 units tablet Take 400 Units by mouth daily        cyanocobalamin (VITAMIN B-12) 100 mcg tablet Take by mouth daily Pt unsure dose        Diclofenac Sodium (VOLTAREN) 1 %        diphenhydrAMINE (BENADRYL) 50 MG tablet 50 mgm benadryl one  Hour prior to CT scan 1 tablet 0    levothyroxine 75 mcg tablet Take 75 mcg by mouth daily      loratadine (CLARITIN) 10 mg tablet Take 10 mg by mouth as needed for allergies      magnesium gluconate (MAGONATE) 500 MG tablet Take 500 mg by mouth 2 (two) times a day        methylPREDNISolone (MEDROL) 16 mg tablet TAKE 2 TABLETS BY MOUTH 12 HOURS PRIORTO CONTRAST AND 2 TABLETS 2 HOURS PRIOR TO CONTRAST      methylPREDNISolone (MEDROL) 32 MG tablet 32 mgm 12 hours prior to contrast and 32 mgm 2 hours prior to contrast 2 tablet 0    Omega-3 Fatty Acids (fish oil) 1,000 mg Take 1,000 mg by mouth daily Pt unsure dose        RA Allergy Relief 25 MG capsule TAKE 2 TABLETS BY MOUTH 1 HOUR PRIOR TO CAT SCAN      vitamin E 100 UNIT capsule Take 100 Units by mouth daily Pt unsure         No current facility-administered medications on file prior to visit  Review of Systems   Constitutional: Negative for chills, fever and unexpected weight change  HENT: Negative for sore throat, trouble swallowing and voice change  Eyes: Negative for visual disturbance  Respiratory: Negative for cough and shortness of breath  Cardiovascular: Negative for chest pain and palpitations  Gastrointestinal: Negative for abdominal pain, nausea and vomiting  Musculoskeletal: Negative for arthralgias, back pain and neck pain  Skin: Negative for color change and rash  Neurological: Negative for dizziness, light-headedness and headaches  Psychiatric/Behavioral: Negative for agitation, behavioral problems and confusion  All other systems reviewed and are negative          Objective:   Physical Exam  Vitals reviewed  Constitutional:       General: She is not in acute distress  Appearance: Normal appearance  She is well-developed  She is not diaphoretic  HENT:      Head: Normocephalic and atraumatic  Eyes:      General: No scleral icterus  Extraocular Movements: Extraocular movements intact  Neck:      Trachea: No tracheal deviation  Cardiovascular:      Rate and Rhythm: Normal rate and regular rhythm  Pulses: Normal pulses  Heart sounds: Normal heart sounds  No murmur heard  Pulmonary:      Effort: Pulmonary effort is normal  No respiratory distress  Breath sounds: Normal breath sounds  No wheezing  Abdominal:      General: Bowel sounds are normal  There is no distension  Palpations: Abdomen is soft  Musculoskeletal:         General: Normal range of motion  Cervical back: Normal range of motion and neck supple  Right lower leg: No edema  Left lower leg: No edema  Lymphadenopathy:      Cervical: No cervical adenopathy  Skin:     General: Skin is warm and dry  Findings: No erythema  Neurological:      Mental Status: She is alert and oriented to person, place, and time  Cranial Nerves: No cranial nerve deficit  Gait: Gait normal    Psychiatric:         Mood and Affect: Mood normal          Behavior: Behavior normal          Thought Content: Thought content normal      /76   Pulse 82   Temp 98 4 °F (36 9 °C)   Resp 17   Ht 5' 4" (1 626 m)   Wt 70 3 kg (155 lb)   SpO2 96%   BMI 26 61 kg/m²        CT chest without contrast    Result Date: 5/10/2022  Narrative CT CHEST WITHOUT IV CONTRAST INDICATION:   R93 89: Abnormal findings on diagnostic imaging of other specified body structures  COMPARISON:  2/11/2022 TECHNIQUE: CT examination of the chest was performed without intravenous contrast  Axial, sagittal, and coronal 2D reformatted images were created from the source data and submitted for interpretation   Radiation dose length product (DLP) for this visit:  149 mGy-cm   This examination, like all CT scans performed in the [facility], was performed utilizing techniques to minimize radiation dose exposure, including the use of iterative reconstruction and automated exposure control  FINDINGS: LUNGS:  There is a stable focal opacity in the left lower lobe measuring 1 4 x 1 8 cm  Lungs are otherwise clear though evaluation is degraded by respiratory motion  There is no tracheal or endobronchial lesion  PLEURA:  Unremarkable  HEART/GREAT VESSELS: Heart is unremarkable for patient's age  No thoracic aortic aneurysm  MEDIASTINUM AND CRISTIANO:  Unremarkable  CHEST WALL AND LOWER NECK:  Unremarkable  VISUALIZED STRUCTURES IN THE UPPER ABDOMEN:  Unremarkable  OSSEOUS STRUCTURES:  No acute fracture or destructive osseous lesion  Impression Degraded by respiratory motion Stable focal opacity in the peripheral left lower lobe  Neoplasm is not excluded and further evaluation with PET/CT or biopsy is recommended  I personally discussed this study with Leonel Tolliver via [employer] on 5/10/2022 at 12:09 PM  Workstation performed: [id number]        NM PET CT skull base to mid thigh    Result Date: 5/24/2022  Narrative PET/CT SCAN INDICATION:  R91 1: Solitary pulmonary nodule   The following clinical information was obtained directly from [facility]: h/o abnormal CT - stable focal opacity in peripheral Lt LL  Evaluate for malignancy  Pt is a lifelong non-smoker  Prior partial thyroidectomy  MODIFIER: PI COMPARISON: CT of chest dated 5/8/2022 and CT of abdomen and pelvis dated 1/17/2018 CELL TYPE:  Not applicable TECHNIQUE:   8 6 mCi F-18-FDG administered IV  Multiplanar attenuation corrected and non attenuation corrected PET images were acquired 60 minutes post injection  Contiguous, low dose, axial CT sections were obtained from the skull base through the femurs   Intravenous contrast material was not utilized    This examination, like all CT scans performed in the [facility], was performed utilizing techniques to minimize radiation dose exposure, including the use of iterative reconstruction and automated exposure control  Fasting serum glucose: 100 mg/dl FINDINGS: VISUALIZED BRAIN:   No acute abnormalities are seen  HEAD/NECK:   Best seen on image 55, there is mild asymmetric FDG activity involving the soft tissues adjacent to the left posterior lateral aspect of the trachea towards the level of the vocal cords and thyroid cartilage with max SUV of 4 1; it is unclear whether or not findings are related to physiologic tracer activity but given mild asymmetry, correlation with direct visualization and possibly contrast-enhanced MRI of the neck is recommended as clinically indicated  CT images: Intracranial atherosclerotic calcification is noted  Complete opacification of the right sphenoid sinus with mild mucosal thickening involving the left sphenoid sinus  CHEST:   On image 100 there is mild focal FDG activity associated with patient's known 1 9 x 1 5 cm subpleural left lower lobe lung mass with max SUV of 2 4 (for reference, max SUV of the mediastinal blood pool is 2 4)  Malignancy is the diagnosis of exclusion and further evaluation with tissue sampling is recommended  CT images: Atherosclerotic vascular calcifications including those of the coronary arteries are noted  The lung fields are better evaluated on recent dedicated CT of chest  ABDOMEN:   No FDG avid soft tissue lesions are seen  CT images: Atherosclerotic vascular calcifications are noted  Nonspecific mural thickening of the proximal to mid gastric wall, possibly related to underdistention with underlying gastric mucosal process not excluded  PELVIS: No FDG avid soft tissue lesions are seen  CT images: Unremarkable  OSSEOUS STRUCTURES: No FDG avid lesions are seen  CT images: Mild apex right scoliosis to the lumbar spine   Degenerative changes are noted involving the spine  Impression 1  Mild FDG activity associated with patient's known 1 9 cm subpleural left lower lobe lung mass for which malignancy of low metabolic activity is the diagnosis of exclusion  Further evaluation with tissue sampling is recommended  2   Mild asymmetric FDG activity involving the soft tissues about the left posterior lateral aspect of the trachea towards the level of the vocal cords, see discussion above  While findings could reflect physiologic tracer activity, given mild asymmetry, consider correlation with direct visualization and contrast-enhanced MRI of the neck as clinically indicated  Please see above for details and additional findings  The study was marked in EPIC for significant notification   Workstation performed: [id number]

## 2022-07-13 NOTE — PROGRESS NOTES
I met with Hortencia Watt at her visit with Dr Davison Brood today  I introduced myself and explained my role to her  Questions answered, support provided  Patient was given my business card for any future calls or concerns

## 2022-07-13 NOTE — ASSESSMENT & PLAN NOTE
We had a discussion with Mr Luigi Aguero after personally reviewing her medical history and imaging  She has a left lower lobe lung nodule that was PET avid and biopsy proven adenocarcinoma of the lung  There was no evidence of metastatic disease, and therefore it is a clinical stage I lung cancer  Her PFT's are severely decreased, but she is asymptomatic  Her options include SBRT or a flexible bronchoscopy, left thoracoscopic robotic assisted lower lobe wedge resection with MLND  She is not a candidate for a lobectomy, secondary to her PFT's  The patient would like to proceed with surgery and the procedure, possible risks, and post operative course were explained and all questions were answered  She will undergo blood work and an EKG prior to surgery  She can stay on her asa 81 mg

## 2022-07-14 ENCOUNTER — APPOINTMENT (OUTPATIENT)
Dept: LAB | Facility: HOSPITAL | Age: 77
End: 2022-07-14
Payer: COMMERCIAL

## 2022-07-14 ENCOUNTER — LAB REQUISITION (OUTPATIENT)
Dept: LAB | Facility: HOSPITAL | Age: 77
End: 2022-07-14
Payer: COMMERCIAL

## 2022-07-14 ENCOUNTER — ANESTHESIA EVENT (OUTPATIENT)
Dept: PERIOP | Facility: HOSPITAL | Age: 77
DRG: 165 | End: 2022-07-14
Payer: COMMERCIAL

## 2022-07-14 DIAGNOSIS — Z01.818 ENCOUNTER FOR OTHER PREPROCEDURAL EXAMINATION: ICD-10-CM

## 2022-07-14 DIAGNOSIS — Z01.818 OTHER SPECIFIED PRE-OPERATIVE EXAMINATION: ICD-10-CM

## 2022-07-14 DIAGNOSIS — C34.90 ADENOCARCINOMA OF LUNG, UNSPECIFIED LATERALITY (HCC): ICD-10-CM

## 2022-07-14 LAB
ANION GAP SERPL CALCULATED.3IONS-SCNC: 7 MMOL/L (ref 4–13)
APTT PPP: 28 SECONDS (ref 23–37)
BUN SERPL-MCNC: 19 MG/DL (ref 5–25)
CALCIUM SERPL-MCNC: 9.8 MG/DL (ref 8.4–10.2)
CHLORIDE SERPL-SCNC: 107 MMOL/L (ref 96–108)
CO2 SERPL-SCNC: 27 MMOL/L (ref 21–32)
CREAT SERPL-MCNC: 0.81 MG/DL (ref 0.6–1.3)
ERYTHROCYTE [DISTWIDTH] IN BLOOD BY AUTOMATED COUNT: 13.9 % (ref 11.6–15.1)
GFR SERPL CREATININE-BSD FRML MDRD: 71 ML/MIN/1.73SQ M
GLUCOSE P FAST SERPL-MCNC: 97 MG/DL (ref 65–99)
HCT VFR BLD AUTO: 45.7 % (ref 34.8–46.1)
HGB BLD-MCNC: 14.7 G/DL (ref 11.5–15.4)
INR PPP: 0.99 (ref 0.84–1.19)
MCH RBC QN AUTO: 29.2 PG (ref 26.8–34.3)
MCHC RBC AUTO-ENTMCNC: 32.2 G/DL (ref 31.4–37.4)
MCV RBC AUTO: 91 FL (ref 82–98)
PLATELET # BLD AUTO: 169 THOUSANDS/UL (ref 149–390)
PMV BLD AUTO: 9.6 FL (ref 8.9–12.7)
POTASSIUM SERPL-SCNC: 4.2 MMOL/L (ref 3.5–5.3)
PROTHROMBIN TIME: 13 SECONDS (ref 11.6–14.5)
RBC # BLD AUTO: 5.03 MILLION/UL (ref 3.81–5.12)
SODIUM SERPL-SCNC: 141 MMOL/L (ref 135–147)
WBC # BLD AUTO: 3.66 THOUSAND/UL (ref 4.31–10.16)

## 2022-07-14 PROCEDURE — 85027 COMPLETE CBC AUTOMATED: CPT

## 2022-07-14 PROCEDURE — 36415 COLL VENOUS BLD VENIPUNCTURE: CPT

## 2022-07-14 PROCEDURE — 80048 BASIC METABOLIC PNL TOTAL CA: CPT

## 2022-07-14 PROCEDURE — 93005 ELECTROCARDIOGRAM TRACING: CPT

## 2022-07-14 PROCEDURE — 85730 THROMBOPLASTIN TIME PARTIAL: CPT

## 2022-07-14 PROCEDURE — 85610 PROTHROMBIN TIME: CPT

## 2022-07-14 PROCEDURE — 86900 BLOOD TYPING SEROLOGIC ABO: CPT | Performed by: THORACIC SURGERY (CARDIOTHORACIC VASCULAR SURGERY)

## 2022-07-14 PROCEDURE — 86850 RBC ANTIBODY SCREEN: CPT | Performed by: THORACIC SURGERY (CARDIOTHORACIC VASCULAR SURGERY)

## 2022-07-14 PROCEDURE — 86901 BLOOD TYPING SEROLOGIC RH(D): CPT | Performed by: THORACIC SURGERY (CARDIOTHORACIC VASCULAR SURGERY)

## 2022-07-15 ENCOUNTER — PATIENT OUTREACH (OUTPATIENT)
Dept: HEMATOLOGY ONCOLOGY | Facility: CLINIC | Age: 77
End: 2022-07-15

## 2022-07-15 LAB
ABO GROUP BLD: NORMAL
BLD GP AB SCN SERPL QL: NEGATIVE
RH BLD: POSITIVE
SPECIMEN EXPIRATION DATE: NORMAL

## 2022-07-15 NOTE — PROGRESS NOTES
MSW received a call this day from the pt and she was sounding anxious about her upcoming surgery  Pt was asking about care at home for herself post surgery  She states that her significant other would not be able to help her and she feels she will need help after hospital stay  Pt was requesting short term rehab  She states that she spoke with her PCP this day and this was the recommendation  MSW explained that she may not qualify for this and that more information would be obtained and a return call would be made  MSW placed call to Chad Wright RN, and learned that the pt will not be leaving the hospital with any drains and will not be in need of any VNA services  Unless the pt encountered anything out of ht ordinary, she is anticipated to do fine post discharge  MSW called the pt back and explained all of this to her  As the conversation progressed, her anxiety appeared to decrease  MSW did assure the pt that there are  in the hospital and if her needs should change, she would have someone to help her  Significant support and assurance was provided

## 2022-07-18 NOTE — PRE-PROCEDURE INSTRUCTIONS
Pre-Surgery Instructions:   Medication Instructions    aspirin 81 mg chewable tablet Instructions provided by MD    cholecalciferol (VITAMIN D3) 400 units tablet Hold until after surgery    cyanocobalamin (VITAMIN B-12) 100 mcg tablet Hold until after surgery    Diclofenac Sodium (VOLTAREN) 1 % Stop taking 3 days prior to surgery   levothyroxine 75 mcg tablet Take day of surgery   loratadine (CLARITIN) 10 mg tablet Take day of surgery   magnesium gluconate (MAGONATE) 500 MG tablet Hold until after surgery    Omega-3 Fatty Acids (fish oil) 1,000 mg Hold until after surgery    vitamin E 100 UNIT capsule Hold until after surgery      See Geriatric Assessment below        Cognitive Assessment: no concerns   CAM: n/a   TUG <15 sec:n/a   Falls (last 6 months): none   Hand  score:n/a   Bruce Total Score: 23   PHQ- 9 Depression Scale: 0   Nutrition Assessment Score: 12   METS: 7 77   Health goals:  -What are your overall health goals? (quit smoking, wt  loss, rest, decrease stress)  Breathing better  -What brings you strength? (family, friends, Mandaen, health)  family  -What activities are important to you? (exercise, reading, travel, work)      Reviewed with patient, in detail, instructions from "My Surgical Experience"  Instructed to avoid all  OTC vitamins/supplements and NSAIDS from now until after surgery per anesthesia guidelines  Tylenol ok to take PRN  Advised patient that Cristian Aguilar will call with surgery arrival time and hospital directions the business day prior to surgery  Advised patient nothing eat or drink after midnight prior to surgery  Instructed to call surgeon's office in meantime with any new illnesses/exposure  Patient verbalized understanding of current visitor restrictions/masking guidelines and advised that he/she can confirm these at time of arrival call with Cristian Aguilar  Reviewed Carb Drink Instructions per Surgeon/Anes  Guidelines     Patient verbalized understanding and knows to call surgeon's office with any additional questions prior to surgery

## 2022-07-20 LAB
ATRIAL RATE: 68 BPM
P AXIS: 105 DEGREES
PR INTERVAL: 164 MS
QRS AXIS: 183 DEGREES
QRSD INTERVAL: 70 MS
QT INTERVAL: 372 MS
QTC INTERVAL: 395 MS
T WAVE AXIS: 156 DEGREES
VENTRICULAR RATE: 68 BPM

## 2022-07-20 PROCEDURE — 93010 ELECTROCARDIOGRAM REPORT: CPT | Performed by: INTERNAL MEDICINE

## 2022-07-21 ENCOUNTER — APPOINTMENT (INPATIENT)
Dept: RADIOLOGY | Facility: HOSPITAL | Age: 77
DRG: 165 | End: 2022-07-21
Payer: COMMERCIAL

## 2022-07-21 ENCOUNTER — HOSPITAL ENCOUNTER (INPATIENT)
Facility: HOSPITAL | Age: 77
LOS: 3 days | Discharge: HOME HEALTH | DRG: 165 | End: 2022-07-24
Attending: THORACIC SURGERY (CARDIOTHORACIC VASCULAR SURGERY) | Admitting: THORACIC SURGERY (CARDIOTHORACIC VASCULAR SURGERY)
Payer: COMMERCIAL

## 2022-07-21 ENCOUNTER — ANESTHESIA (OUTPATIENT)
Dept: PERIOP | Facility: HOSPITAL | Age: 77
DRG: 165 | End: 2022-07-21
Payer: COMMERCIAL

## 2022-07-21 DIAGNOSIS — C34.90 ADENOCARCINOMA OF LUNG, UNSPECIFIED LATERALITY (HCC): ICD-10-CM

## 2022-07-21 DIAGNOSIS — C34.92 ADENOCARCINOMA OF LEFT LUNG (HCC): Primary | ICD-10-CM

## 2022-07-21 PROCEDURE — S2900 ROBOTIC SURGICAL SYSTEM: HCPCS | Performed by: THORACIC SURGERY (CARDIOTHORACIC VASCULAR SURGERY)

## 2022-07-21 PROCEDURE — 07T74ZZ RESECTION OF THORAX LYMPHATIC, PERCUTANEOUS ENDOSCOPIC APPROACH: ICD-10-PCS | Performed by: THORACIC SURGERY (CARDIOTHORACIC VASCULAR SURGERY)

## 2022-07-21 PROCEDURE — 0BJ08ZZ INSPECTION OF TRACHEOBRONCHIAL TREE, VIA NATURAL OR ARTIFICIAL OPENING ENDOSCOPIC: ICD-10-PCS | Performed by: THORACIC SURGERY (CARDIOTHORACIC VASCULAR SURGERY)

## 2022-07-21 PROCEDURE — C9290 INJ, BUPIVACAINE LIPOSOME: HCPCS | Performed by: THORACIC SURGERY (CARDIOTHORACIC VASCULAR SURGERY)

## 2022-07-21 PROCEDURE — 88341 IMHCHEM/IMCYTCHM EA ADD ANTB: CPT | Performed by: PATHOLOGY

## 2022-07-21 PROCEDURE — 88342 IMHCHEM/IMCYTCHM 1ST ANTB: CPT | Performed by: PATHOLOGY

## 2022-07-21 PROCEDURE — NC001 PR NO CHARGE: Performed by: PHYSICIAN ASSISTANT

## 2022-07-21 PROCEDURE — 88307 TISSUE EXAM BY PATHOLOGIST: CPT | Performed by: PATHOLOGY

## 2022-07-21 PROCEDURE — 32674 THORACOSCOPY LYMPH NODE EXC: CPT | Performed by: THORACIC SURGERY (CARDIOTHORACIC VASCULAR SURGERY)

## 2022-07-21 PROCEDURE — 0BTJ4ZZ RESECTION OF LEFT LOWER LUNG LOBE, PERCUTANEOUS ENDOSCOPIC APPROACH: ICD-10-PCS | Performed by: THORACIC SURGERY (CARDIOTHORACIC VASCULAR SURGERY)

## 2022-07-21 PROCEDURE — 88305 TISSUE EXAM BY PATHOLOGIST: CPT | Performed by: PATHOLOGY

## 2022-07-21 PROCEDURE — 99024 POSTOP FOLLOW-UP VISIT: CPT | Performed by: THORACIC SURGERY (CARDIOTHORACIC VASCULAR SURGERY)

## 2022-07-21 PROCEDURE — 71045 X-RAY EXAM CHEST 1 VIEW: CPT

## 2022-07-21 PROCEDURE — 32666 THORACOSCOPY W/WEDGE RESECT: CPT | Performed by: THORACIC SURGERY (CARDIOTHORACIC VASCULAR SURGERY)

## 2022-07-21 PROCEDURE — 31622 DX BRONCHOSCOPE/WASH: CPT | Performed by: THORACIC SURGERY (CARDIOTHORACIC VASCULAR SURGERY)

## 2022-07-21 RX ORDER — SODIUM CHLORIDE 9 MG/ML
INJECTION INTRAVENOUS AS NEEDED
Status: DISCONTINUED | OUTPATIENT
Start: 2022-07-21 | End: 2022-07-21 | Stop reason: HOSPADM

## 2022-07-21 RX ORDER — BUPIVACAINE HYDROCHLORIDE 2.5 MG/ML
INJECTION, SOLUTION EPIDURAL; INFILTRATION; INTRACAUDAL AS NEEDED
Status: DISCONTINUED | OUTPATIENT
Start: 2022-07-21 | End: 2022-07-21 | Stop reason: HOSPADM

## 2022-07-21 RX ORDER — HYDROMORPHONE HCL/PF 1 MG/ML
0.4 SYRINGE (ML) INJECTION
Status: DISCONTINUED | OUTPATIENT
Start: 2022-07-21 | End: 2022-07-21 | Stop reason: HOSPADM

## 2022-07-21 RX ORDER — ALBUTEROL SULFATE 2.5 MG/3ML
2.5 SOLUTION RESPIRATORY (INHALATION) ONCE AS NEEDED
Status: DISCONTINUED | OUTPATIENT
Start: 2022-07-21 | End: 2022-07-21 | Stop reason: HOSPADM

## 2022-07-21 RX ORDER — FENTANYL CITRATE/PF 50 MCG/ML
25 SYRINGE (ML) INJECTION
Status: COMPLETED | OUTPATIENT
Start: 2022-07-21 | End: 2022-07-21

## 2022-07-21 RX ORDER — SODIUM CHLORIDE, SODIUM LACTATE, POTASSIUM CHLORIDE, CALCIUM CHLORIDE 600; 310; 30; 20 MG/100ML; MG/100ML; MG/100ML; MG/100ML
60 INJECTION, SOLUTION INTRAVENOUS CONTINUOUS
Status: DISCONTINUED | OUTPATIENT
Start: 2022-07-21 | End: 2022-07-22 | Stop reason: ALTCHOICE

## 2022-07-21 RX ORDER — OXYCODONE HYDROCHLORIDE 5 MG/1
5 TABLET ORAL EVERY 4 HOURS PRN
Status: DISCONTINUED | OUTPATIENT
Start: 2022-07-21 | End: 2022-07-24 | Stop reason: HOSPADM

## 2022-07-21 RX ORDER — HEPARIN SODIUM 5000 [USP'U]/ML
5000 INJECTION, SOLUTION INTRAVENOUS; SUBCUTANEOUS
Status: COMPLETED | OUTPATIENT
Start: 2022-07-21 | End: 2022-07-21

## 2022-07-21 RX ORDER — ONDANSETRON 2 MG/ML
INJECTION INTRAMUSCULAR; INTRAVENOUS AS NEEDED
Status: DISCONTINUED | OUTPATIENT
Start: 2022-07-21 | End: 2022-07-21

## 2022-07-21 RX ORDER — OXYCODONE HYDROCHLORIDE 5 MG/1
2.5 TABLET ORAL EVERY 4 HOURS PRN
Status: DISCONTINUED | OUTPATIENT
Start: 2022-07-21 | End: 2022-07-24 | Stop reason: HOSPADM

## 2022-07-21 RX ORDER — PROMETHAZINE HYDROCHLORIDE 25 MG/ML
6.25 INJECTION, SOLUTION INTRAMUSCULAR; INTRAVENOUS ONCE
Status: COMPLETED | OUTPATIENT
Start: 2022-07-21 | End: 2022-07-21

## 2022-07-21 RX ORDER — ACETAMINOPHEN 325 MG/1
975 TABLET ORAL ONCE
Status: COMPLETED | OUTPATIENT
Start: 2022-07-21 | End: 2022-07-21

## 2022-07-21 RX ORDER — HYDROMORPHONE HCL/PF 1 MG/ML
0.5 SYRINGE (ML) INJECTION
Status: DISCONTINUED | OUTPATIENT
Start: 2022-07-21 | End: 2022-07-21

## 2022-07-21 RX ORDER — CEFAZOLIN SODIUM 1 G/50ML
1000 SOLUTION INTRAVENOUS ONCE
Status: DISCONTINUED | OUTPATIENT
Start: 2022-07-21 | End: 2022-07-21

## 2022-07-21 RX ORDER — ACETAMINOPHEN 325 MG/1
975 TABLET ORAL EVERY 6 HOURS
Status: DISCONTINUED | OUTPATIENT
Start: 2022-07-21 | End: 2022-07-24 | Stop reason: HOSPADM

## 2022-07-21 RX ORDER — GABAPENTIN 300 MG/1
600 CAPSULE ORAL ONCE
Status: COMPLETED | OUTPATIENT
Start: 2022-07-21 | End: 2022-07-21

## 2022-07-21 RX ORDER — ONDANSETRON 2 MG/ML
4 INJECTION INTRAMUSCULAR; INTRAVENOUS ONCE AS NEEDED
Status: COMPLETED | OUTPATIENT
Start: 2022-07-21 | End: 2022-07-21

## 2022-07-21 RX ORDER — LEVOTHYROXINE SODIUM 0.07 MG/1
75 TABLET ORAL
Status: DISCONTINUED | OUTPATIENT
Start: 2022-07-22 | End: 2022-07-24 | Stop reason: HOSPADM

## 2022-07-21 RX ORDER — PROPOFOL 10 MG/ML
INJECTION, EMULSION INTRAVENOUS AS NEEDED
Status: DISCONTINUED | OUTPATIENT
Start: 2022-07-21 | End: 2022-07-21

## 2022-07-21 RX ORDER — SODIUM CHLORIDE 9 MG/ML
INJECTION, SOLUTION INTRAVENOUS CONTINUOUS PRN
Status: DISCONTINUED | OUTPATIENT
Start: 2022-07-21 | End: 2022-07-21

## 2022-07-21 RX ORDER — PROMETHAZINE HYDROCHLORIDE 25 MG/ML
6.25 INJECTION, SOLUTION INTRAMUSCULAR; INTRAVENOUS ONCE
Status: DISCONTINUED | OUTPATIENT
Start: 2022-07-21 | End: 2022-07-21

## 2022-07-21 RX ORDER — SODIUM CHLORIDE, SODIUM LACTATE, POTASSIUM CHLORIDE, CALCIUM CHLORIDE 600; 310; 30; 20 MG/100ML; MG/100ML; MG/100ML; MG/100ML
INJECTION, SOLUTION INTRAVENOUS CONTINUOUS PRN
Status: DISCONTINUED | OUTPATIENT
Start: 2022-07-21 | End: 2022-07-21

## 2022-07-21 RX ORDER — FENTANYL CITRATE 50 UG/ML
INJECTION, SOLUTION INTRAMUSCULAR; INTRAVENOUS AS NEEDED
Status: DISCONTINUED | OUTPATIENT
Start: 2022-07-21 | End: 2022-07-21

## 2022-07-21 RX ORDER — HEPARIN SODIUM 5000 [USP'U]/ML
5000 INJECTION, SOLUTION INTRAVENOUS; SUBCUTANEOUS
Status: DISCONTINUED | OUTPATIENT
Start: 2022-07-22 | End: 2022-07-21

## 2022-07-21 RX ORDER — HYDROMORPHONE HCL/PF 1 MG/ML
0.5 SYRINGE (ML) INJECTION EVERY 4 HOURS PRN
Status: DISCONTINUED | OUTPATIENT
Start: 2022-07-21 | End: 2022-07-23

## 2022-07-21 RX ORDER — ENOXAPARIN SODIUM 100 MG/ML
40 INJECTION SUBCUTANEOUS DAILY
Status: DISCONTINUED | OUTPATIENT
Start: 2022-07-22 | End: 2022-07-24 | Stop reason: HOSPADM

## 2022-07-21 RX ORDER — ROCURONIUM BROMIDE 10 MG/ML
INJECTION, SOLUTION INTRAVENOUS AS NEEDED
Status: DISCONTINUED | OUTPATIENT
Start: 2022-07-21 | End: 2022-07-21

## 2022-07-21 RX ORDER — HYDROMORPHONE HCL/PF 1 MG/ML
SYRINGE (ML) INJECTION AS NEEDED
Status: DISCONTINUED | OUTPATIENT
Start: 2022-07-21 | End: 2022-07-21

## 2022-07-21 RX ORDER — LIDOCAINE HYDROCHLORIDE 10 MG/ML
INJECTION, SOLUTION EPIDURAL; INFILTRATION; INTRACAUDAL; PERINEURAL AS NEEDED
Status: DISCONTINUED | OUTPATIENT
Start: 2022-07-21 | End: 2022-07-21

## 2022-07-21 RX ORDER — MAGNESIUM HYDROXIDE 1200 MG/15ML
LIQUID ORAL AS NEEDED
Status: DISCONTINUED | OUTPATIENT
Start: 2022-07-21 | End: 2022-07-21 | Stop reason: HOSPADM

## 2022-07-21 RX ORDER — ONDANSETRON 2 MG/ML
4 INJECTION INTRAMUSCULAR; INTRAVENOUS EVERY 6 HOURS PRN
Status: DISCONTINUED | OUTPATIENT
Start: 2022-07-21 | End: 2022-07-24 | Stop reason: HOSPADM

## 2022-07-21 RX ORDER — DEXAMETHASONE SODIUM PHOSPHATE 10 MG/ML
INJECTION, SOLUTION INTRAMUSCULAR; INTRAVENOUS AS NEEDED
Status: DISCONTINUED | OUTPATIENT
Start: 2022-07-21 | End: 2022-07-21

## 2022-07-21 RX ORDER — LORATADINE 10 MG/1
10 TABLET ORAL DAILY
Status: DISCONTINUED | OUTPATIENT
Start: 2022-07-22 | End: 2022-07-24 | Stop reason: HOSPADM

## 2022-07-21 RX ORDER — CEFAZOLIN SODIUM 1 G/3ML
INJECTION, POWDER, FOR SOLUTION INTRAMUSCULAR; INTRAVENOUS AS NEEDED
Status: DISCONTINUED | OUTPATIENT
Start: 2022-07-21 | End: 2022-07-21

## 2022-07-21 RX ADMIN — SODIUM CHLORIDE, SODIUM LACTATE, POTASSIUM CHLORIDE, AND CALCIUM CHLORIDE: .6; .31; .03; .02 INJECTION, SOLUTION INTRAVENOUS at 16:33

## 2022-07-21 RX ADMIN — Medication 25 MCG: at 19:42

## 2022-07-21 RX ADMIN — SODIUM CHLORIDE: 0.9 INJECTION, SOLUTION INTRAVENOUS at 18:04

## 2022-07-21 RX ADMIN — SODIUM CHLORIDE: 0.9 INJECTION, SOLUTION INTRAVENOUS at 16:57

## 2022-07-21 RX ADMIN — SODIUM CHLORIDE, SODIUM LACTATE, POTASSIUM CHLORIDE, AND CALCIUM CHLORIDE 60 ML/HR: .6; .31; .03; .02 INJECTION, SOLUTION INTRAVENOUS at 20:19

## 2022-07-21 RX ADMIN — ONDANSETRON 4 MG: 2 INJECTION INTRAMUSCULAR; INTRAVENOUS at 19:48

## 2022-07-21 RX ADMIN — FENTANYL CITRATE 50 MCG: 50 INJECTION INTRAMUSCULAR; INTRAVENOUS at 16:47

## 2022-07-21 RX ADMIN — PHENYLEPHRINE HYDROCHLORIDE 40 MCG/MIN: 10 INJECTION INTRAVENOUS at 18:04

## 2022-07-21 RX ADMIN — CEFAZOLIN 1000 MG: 1 INJECTION, POWDER, FOR SOLUTION INTRAMUSCULAR; INTRAVENOUS at 16:57

## 2022-07-21 RX ADMIN — PROMETHAZINE HYDROCHLORIDE 6.25 MG: 25 INJECTION INTRAMUSCULAR; INTRAVENOUS at 20:24

## 2022-07-21 RX ADMIN — Medication 25 MCG: at 19:50

## 2022-07-21 RX ADMIN — GABAPENTIN 600 MG: 300 CAPSULE ORAL at 14:04

## 2022-07-21 RX ADMIN — DEXAMETHASONE SODIUM PHOSPHATE 10 MG: 10 INJECTION, SOLUTION INTRAMUSCULAR; INTRAVENOUS at 17:01

## 2022-07-21 RX ADMIN — Medication 25 MCG: at 19:54

## 2022-07-21 RX ADMIN — ROCURONIUM BROMIDE 40 MG: 50 INJECTION INTRAVENOUS at 16:47

## 2022-07-21 RX ADMIN — FENTANYL CITRATE 50 MCG: 50 INJECTION INTRAMUSCULAR; INTRAVENOUS at 16:56

## 2022-07-21 RX ADMIN — Medication 25 MCG: at 19:32

## 2022-07-21 RX ADMIN — ONDANSETRON 4 MG: 2 INJECTION INTRAMUSCULAR; INTRAVENOUS at 18:48

## 2022-07-21 RX ADMIN — HEPARIN SODIUM 5000 UNITS: 5000 INJECTION INTRAVENOUS; SUBCUTANEOUS at 14:05

## 2022-07-21 RX ADMIN — PROPOFOL 150 MG: 10 INJECTION, EMULSION INTRAVENOUS at 16:47

## 2022-07-21 RX ADMIN — HYDROMORPHONE HYDROCHLORIDE 1 MG: 1 INJECTION, SOLUTION INTRAMUSCULAR; INTRAVENOUS; SUBCUTANEOUS at 19:17

## 2022-07-21 RX ADMIN — LIDOCAINE HYDROCHLORIDE 50 MG: 10 INJECTION, SOLUTION EPIDURAL; INFILTRATION; INTRACAUDAL; PERINEURAL at 16:47

## 2022-07-21 RX ADMIN — ACETAMINOPHEN 975 MG: 325 TABLET ORAL at 14:04

## 2022-07-21 RX ADMIN — SUGAMMADEX 200 MG: 100 INJECTION, SOLUTION INTRAVENOUS at 19:02

## 2022-07-21 NOTE — ANESTHESIA POSTPROCEDURE EVALUATION
Post-Op Assessment Note    CV Status:  Stable    Pain management: adequate     Mental Status:  Awake   Hydration Status:  Stable   PONV Controlled:  Controlled   Airway Patency:  Patent      Post Op Vitals Reviewed: Yes      Staff: Anesthesiologist, CRNA         No complications documented      BP   151/87   Temp   97 2   Pulse  77   Resp   18   SpO2   100

## 2022-07-21 NOTE — ANESTHESIA PROCEDURE NOTES
Arterial Line Insertion  Performed by: Roberta Desai CRNA  Authorized by: Daija Li MD   Consent: Verbal consent obtained  Written consent obtained  Risks and benefits: risks, benefits and alternatives were discussed  Consent given by: patient  Patient understanding: patient states understanding of the procedure being performed  Patient consent: the patient's understanding of the procedure matches consent given  Procedure consent: procedure consent matches procedure scheduled  Relevant documents: relevant documents present and verified  Required items: required blood products, implants, devices, and special equipment available  Patient identity confirmed: verbally with patient and arm band  Preparation: Patient was prepped and draped in the usual sterile fashion    Indications: hemodynamic monitoring  Orientation:  Right  Location: radial artery  Procedure Details:  Needle gauge: 20  Seldinger technique: Seldinger technique used  Number of attempts: 1    Post-procedure:  Post-procedure: dressing applied  Waveform: good waveform and waveform confirmed  Patient tolerance: Patient tolerated the procedure well with no immediate complications

## 2022-07-21 NOTE — ANESTHESIA PREPROCEDURE EVALUATION
Procedure:  BRONCHOSCOPY FLEXIBLE (N/A Bronchus)  RESECTION,LUNG WEDGE THORACOSCOPIC W/ ROBOTICS, w/ mediastinal lymph node dissection (Left Chest)    Relevant Problems   No relevant active problems        Physical Exam    Airway    Mallampati score: III  TM Distance: <3 FB  Neck ROM: limited     Dental   No notable dental hx     Cardiovascular  Cardiovascular exam normal    Pulmonary  Pulmonary exam normal Decreased breath sounds,     Other Findings        Anesthesia Plan  ASA Score- 3     Anesthesia Type- general with ASA Monitors  Additional Monitors: arterial line  Airway Plan: ETT  Comment: [place] for double lumen ett  Good cardiacf fxn (EF 60%) with no valvular issues; good exercise tolerance  Abnl PFTS  Medical clearance in chart  Plan Factors-Exercise tolerance (METS): >4 METS  Chart reviewed  EKG reviewed  Imaging results reviewed  Existing labs reviewed  Patient summary reviewed  Patient is not a current smoker  Induction- intravenous  Postoperative Plan- Plan for postoperative opioid use  Planned trial extubation    Informed Consent- Anesthetic plan and risks discussed with patient  I personally reviewed this patient with the CRNA  Discussed and agreed on the Anesthesia Plan with the CRNA  Hipolito Ozuna

## 2022-07-21 NOTE — OP NOTE
OPERATIVE REPORT  PATIENT NAME: Cj Dominguez    :  [id number]  MRN: [id number]  Pt Location: BE OR ROOM 15    SURGERY DATE: 2022    Surgeon(s) and Role:     * Rosa Moss MD - Primary     * Alex Addison - Assisting     * Guanakito Duron PA-C - Assisting     * Ashley Lopez MD - Assisting    Preop Diagnosis:  Adenocarcinoma of lung, unspecified laterality ([address] ) [C34 90]    Post-Op Diagnosis Codes:     * Adenocarcinoma of lung, unspecified laterality ([address] ) [C34 90]    Procedure(s) (LRB):  BRONCHOSCOPY FLEXIBLE (N/A)  RESECTION,LUNG WEDGE THORACOSCOPIC W/ ROBOTICS, w/ mediastinal lymph node dissection (Left)   1  Robotic left lower lobe therapeutic wedge resection  2  Mediastinal lymph node dissection  3  Bronchoscopy  4  Left T3 through T10 intercostal nerve block with Exparel    Specimen(s):  ID Type Source Tests Collected by Time Destination   1 : LEVEL 9 Tissue Lymph Node TISSUE EXAM Rosa Moss MD 2022 1751    2 : LEVEL 9 NUMBER TWO Tissue Lymph Node TISSUE EXAM Rosa Moss MD 2022 1753    3 : LEVEL 8 Tissue Lymph Node TISSUE EXAM Rosa Moss MD 2022 1756    4 : LEVEL 8 NUMBER TWO Tissue Lymph Node TISSUE EXAM Rosa Moss MD 2022 1757    5 : LEVEL 5 Tissue Lymph Node TISSUE EXAM Rosa Moss MD 2022 1809    6 : LEVEL 6 Tissue Lymph Node TISSUE EXAM Rosa Moss MD 2022 1819    7 : LEFT LOWER LOBE WEDGE Tissue Lung TISSUE EXAM Rosa Moss MD 2022 1831        Estimated Blood Loss:   Minimal    Drains:  * No LDAs found *    Anesthesia Type:   General    Operative Indications:  Adenocarcinoma of lung, unspecified laterality ([address] ) []  43-year-old female with a biopsy-proven 1 9 cm left lower lobe adenocarcinoma in the setting severe pulmonary dysfunction and poor PFTs  Operative Findings:  No definite evidence of lymph node involvement    Able to remove left lower lobe cancer with generous wedge resection  Complications:   None    Procedure and Technique:  After obtaining informed consent from the patient, they were transferred to the operating room and placed supine on the operating table  Bilateral SCDs were placed and turned on prior to induction of general anesthesia  General anesthesia was then induced and a double-lumen endotracheal tube was placed without incident  Positioning of the double-lumen tube was verified with a pediatric bronchoscope  The patient was then positioned in a right lateral decubitus position with the table fully flexed  The right arm was secured to an armboard and the left arm was positioned safely in a arm brandt  Rolled blankets were used for support anterior and posterior and pillows were placed between her legs  All bony prominences were padded and checked  Positioning of double-lumen endotracheal tube was verified at this time  Next a formal time-out was called verifying patient , date of birth, procedure, consent, antibiotics, beta-blocker as indicated, anticipated specimens with handling, SCD use and other special considerations  A bronchoscopy was performed through the double-lumen endotracheal tube  There were no endobronchial masses or obstructions identified  All secretions were suctioned out and positioning of the double lumen tube was again verified  There was no suspicion or identified risk for TB or other air board infectious disease  This bronchoscopy was being performed for diagnostic purposes only  The left chest was then prepped and draped in the standard sterile fashion  Anesthesia clamped and placed suction to the left lobe at this time to help desufflate  Bony landmarks were identified and planned incisions were mapped out  An 8 mm incision was made in the posterior axillary line 8th interspace for the camera port  The camera was inserted verifying that we are in the thoracic cavity and insufflation was initiated to 8 mm of mercury  A thorough thoracoscopy was carried out at this time  There were no significant adhesions  Next an intercostal nerve block was performed using 60 mL of a mixture 1/2 percent Marcaine, normal saline and liposomal bupivacaine (Exparel)  Rib spaces 3 through 10 were identified and using a percutaneous approach a block was placed into each of these ribs spaces with approximately 5mL of this mixture  The remainder of the robotic ports were placed at this time  An 8 mm incision was made as far posterior as possible in the 8th interspace in the posterior port was placed under direct visualization  An 8 mm incision was made have was made USP in between the camera and arm 4 port and this port was placed under direct visualization  Anteriorly a 12 mm incision was made as anterior and inferior as possible along the costal margin with the insertion point just above the diaphragm  The robotic stapling port was placed through this under direct visualization  Finally a 15 mm incision was made and a 15 mm assistant port was made USP between the camera and arm 1 and as inferior as possible staying above the diaphragm  The robot was docked at this time  A caudier forceps was inserted into arm 1, [place] bipolar grasper into arm 3 and tip up fenestrated grasper into arm 4  I un-scrubbed at this time and went to the console  There were multiple adhesions from the left lower lobe to the diaphragm  These were taken down with cautery  We then took down the inferior pulmonary ligament with bipolar cautery  This dissection was continued towards the hilum to the level of the inferior pulmonary vein  We looked for any possible level 8 or level 9 lymph nodes  There were numerous level 8 and 9 lymph nodes that were identified removed and sent for permanent pathology  Next we reflected the lung anteriorly and opened up the posterior pleura  We dissected out below the bronchus and look for any level 7 lymph nodes    There is nothing visible  We continued this posterior dissection opening up the posterior pleura  We then went to the anterior hilum  We dissected out a large level 5 lymph node and a smaller level 6 lymph node  These were removed and sent for permanent pathology  We examined the fissure and posterior PA  There were no significant lymph nodes seen here  We then turned our attention to the left lower lobe mass  This was in an area posteriorly just below the superior segment  We felt this was not amenable to a segmental resection  Therefore we performed a generous wedge resection  This was removed with multiple 45 mm robotic staple firings  We placed this in a thoracoscopic anchor bag and removed this through our system port  Gross examination on the back table showed approximately 1 cm margin  We were satisfied with this  We then explored the chest and look for any potential bleeding  There was no appreciable bleeding seen  All robotic ports were removed under direct visualization and inspected for bleeding  Once satisfied with hemostasis, a 29 Faro[place] straight chest tube was placed through the camera port and the left upper lobe was then insufflated by anesthesia  This came up without incident and the camera was removed at this time  The 28 Faroese chest tube was secured to the chest wall with an 0 Prolene  An 0 Prolene U-stitch was also placed for help closing this incision following chest tube removal  The assistant port and specimen removal site was closed with running 0 Vicryl in the deep layers, 3-0 Vicryl in the soft tissue and 4 0 Monocryl for skin  The remainder of the ports were closed with interrupted 3-0 Vicryl and 4 Monocryl  Surgical glue was applied to all incisions  The chest tube was placed to a [place] Pleural drainage device to water seal       The patient extubated in the operating room and [facility]    There were transferred to the PACU in stable condition       I was present for the entire procedure    Patient Disposition:  PACU       SIGNATURE: Mere Temple MD  DATE: July 21, 2022  TIME: 6:48 PM

## 2022-07-22 ENCOUNTER — TELEPHONE (OUTPATIENT)
Dept: PULMONOLOGY | Facility: CLINIC | Age: 77
End: 2022-07-22

## 2022-07-22 ENCOUNTER — APPOINTMENT (INPATIENT)
Dept: RADIOLOGY | Facility: HOSPITAL | Age: 77
DRG: 165 | End: 2022-07-22
Payer: COMMERCIAL

## 2022-07-22 LAB
ANION GAP SERPL CALCULATED.3IONS-SCNC: 9 MMOL/L (ref 4–13)
BUN SERPL-MCNC: 11 MG/DL (ref 5–25)
CALCIUM SERPL-MCNC: 8.9 MG/DL (ref 8.3–10.1)
CHLORIDE SERPL-SCNC: 110 MMOL/L (ref 96–108)
CO2 SERPL-SCNC: 22 MMOL/L (ref 21–32)
CREAT SERPL-MCNC: 1.06 MG/DL (ref 0.6–1.3)
ERYTHROCYTE [DISTWIDTH] IN BLOOD BY AUTOMATED COUNT: 13.7 % (ref 11.6–15.1)
GFR SERPL CREATININE-BSD FRML MDRD: 51 ML/MIN/1.73SQ M
GLUCOSE SERPL-MCNC: 175 MG/DL (ref 65–140)
HCT VFR BLD AUTO: 45.8 % (ref 34.8–46.1)
HGB BLD-MCNC: 14 G/DL (ref 11.5–15.4)
MAGNESIUM SERPL-MCNC: 2.1 MG/DL (ref 1.6–2.6)
MCH RBC QN AUTO: 29 PG (ref 26.8–34.3)
MCHC RBC AUTO-ENTMCNC: 30.6 G/DL (ref 31.4–37.4)
MCV RBC AUTO: 95 FL (ref 82–98)
PLATELET # BLD AUTO: 168 THOUSANDS/UL (ref 149–390)
PMV BLD AUTO: 9.6 FL (ref 8.9–12.7)
POTASSIUM SERPL-SCNC: 4.1 MMOL/L (ref 3.5–5.3)
RBC # BLD AUTO: 4.83 MILLION/UL (ref 3.81–5.12)
SODIUM SERPL-SCNC: 141 MMOL/L (ref 135–147)
WBC # BLD AUTO: 11.45 THOUSAND/UL (ref 4.31–10.16)

## 2022-07-22 PROCEDURE — 71046 X-RAY EXAM CHEST 2 VIEWS: CPT

## 2022-07-22 PROCEDURE — 97163 PT EVAL HIGH COMPLEX 45 MIN: CPT

## 2022-07-22 PROCEDURE — 97166 OT EVAL MOD COMPLEX 45 MIN: CPT

## 2022-07-22 PROCEDURE — 99024 POSTOP FOLLOW-UP VISIT: CPT | Performed by: THORACIC SURGERY (CARDIOTHORACIC VASCULAR SURGERY)

## 2022-07-22 PROCEDURE — 80048 BASIC METABOLIC PNL TOTAL CA: CPT | Performed by: SURGERY

## 2022-07-22 PROCEDURE — 83735 ASSAY OF MAGNESIUM: CPT | Performed by: SURGERY

## 2022-07-22 PROCEDURE — 85027 COMPLETE CBC AUTOMATED: CPT | Performed by: SURGERY

## 2022-07-22 RX ORDER — ACETAMINOPHEN 325 MG/1
650 TABLET ORAL EVERY 6 HOURS
Qty: 56 TABLET | Refills: 0 | Status: SHIPPED | OUTPATIENT
Start: 2022-07-22 | End: 2022-07-25

## 2022-07-22 RX ORDER — OXYCODONE HYDROCHLORIDE 5 MG/1
5 TABLET ORAL EVERY 4 HOURS PRN
Qty: 30 TABLET | Refills: 0 | Status: SHIPPED | OUTPATIENT
Start: 2022-07-22 | End: 2022-07-25 | Stop reason: SDUPTHER

## 2022-07-22 RX ORDER — GABAPENTIN 300 MG/1
300 CAPSULE ORAL 3 TIMES DAILY
Qty: 63 CAPSULE | Refills: 0 | Status: SHIPPED | OUTPATIENT
Start: 2022-07-22 | End: 2022-07-25 | Stop reason: SDUPTHER

## 2022-07-22 RX ADMIN — ACETAMINOPHEN 975 MG: 325 TABLET ORAL at 01:37

## 2022-07-22 RX ADMIN — LORATADINE 10 MG: 10 TABLET ORAL at 08:35

## 2022-07-22 RX ADMIN — ACETAMINOPHEN 975 MG: 325 TABLET ORAL at 20:25

## 2022-07-22 RX ADMIN — ACETAMINOPHEN 975 MG: 325 TABLET ORAL at 06:55

## 2022-07-22 RX ADMIN — OXYCODONE HYDROCHLORIDE 5 MG: 5 TABLET ORAL at 16:14

## 2022-07-22 RX ADMIN — LEVOTHYROXINE SODIUM 75 MCG: 75 TABLET ORAL at 06:56

## 2022-07-22 RX ADMIN — ENOXAPARIN SODIUM 40 MG: 40 INJECTION SUBCUTANEOUS at 08:35

## 2022-07-22 RX ADMIN — ACETAMINOPHEN 975 MG: 325 TABLET ORAL at 12:53

## 2022-07-22 NOTE — PLAN OF CARE
Problem: PHYSICAL THERAPY ADULT  Goal: Performs mobility at highest level of function for planned discharge setting  See evaluation for individualized goals  Description: Treatment/Interventions: LE strengthening/ROM, Therapeutic exercise, Endurance training, Equipment eval/education, Bed mobility, Gait training, Spoke to nursing, Spoke to case management, OT  Equipment Recommended: [address]       See flowsheet documentation for full assessment, interventions and recommendations  Note: Prognosis: Good  Problem List: Decreased strength, Decreased endurance, Impaired balance, Decreased mobility  Assessment: Pt is 77 y o  female admitted with Dx of Adenocarcinoma of lung and underwent Robotic left lower lobe therapeutic wedge resection, Mediastinal lymph node dissection, Bronchoscopy and Left T3 through T10 intercostal nerve block with Exparel on 7/21/2022  Pt 's comorbidities affecting POC include: Arthritis, Borderline diabetes, CPAP (continuous positive airway pressure) dependence, Depression, DVT (deep venous thrombosis) ([address] ), Pulmonary emboli (HCC) Sleep apnea and pt reported hx of fx of (L) shld (denies ortho restrictions) and personal factors of: living alone  Pt's clinical presentation is currently  unstable/unpredictable which is evident in ongoing telem monitoring and CT in place  Pt presents w/ min postop guarding, min overall weakness, decreased endurance and inconsistent amb balance and gait patterns requiring use of rw at this time  Will cont to follow pt in PT for progressive mobilization to max level of (I), endurance, and safety  Otherwise, anticipate pt will return home w/ available support upon D/C when medically cleared; home PT follow up and rw are recommended at this time; will follow  PT Discharge Recommendation: Home with home health rehabilitation (home PT)    See flowsheet documentation for full assessment

## 2022-07-22 NOTE — TELEPHONE ENCOUNTER
----- Message from Kingston Claude, MA sent at 7/22/2022  3:37 PM EDT -----  Regarding: Congestion/cough: Post Bronch  Pt called and stated she is in hosp post bronch but has been very congested and was coughing up white mucus and now some chunks of bloody phelghm  She stated that the nurse has not come back to her room to check her and wanted to speak w/ Dr Diane Garsia for advise

## 2022-07-22 NOTE — DISCHARGE SUMMARY
Discharge Summary    Janelle Espinoza 77 y o  female MRN: [id number]    Unit/Bed#: [facility] 410-01 Encounter: [id number]    Admission Date: 7/21/2022     Discharge Date:/7/22/2022    Attending: Vinny Pittman MD  Operative Surgeon: Vinny Pittman MD      Consultants: none    Admitting Diagnosis: Adenocarcinoma of lung, unspecified laterality ([facility] ) [C34 90]    Principle Diagnosis: L lower lobe adenocarcinoma    Secondary Diagnosis:  Past Medical History:   Diagnosis Date    Adenocarcinoma of lung ([facility] ) 07/13/2022    Arthritis     Borderline diabetes     diet controlled    Cancer ([facility] )     Colon polyp     CPAP (continuous positive airway pressure) dependence     refuses to use    Depression     Disease of thyroid gland     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    DVT (deep venous thrombosis) (HCC)     hx- left leg    GERD (gastroesophageal reflux disease)     PONV (postoperative nausea and vomiting)     Pulmonary emboli (HCC)     during pregnancy    Rash     arms on occasion, cause unknown    Sleep apnea      Past Surgical History:   Procedure Laterality Date    COLONOSCOPY      DILATION AND CURETTAGE, DIAGNOSTIC / THERAPEUTIC      IR BIOPSY LUNG  6/15/2022    THYROIDECTOMY, PARTIAL      2020        Procedures Performed: Procedure(s):  BRONCHOSCOPY FLEXIBLE  RESECTION,LUNG WEDGE THORACOSCOPIC W/ ROBOTICS, w/ mediastinal lymph node dissection    Imaging:Procedure: XR chest portable    Result Date: 7/21/2022  Narrative: CHEST INDICATION:   s/p LLL wedge resection  COMPARISON:  Compared with 6/15/2022 EXAM PERFORMED/VIEWS:  XR CHEST PORTABLE FINDINGS: Left chest tube tip near the apex  Cardiomediastinal silhouette appears unremarkable  Suture in the left lung base  The lungs are clear  No pneumothorax or pleural effusion  Osseous structures appear within normal limits for patient age  Impression: Left chest tube in place with no pneumothorax  No acute cardiopulmonary disease   Workstation performed: [id number]       Discharge Medications:  See after visit summary for reconciled discharge medications provided to patient and family  Brief HPI (per H/P by Chalo Herndon PA-C):   Ms Ivon Resendiz is a 76 yo female with a history of hypothyroidism secondary to partial thyroidectomy but no evidence of carcinoma, DORIS, DVT/ PE, and depression who was referred by Dr Omaira Bergeron for a left lower lobe lung nodule  A CT from 5/8/22 revealed a 1 4 x 1 8 cm left lower lobe lung nodule  PET from 5/23/22 revealed an associated SUV of 2 4 with the 1 9 x 1 5 cm nodule  It also revealed mild activity adjacent to the left posterior aspect of the trachea near level of the vocal cords and thyroid cartilage with a SUV of 4 1, may be physiologic but radiology is recommending a neck MRI or direct visualization  IR biopsy of this nodule was performed on 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  PFT's from 6/30/22 revealed a FVC of 0 96L, 46%, FEV1 of 0 96L, 46%, and a DLCO of 50% predicted       She presents today for further discussion  She had multiple left DVT's with one PE  In [place], she had a "mini seizure" and on her workup, there was some scarring seen on some imaging  She was going to the gym, but has stopped recently  She denies fever, chills, shortness of breath, hemoptysis, cough, weight loss  She is a lifetime non smoker  She takes one baby aspirin a day, but no other AC  She is COVID vaccinated  Hospital Course: Carroll Richards is a 77 y o  female who presented 7/21/2022 per HPI and was taken to the OR for above procedure  Intraoperative findings included the following from operative report:  No definite evidence of lymph node involvement  Able to remove left lower lobe cancer with generous wedge resection  The patient hospital course was uncomplicated  Pt was admitted to the medical surgical floor on diet and fluids with one chest tube to [facility]  POD1, CT was w/o AL and pt pain was controlled on multimodal therapy  CT was removed in the afternoon of POD 1; postpull  showed a minimal left apical pneumothorax which as stable on repeat CXR POD2  Pt was deemed appropriate for dc but had concerns about returning home and her pain control regimen  She ultimately was discharged uneventfully on POD#3  Patient was discharged on HD2/POD1  On the day of discharge, the patient was voiding spontaneously, ambulating at baseline, and pain was well controlled  The patient was sent home with prescriptions for pain per the Thoracic surgery ERAS protocol and on home medication  She understood all instructions for discharge  She was also given the names and numbers of the providers as well as instructions for follow up appointments  Condition at Discharge: good     Provisions for Follow-Up Care:  See after visit summary for information related to follow-up care and any pertinent home health orders  Disposition: Home    Discharge instructions/Information to patient and family:   See after visit summary for information provided to patient and family  Planned Readmission: No    Discharge Statement   I spent 25 minutes discharging the patient  This time was spent on the day of discharge  I had direct contact with the patient on the day of discharge  Additional documentation is required if more than 30 minutes were spent on discharge

## 2022-07-22 NOTE — PROGRESS NOTES
Progress Note - Saintclair Schmitt 77 y o  female MRN: [id number]    Unit/Bed#: [facility] 410-01 Encounter: [id number]      Assessment:  78 yo female with LLL adenocarcinoma s/p robotic LLL wedge/MLND on 7/21    AVSS  Uo-1 3L, 2x unmeasure  CT- 100 cc SS, no air leak on Water seal    WBC 11 45  Hgb 14 0  Cr 1 06    Plan:  -Continue regular diet  -DC IVF  -DC chest tube today  -IS 10x per hour and aggressive pulmonary toliet  -OOB and ambulate  -Pain/nausea control prn  -dvt ppx    Subjective:   No acute events overnight  Pain is well controlled  Pulling 500 on IS    Objective:     Vitals: Blood pressure 142/79, pulse 95, temperature 97 7 °F (36 5 °C), temperature source Oral, resp  rate 18, height 5' 4" (1 626 m), weight 70 3 kg (155 lb), SpO2 99 %, not currently breastfeeding  ,Body mass index is 26 61 kg/m²  Intake/Output Summary (Last 24 hours) at 7/22/2022 5728  Last data filed at 7/22/2022 0515  Gross per 24 hour   Intake 2715 ml   Output 1540 ml   Net 1175 ml       Physical Exam:   General: NAD  HENT: NCAT MMM  Neck: supple, no JVD  CV: nl rate  Chest: L chest tube in place to WS no AL  SS output  Incisions c/d/i  Lungs: nl wob  No resp distress  ABD: Soft, nontender, nondistended  Extrem: No CCE  Neuro: AAOx3       Invasive Devices  Report    Peripheral Intravenous Line  Duration           Peripheral IV 07/21/22 Dorsal (posterior); Left Hand <1 day    Peripheral IV 07/21/22 Left Forearm <1 day          Drain  Duration           Chest Tube 1 Left Pleural 28 Fr  <1 day

## 2022-07-22 NOTE — UTILIZATION REVIEW
Inpatient Admission Authorization Request   NOTIFICATION OF INPATIENT ADMISSION/INPATIENT AUTHORIZATION REQUEST   SERVICING FACILITY:   [facility]  Address: [address] [place] [address], [address] [place] [address]  Tax ID: [id number]  NPI: [id number]  Place of Service: Inpatient [address] [facility] Code: 24     ATTENDING PROVIDER:  Attending Name and NPI#: Esme BhaktaRemi stewart [[id number]]  Address: [address] [place] [address], [address] [place] [address]  Phone: [phone]     UTILIZATION REVIEW CONTACT:  [facility] Utilization   Network Utilization Review Department  Phone: [phone]  Fax: [phone]  Email: [email or web address]     PHYSICIAN ADVISORY SERVICES:  FOR [facility] REVIEW - MEDICAL NECESSITY DENIAL  Phone: [phone]  Fax: [phone]  Email: [email or web address]     TYPE OF REQUEST:  Inpatient Status     ADMISSION INFORMATION:  ADMISSION DATE/TIME: 7/21/22  6:46 PM  PATIENT DIAGNOSIS CODE/DESCRIPTION:  Adenocarcinoma of lung, unspecified laterality ([facility] ) [C34 90]  DISCHARGE DATE/TIME: No discharge date for patient encounter  IMPORTANT INFORMATION:  Please contact [facility] directly with any questions or concerns regarding this request  Department voicemails are confidential     Send requests for admission clinical reviews, concurrent reviews, approvals, and administrative denials due to lack of clinical to fax [phone]

## 2022-07-22 NOTE — DISCHARGE INSTRUCTIONS
Gently wash your incisions daily with soap and water, do not soak in a tub  Do not apply any lotions, creams, or ointments to incisions  No lifting over 10 lbs or strenuous exercise  No driving until seen at your post operative visit  The blue stitch will be removed at your post operative visit  Please obtain a pa/lat chest xray at a [facility] within 3 days of your follow up visit  Please call the office first if you have any questions or concerns during your post op period, prior to going to the emergency room or urgent care  You will be prescribed 2 medications to take at home, that should be taken exactly as directed  These have been shown to greatly improve post-operative pain:     Gabapentin 300mg tablet  Take 1 tablet by mouth 3x a day for 3 weeks  2    Tylenol 325mg tablet  Take 2 tablets by mouth, every 6 hours, for 1 week  You will also be prescribed a medication that you may take on an as needed basis:  Oxycodone 5mg tablet  Take 1-2 tablets every 4 hours as needed for pain

## 2022-07-22 NOTE — UTILIZATION REVIEW
Initial Clinical Review    Elective IP surgical procedure  Age/Sex: 77 y o  female  Surgery Date:  7/21/2022  Procedure:   BRONCHOSCOPY FLEXIBLE   RESECTION,LUNG WEDGE THORACOSCOPIC W/ ROBOTICS, w/ mediastinal lymph node dissection (Left)   1  Robotic left lower lobe therapeutic wedge resection  2  Mediastinal lymph node dissection  3  Bronchoscopy  4  Left T3 through T10 intercostal nerve block with Exparel  Anesthesia: General  Operative Findings: No definite evidence of lymph node involvement  Able to remove left lower lobe cancer with generous wedge resection  POD#1 Progress Note: No acute events overnight  Pain is well controlled  Pulling 500 on IS   AVSS  Uo-1 3L, 2x unmeasured  CT- 100 cc SS, no air leak on water seal  Continue regular diet  D/c IVFs  D/c chest tube today  Incentive spirometry, aggressive pulm toilet  OOB/ambualte  Pain/nausea control prn  SCD  s       Admission Orders: Date/Time/Statement:   Admission Orders (From admission, onward)     Ordered        07/21/22 1846  Inpatient Admission  Once                      Orders Placed This Encounter   Procedures    Inpatient Admission     Standing Status:   Standing     Number of Occurrences:   1     Order Specific Question:   Level of Care     Answer:   Med Surg [16]     Order Specific Question:   Bed request comments     Answer:   p4     Order Specific Question:   Estimated length of stay     Answer:   More than 2 Midnights     Order Specific Question:   Certification     Answer:   I certify that inpatient services are medically necessary for this patient for a duration of greater than two midnights  See H&P and MD Progress Notes for additional information about the patient's course of treatment       Vital Signs:   Date/Time Temp Pulse Resp BP MAP (mmHg) SpO2 Calculated FIO2 (%) - Nasal Cannula O2 Flow Rate (L/min) Nasal Cannula O2 Flow Rate (L/min) O2 Device Patient Position - Orthostatic VS   07/22/22 0802 -- -- -- -- -- 95 % -- -- -- None (Room air) --   07/22/22 0747 98 5 °F (36 9 °C) 86 18 130/87 97 95 % -- -- -- -- Sitting   07/22/22 0005 -- -- 18 -- -- -- -- -- -- -- --   07/21/22 2220 97 7 °F (36 5 °C) 95 -- 142/79 104 99 % -- -- -- None (Room air) Lying   07/21/22 2120 97 4 °F (36 3 °C) Abnormal  103 19 118/94 103 99 % -- -- -- -- Lying   07/21/22 2115 -- -- -- -- -- -- 28 -- 2 L/min Nasal cannula --   07/21/22 2100 -- -- 18 141/84 104 98 % 28 -- 2 L/min Nasal cannula --   07/21/22 2045 -- 86 18 131/74 93 98 % 28 -- 2 L/min Nasal cannula --   07/21/22 2030 97 °F (36 1 °C) Abnormal  84 20 138/78 96 95 % 28 -- 2 L/min Nasal cannula --   07/21/22 2000 -- 76 16 149/86 116 96 % -- -- -- -- --   07/21/22 1945 -- 78 16 149/86 116 96 % 28 -- 2 L/min Nasal cannula --   07/21/22 1930 -- 76 16 145/74 96 95 % -- -- -- -- --   07/21/22 1915 97 2 °F (36 2 °C) Abnormal  77 16 151/87 102 98 % -- 6 L/min -- Simple mask --   07/21/22 1305 98 4 °F (36 9 °C) 91 18 132/72 -- 98 % -- -- -- None (Room air) --       Pertinent Labs/Diagnostic Test Results:   XR chest portable   Final Result by Debora Garozn MD (07/21 2024)   Left chest tube in place with no pneumothorax  No acute cardiopulmonary disease              Results from last 7 days   Lab Units 07/22/22  0515   WBC Thousand/uL 11 45*   HEMOGLOBIN g/dL 14 0   HEMATOCRIT % 45 8   PLATELETS Thousands/uL 168     Results from last 7 days   Lab Units 07/22/22  0515   SODIUM mmol/L 141   POTASSIUM mmol/L 4 1   CHLORIDE mmol/L 110*   CO2 mmol/L 22   ANION GAP mmol/L 9   BUN mg/dL 11   CREATININE mg/dL 1 06   EGFR ml/min/1 73sq m 51   CALCIUM mg/dL 8 9   MAGNESIUM mg/dL 2 1     Results from last 7 days   Lab Units 07/22/22  0515   GLUCOSE RANDOM mg/dL 175*     Scheduled Medications:  acetaminophen, 975 mg, Oral, Q6H  enoxaparin, 40 mg, Subcutaneous, Daily  levothyroxine, 75 mcg, Oral, Early Morning  loratadine, 10 mg, Oral, Daily    PRN Meds:  HYDROmorphone, 0 5 mg, Intravenous, Q4H PRN  ondansetron, 4 mg, Intravenous, Q6H PRN 7/21 x1  oxyCODONE, 2 5 mg, Oral, Q4H PRN  oxyCODONE, 5 mg, Oral, Q4H PRN        Network Utilization Review Department  ATTENTION: Please call with any questions or concerns to [phone] and carefully listen to the prompts so that you are directed to the right person  All voicemails are confidential   Wilver Schwab all requests for admission clinical reviews, approved or denied determinations and any other requests to dedicated fax number below belonging to the campus where the patient is receiving treatment   List of dedicated fax numbers for the Facilities:  [address] DENIALS (Administrative/Medical Necessity) [phone]   [address] (Maternity/NICU/Pediatrics) [phone]   [address]  [address] [facility] [place] [address] [address] [place] [address] Olga Figue[address] [place] [address] [phone]

## 2022-07-22 NOTE — PHYSICAL THERAPY NOTE
Physical Therapy Evaluation     Patient's Name: Darwin Woodall    Admitting Diagnosis  Adenocarcinoma of lung, unspecified laterality ([place] [address] ) [C34 [id number]]    Problem List  Patient Active Problem List   Diagnosis    History of colon polyps    History of Helicobacter pylori infection    Dyspepsia    Hypothyroidism    Postoperative hypothyroidism    Other pulmonary embolism without acute cor pulmonale ([facility] )    Sensorineural hearing loss (SNHL) of both ears    Mixed conductive and sensorineural hearing loss of left ear with restricted hearing of right ear    Globus sensation    Other dysphagia    DORIS (obstructive sleep apnea)    Obstructive lung disease (HCC)    Chronic deep vein thrombosis (DVT) of lower leg (HCC)    Left arm swelling    Syncope    Abnormal chest CT    Vaginal itching    Possible exposure to STD    Adenocarcinoma of lung [facility])       Past Medical History  Past Medical History:   Diagnosis Date    Adenocarcinoma of lung ([place] [address] ) 07/13/2022    Arthritis     Borderline diabetes     diet controlled    Cancer ([place] [address] )     Colon polyp     CPAP (continuous positive airway pressure) dependence     refuses to use    Depression     Disease of thyroid gland     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    DVT (deep venous thrombosis) (HCC)     hx- left leg    GERD (gastroesophageal reflux disease)     PONV (postoperative nausea and vomiting)     Pulmonary emboli (HCC)     during pregnancy    Rash     arms on occasion, cause unknown    Sleep apnea        Past Surgical History  Past Surgical History:   Procedure Laterality Date    COLONOSCOPY      DILATION AND CURETTAGE, DIAGNOSTIC / THERAPEUTIC      IR BIOPSY LUNG  6/15/2022    THYROIDECTOMY, PARTIAL      2020          07/22/22 0857   PT Last Visit   PT Visit Date 07/22/22   Note Type   Note type Evaluation   Pain Assessment   Pain Assessment Tool FLACC   Pain Location/Orientation Orientation: Left; Location: Incision; Location: Chest   Pain Onset/Description Onset: Ongoing;Frequency: Intermittent; Descriptor: Aching;Descriptor: Discomfort   Effect of Pain on Daily Activities min guarding   Patient's Stated Pain Goal No pain   Hospital Pain Intervention(s) Repositioned; Ambulation/increased activity; Emotional support   Pain Rating: FLACC (Rest) - Face 0   Pain Rating: FLACC (Rest) - Legs 0   Pain Rating: FLACC (Rest) - Activity 0   Pain Rating: FLACC (Rest) - Cry 0   Pain Rating: FLACC (Rest) - Consolability 0   Score: FLACC (Rest) 0   Pain Rating: FLACC (Activity) - Face 1   Pain Rating: FLACC (Activity) - Legs 0   Pain Rating: FLACC (Activity) - Activity 0   Pain Rating: FLACC (Activity) - Cry 0   Pain Rating: FLACC (Activity) - Consolability 1   Score: FLACC (Activity) 2   Restrictions/Precautions   Braces or Orthoses   (denies)   Other Precautions Multiple lines;Telemetry  (CT)   Home Living   Type of Home Apartment   Home Layout One level;Elevator   Prior Function   Level of [place] Independent with ADLs and functional mobility  (amb w/o AD)   Lives With Alone   Receives Help From Family;Friend(s)   Falls in the last 6 months 0   General   Additional Pertinent History cleared for assessment (spoke to nsg)   Cognition   Overall Cognitive Status WFL   Arousal/Participation Alert   Attention Within functional limits   Orientation Level Oriented to person;Oriented to place;Oriented to situation   Following Commands Follows one step commands without difficulty   Subjective   Subjective Alert; in the chair; agreeable to mobilize   RUE Assessment   RUE Assessment WFL  (AROM)   LUE Assessment   LUE Assessment WFL  (AROM; reports hx of shld fx; denies WB or lifting restrictions)   RLE Assessment   RLE Assessment WFL  (AROM)   Strength RLE   RLE Overall Strength   (good - (grossly))   LLE Assessment   LLE Assessment WFL  (AROM)   Strength LLE   LLE Overall Strength   (good - (grossly))   Transfers   Sit to Stand 6  Modified independent   Stand to Sit 6  Modified independent   Ambulation/Elevation Gait pattern Excessively slow; Short stride; Inconsistent francisco   Gait Assistance 5  Supervision   Additional items Assist x 1;Verbal cues; Tactile cues   Assistive Device Rolling walker   Distance 260 ft   Balance   Static Sitting Good   Dynamic Sitting Fair +   Static Standing Fair   Dynamic Standing Fair -   Ambulatory Fair -   Activity Tolerance   Activity Tolerance Patient tolerated treatment well   Medical Staff Made Aware Co-eval performed w/ OTR due to complexity of medical status and multiple comorbidities   Nurse Made Aware [facility] [address] [place] [address]   Assessment   Prognosis Good   Problem List Decreased strength;Decreased endurance; Impaired balance;Decreased mobility   Assessment Pt is 77 y o  female admitted with Dx of Adenocarcinoma of lung and underwent Robotic left lower lobe therapeutic wedge resection, Mediastinal lymph node dissection, Bronchoscopy and Left T3 through T10 intercostal nerve block with Exparel on 7/21/2022  Pt 's comorbidities affecting POC include: Arthritis, Borderline diabetes, CPAP (continuous positive airway pressure) dependence, Depression, DVT (deep venous thrombosis) ([facility] ), Pulmonary emboli (HCC) Sleep apnea and pt reported hx of fx of (L) shld (denies ortho restrictions) and personal factors of: living alone  Pt's clinical presentation is currently  unstable/unpredictable which is evident in ongoing telem monitoring and CT in place  Pt presents w/ min postop guarding, min overall weakness, decreased endurance and inconsistent amb balance and gait patterns requiring use of rw at this time  Will cont to follow pt in PT for progressive mobilization to max level of (I), endurance, and safety  Otherwise, anticipate pt will return home w/ available support upon D/C when medically cleared; home PT follow up and rw are recommended at this time; will follow  Goals   Patient Goals to feel better   STG Expiration Date 08/01/22   Short Term Goal #1 7-10 days   Pt will amb 400 ft w/ least restrictive assistive device PRN, mod (I) in order to facilitate safe return to community amb status  Pt will achieve (I) level w/ bed mob in order to facilitate safety with OOB and back to bed transitions in own living environment  Pt will participate in LE therex and balance activities to max progression w/ mobility skills  PT Treatment Day 0   Plan   Treatment/Interventions LE strengthening/ROM; Therapeutic exercise; Endurance training;Equipment eval/education; Bed mobility;Gait training;Spoke to nursing;Spoke to case management;OT   PT Frequency Other (Comment)  (3-6x/wk)   Recommendation   PT Discharge Recommendation Home with home health rehabilitation  (home PT)   Equipment Recommended [facility] Recommended Wheeled walker   Olga Nance 435   Turning in Bed Without Bedrails 4   Lying on Back to Sitting on Edge of Flat Bed 3   Moving Bed to Chair 3   Standing Up From Chair 4   Walk in Room 3   Climb 3-5 Stairs 3   Basic Mobility Inpatient Raw Score 20   Basic Mobility Standardized Score 43 99   Highest Level Of Mobility   JH-HLM Goal 6: Walk 10 steps or more   JH-HLM Achieved 8: Walk 250 feet ot more   Modified Art Scale   Modified [place] Scale 3   End of Consult   Patient Position at End of Consult Bedside chair; All needs within reach         [facility], PT

## 2022-07-22 NOTE — RESPIRATORY THERAPY NOTE
RT Protocol Note  Boubacar Dee 77 y o  female MRN: [id number]  Unit/Bed#: [facility] 410-01 Encounter: [id number]    Assessment    Principal Problem:    Adenocarcinoma of lung [facility])      Home Pulmonary Medications:  N/A       Past Medical History:   Diagnosis Date    Adenocarcinoma of lung ([address] ) 07/13/2022    Arthritis     Borderline diabetes     diet controlled    Cancer (HCC)     Colon polyp     CPAP (continuous positive airway pressure) dependence     refuses to use    Depression     Disease of thyroid gland     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    DVT (deep venous thrombosis) (HCC)     hx- left leg    GERD (gastroesophageal reflux disease)     PONV (postoperative nausea and vomiting)     Pulmonary emboli (HCC)     during pregnancy    Rash     arms on occasion, cause unknown    Sleep apnea      Social History     Socioeconomic History    Marital status:      Spouse name: None    Number of children: None    Years of education: None    Highest education level: None   Occupational History    None   Tobacco Use    Smoking status: Never Smoker    Smokeless tobacco: Never Used   Vaping Use    Vaping Use: Never used   Substance and Sexual Activity    Alcohol use: Yes     Comment: social rare    Drug use: Never    Sexual activity: Yes     Partners: Male   Other Topics Concern    None   Social History Narrative    None     Social Determinants of Health     Financial Resource Strain: Not on file   Food Insecurity: Not on file   Transportation Needs: Not on file   Physical Activity: Not on file   Stress: Not on file   Social Connections: Not on file   Intimate Partner Violence: Not on file   Housing Stability: Not on file       Subjective    Subjective Data: pt currently in no resp distress  Pt states no SOB or increased WOB at this time  BS clear/diminished, SpO2 98% on 2L nasal cannula  No resp UDNs indicated at this time   Pt to continue IS    Objective    Physical Exam:   Assessment Type: Assess only  General Appearance: Alert, Awake  Respiratory Pattern: Spontaneous  Chest Assessment: Chest expansion symmetrical  Bilateral Breath Sounds: Clear, Diminished  O2 Device: nasal cannula    Vitals:  Blood pressure 142/79, pulse 95, temperature 97 7 °F (36 5 °C), temperature source Oral, resp  rate 19, height 5' 4" (1 626 m), weight 70 3 kg (155 lb), SpO2 99 %, not currently breastfeeding  Imaging and other studies: I have personally reviewed pertinent reports  O2 Device: nasal cannula     Plan: no resp UDNs indicated at this time  Pt to continue IS             Resp Comments: pt assessed per airway clearance protocol  Pt presents with adeoncarcinoma of lung, s/p lung resection  PMH: lung ca   No home resp meds, no smoking hx

## 2022-07-22 NOTE — PROGRESS NOTES
Post op check: Thoracic    Assessment   Patient is a 77 y o  female who presented with left lower, now status post robotic left lower lobe wedge resection and mediastinal lymph node dissection  Left chest tube:  -8, -AL, 50cc SS o/p    Postop x-ray reveals no pneumothorax     Plan:  -regular diet  -mIVF at 60   -pain control as needed   -left chest tube; -8 on topaz    Subjective: Pt groggy but arousable with no active complaints  Pain is well controlled  Denies fevers/chills, sob  Objective:  Vitals:    07/21/22 2120   BP: 118/94   Pulse: 103   Resp: 19   Temp: (!) 97 4 °F (36 3 °C)   SpO2: 99%       I/O this shift:  In: 1300 [I V :1300]  Out: 20 [Blood:20]    Scheduled Meds:  Current Facility-Administered Medications   Medication Dose Route Frequency Provider Last Rate    acetaminophen  975 mg Oral Q6H MD Tala Ospina ON 7/22/2022] enoxaparin  40 mg Subcutaneous Daily Silvia Guo MD      HYDROmorphone  0 5 mg Intravenous Q4H PRN Silvia Guo MD      lactated ringers  60 mL/hr Intravenous Continuous Silvia Guo MD 60 mL/hr (07/21/22 2019)    [START ON 7/22/2022] levothyroxine  75 mcg Oral Early Morning MD Tala Ospina ON 7/22/2022] loratadine  10 mg Oral Daily Silvia Guo MD      ondansetron  4 mg Intravenous Q6H PRN Silvia Guo MD      oxyCODONE  2 5 mg Oral Q4H PRN Silvia Guo MD      oxyCODONE  5 mg Oral Q4H PRN Silvia Guo MD       Continuous Infusions:lactated ringers, 60 mL/hr, Last Rate: 60 mL/hr (07/21/22 2019)      PRN Meds:   HYDROmorphone    ondansetron    oxyCODONE    oxyCODONE      Physical Exam   Gen:  Well-developed, well-nourished female in NAD  HEENT: EOMI  Chest:  Left sided chest tube in place as above  CV: RRR,   Lungs: Normal respiratory effort  Abd: soft, nontender, nondistended  Neuro:  AxO x3    Madison Montenegro MD

## 2022-07-22 NOTE — RESTORATIVE TECHNICIAN NOTE
Restorative Technician Note      Patient Name: Raul Valladares     Restorative Tech Visit Date: 7/22/2022  Note Type: Mobility  Patient Position Upon Consult: Bedside chair  Activity Performed: Ambulated  Assistive Device: Roller walker  Patient Position at End of Consult: All needs within reach;  Bedside chair

## 2022-07-22 NOTE — QUICK NOTE
Patient seen for left chest tube removal  Tube removed at peak inspiration without issue  Topaz unit #5 processed and placed in pphp4 soiled utility room with paper cord  Chest tube site sutured, dressed with gauze and Tegaderm  Patient tolerated well and with no issues       -PA/LAt CXR today  -remove dressing 7/23  -suture removal at office follow-up

## 2022-07-22 NOTE — OCCUPATIONAL THERAPY NOTE
Occupational Therapy Evaluation     Patient Name: Ilir Flores  Today's Date: 7/22/2022  Problem List  Principal Problem:    Adenocarcinoma of lung [facility])    Past Medical History  Past Medical History:   Diagnosis Date    [facility]enocarcinoma [facility] lung ([facility] ) 07/13/2022    Arthritis     Borderline diabetes     diet controlled    Cancer ([facility] )     Colon polyp     CPAP (continuous positive airway pressure) dependence     refuses to use    Depression     Disease of thyroid gland     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    DVT (deep venous thrombosis) (HCC)     hx- left leg    GERD (gastroesophageal reflux disease)     PONV (postoperative nausea and vomiting)     Pulmonary emboli (HCC)     during pregnancy    Rash     arms on occasion, cause unknown    Sleep apnea      Past Surgical History  Past Surgical History:   Procedure Laterality Date    COLONOSCOPY      DILATION AND CURETTAGE, DIAGNOSTIC / THERAPEUTIC      IR BIOPSY LUNG  6/15/2022    THYROIDECTOMY, PARTIAL      2020             07/22/22 0928   OT Last Visit   OT Visit Date 07/22/22   Note Type   Note type Evaluation   Restrictions/Precautions   Other Precautions Multiple lines;Telemetry  (CT)   Pain Assessment   Pain Assessment Tool 0-10   Pain Score No Pain   Home Living   Type of Home Apartment   Home Layout Elevator   Bathroom Shower/Tub Tub/shower unit   Bathroom Toilet Standard   Bathroom Equipment Grab bars in shower   [address]   (denies)   Additional Comments Pt reports living in an apartment with 0 [address] and elevator access     Prior Function   Level of [place] Independent with ADLs and functional mobility   Lives With Alone   Receives Help From Family;Friend(s)   ADL Assistance Independent   IADLs Independent   Falls in the last 6 months 0   Vocational Retired   Lifestyle   Autonomy Pt reports being I with ADLS, IADLS and mobility without device PTA  (+)    Reciprocal Relationships Pt lives alone but reports that she has supportive friends and family able to assist upon d/c   Service to Others Retired LPN   Intrinsic Gratification Enjoys browsing the internet   ADL   Where Assessed Chair   Eating Assistance 7  [facility] [address]  [address] [place] [address]  5  [address] [place] [address]  5  [address]  5  [address]  66  5  [address]  5  Supervision/Setup   Transfers   Sit to [address]   Additional items Increased time required   Stand to [address]   Additional items Increased time required   Functional Mobility   Functional Mobility 5  Supervision   Additional Comments Pt demonstrated household mobility with no rest breaks  Additional items Rolling walker   Balance   Static Sitting Good   Dynamic Sitting Fair +   Static Standing Fair   Dynamic Standing Fair   Ambulatory Fair -   Activity Tolerance   Activity Tolerance Patient limited by fatigue   Medical Staff Made Aware Seen with PT 2* medical complexity/ instability   Nurse Made Aware RN confirmed okay to see pt   RUE Assessment   RUE Assessment WFL   LUE Assessment   LUE Assessment WFL   Cognition   Overall Cognitive Status WFL   Arousal/Participation Alert; Cooperative   Attention Within functional limits   Orientation Level Oriented X4   Memory Within functional limits   Following Commands Follows one step commands without difficulty   Comments Pt is pleasant and cooperative  Assessment   Assessment Pt is a 77 y o  female admitted to B on 7/21/2022 w/ adenocarcinoma of lung s/p "RESECTION,LUNG WEDGE THORACOSCOPIC W/ ROBOTICS, w/ mediastinal lymph node dissection (Left) " on 7/21/22   Pt  has a past medical history of Adenocarcinoma of lung ([facility] ) (07/13/2022), Arthritis, Borderline diabetes, Cancer ([facility] ), Colon polyp, CPAP (continuous positive airway pressure) dependence, Depression, Disease of thyroid gland, DVT (deep venous thrombosis) ([address] ), GERD (gastroesophageal reflux disease), PONV (postoperative nausea and vomiting), Pulmonary emboli ([facility] ), Rash, and Sleep apnea  Pt with active OT orders and ambulate  orders  Pt resides in an apartment with elevator access  Pt lives alone but reports that she has supportive friends and family able to assist upon d/c  Pt was I w/  ADLS and IADLS, (+) drove, & required no use of DME PTA  Currently pt is supervision for functional transfers, functional mobility and ADLS overall  Based on the aforementioned OT evaluation, functional performance deficits, and assessments, pt has been identified as a moderate complexity evaluation  From OT standpoint, anticipate d/c home with family support  Recommend continued participation in [facility] and functional mobility with staff  No further acute OT needs, d/c OT     Goals   Patient Goals To feel better and return home   Recommendation   OT Discharge Recommendation No rehabilitation needs  (Home with increased social support)   AM-PAC Daily Activity Inpatient   Lower Body Dressing 4   Bathing 4   Toileting 4   Upper Body Dressing 4   Grooming 4   Eating 4   Daily Activity Raw Score 24   Daily Activity Standardized Score (Calc for Raw Score >=11) 57 54   AM-PAC Applied Cognition Inpatient   Following a Speech/Presentation 4   Understanding Ordinary Conversation 4   Taking Medications 4   Remembering Where Things Are Placed or Put Away 4   Remembering List of 4-5 Errands 4   Taking Care of Complicated Tasks 4   Applied Cognition Raw Score 24   Applied Cognition Standardized Score 62 21   Modified [place] Scale   Modified [place] Scale 3       Nerissa Lyles, MOT, OTR/L

## 2022-07-22 NOTE — PROGRESS NOTES
Pt is pretty sleepy when transferred from PACU but is arousable  Was given phenergan before coming up   Will monitor closely

## 2022-07-22 NOTE — PROGRESS NOTES
Progress Note - Thoracic Surgery   Saul Casas 77 y o  female MRN: [id number]  Unit/Bed#: [facility] 410-01 Encounter: [id number]    Assessment:  77 F p/w LLL adenocarcinoma s/p robotically assisted left lower lobe wedge resection and mediastinal lymphadenectomy on 07/21  VS:  AVSS, room air    UOP:  Multiple unrecorded voids    Within/22 PA/lateral x-ray following chest tube removal were reviewed, tiny left apical pneumothorax, no large residual effusion       Plan:  -diet as tolerated  -pain control  -of bed, ambulate  -pulmonary toilet, incentive spirometry  -DVT prophylaxis  -PT/OT: Home with home health  -disposition planning:  Anticipate discharge to home later today    Subjective/Objective       Subjective:  Reporting some chest wall pain overnight, mild anxiety about returning home as she lives alone  Continues to do well, comfortable on room air, compliant with incentive spirometry, pain improved with medication    Objective:     Blood pressure 121/59, pulse 86, temperature 98 2 °F (36 8 °C), temperature source Oral, resp  rate 17, height 5' 4" (1 626 m), weight 71 3 kg (157 lb 3 oz), SpO2 94 %, not currently breastfeeding  ,Body mass index is 26 98 kg/m²  Intake/Output Summary (Last 24 hours) at 7/23/2022 0623  Last data filed at 7/22/2022 1832  Gross per 24 hour   Intake 1040 ml   Output --   Net 1040 ml       Invasive Devices  Report    Peripheral Intravenous Line  Duration           Peripheral IV 07/21/22 Dorsal (posterior); Left Hand 1 day                Physical Exam:     General:  No acute distress, appears anxious  HEENT:  Normocephalic, atraumatic  Cardiovascular:  Regular rate, regular rhythm  Respiratory:  No distress, room air  Chest:  Incisions C/D/I, chest tube removal site cleanly dressed  Abdomen:  Soft, nontender  Extremities:  No clubbing, cyanosis, or edema  Neuro:  AAO x3  Skin:  Warm, dry      Lab, Imaging and other studies:  No labs this a m    VTE Pharmacologic Prophylaxis: Enoxaparin VISHAL spoke with the pt dtr-n-law and she states that she spoke with a dtr yesterday and requested that the pt be dc'ed tomorrow when they have visiting angels in the home  She states that the doc was agreeable to the dc being tomorrow  CM reached out to nursing and SLIM, Rudolph RODGERS also reached out to SLETs to delay transport until tomorrow  VISHAL spoke with Diomedes Pappas and the pt will be able to transport tomorrow at 10am     The pt dtr-n-law is aware of the 10am transport  (Lovenox)  VTE Mechanical Prophylaxis: sequential compression device

## 2022-07-23 PROCEDURE — 99024 POSTOP FOLLOW-UP VISIT: CPT | Performed by: THORACIC SURGERY (CARDIOTHORACIC VASCULAR SURGERY)

## 2022-07-23 RX ORDER — POLYETHYLENE GLYCOL 3350 17 G/17G
17 POWDER, FOR SOLUTION ORAL DAILY
Status: DISCONTINUED | OUTPATIENT
Start: 2022-07-23 | End: 2022-07-24 | Stop reason: HOSPADM

## 2022-07-23 RX ADMIN — ACETAMINOPHEN 975 MG: 325 TABLET ORAL at 12:52

## 2022-07-23 RX ADMIN — POLYETHYLENE GLYCOL 3350 17 G: 17 POWDER, FOR SOLUTION ORAL at 12:52

## 2022-07-23 RX ADMIN — ACETAMINOPHEN 975 MG: 325 TABLET ORAL at 20:41

## 2022-07-23 RX ADMIN — ENOXAPARIN SODIUM 40 MG: 40 INJECTION SUBCUTANEOUS at 08:09

## 2022-07-23 RX ADMIN — OXYCODONE HYDROCHLORIDE 5 MG: 5 TABLET ORAL at 12:52

## 2022-07-23 RX ADMIN — LEVOTHYROXINE SODIUM 75 MCG: 75 TABLET ORAL at 05:07

## 2022-07-23 RX ADMIN — LORATADINE 10 MG: 10 TABLET ORAL at 08:09

## 2022-07-23 RX ADMIN — OXYCODONE HYDROCHLORIDE 5 MG: 5 TABLET ORAL at 03:08

## 2022-07-23 RX ADMIN — HYDROMORPHONE HYDROCHLORIDE 0.5 MG: 1 INJECTION, SOLUTION INTRAMUSCULAR; INTRAVENOUS; SUBCUTANEOUS at 05:09

## 2022-07-23 RX ADMIN — ACETAMINOPHEN 975 MG: 325 TABLET ORAL at 08:09

## 2022-07-23 NOTE — CASE MANAGEMENT
Case Management Discharge Planning Note    Patient name Linnette Cheung  Location [facility] 303-98 MRN [id number]  : 1945 Date 2022       Current Admission Date: 2022  Current Admission Diagnosis:Adenocarcinoma of lung [facility])   Patient Active Problem List    Diagnosis Date Noted    Adenocarcinoma of lung ([facility] ) 2022    Vaginal itching 2022    Possible exposure to STD 2022    Abnormal chest CT 2022    Syncope 2022    Chronic deep vein thrombosis (DVT) of lower leg ([facility] ) 2022    Left arm swelling 2022    Obstructive lung disease ([facility] ) 2021    DORIS (obstructive sleep apnea)     Sensorineural hearing loss (SNHL) of both ears 2021    Mixed conductive and sensorineural hearing loss of left ear with restricted hearing of right ear 2021    Globus sensation 2021    Other dysphagia 2021    Postoperative hypothyroidism 03/15/2021    Other pulmonary embolism without acute cor pulmonale ([facility] ) 03/15/2021    Hypothyroidism 01/15/2021    Dyspepsia 2019    History of colon polyps 2019    History of Helicobacter pylori infection 2019      LOS (days): 2  Geometric Mean LOS (GMLOS) (days): 2 60  Days to GMLOS:1     OBJECTIVE:  Risk of Unplanned Readmission Score: 11 89         Current admission status: Inpatient   Preferred Pharmacy:   [facility] [address] [address]  Phone: [phone] Fax: [phone]    [facility] [address]   [address] [place] [address]  Phone: [phone] Fax: [phone] [place] [address] 210 [facility]  Phone: [phone] Fax: [phone]    Primary Care Provider: Drew Hernández MD    Primary Insurance: [facility]  Secondary [facility]: [place] [facility]    DISCHARGE DETAILS:    Discharge planning discussed with[de-identified] Patient at bedside  Freedom of Choice: Yes    Were Treatment Team discharge recommendations reviewed with patient/caregiver?: Yes  Did patient/caregiver verbalize understanding of patient care needs?: Yes  Were patient/caregiver advised of the risks associated with not following Treatment Team discharge recommendations?: Yes    Chilo Carter requested[de-identified] Physical Therapy, [address] Name[de-identified] Other ([address] [place] [address])  [address] [place] [address] Provider[de-identified] PCP  [address] Needed[de-identified] Gait/ADL Training, Strengthening/Theraputic Exercises to Improve Function, Evaluate Functional Status and Safety  Homebound Criteria Met[de-identified] Requires the Assistance of Another Person for Safe Ambulation or to Leave the Home, Uses an Assist Device (i e  cane, walker, etc)  Supporting Clincal Findings[de-identified] Limited Endurance, Fatigues Easliy in [employer]    Other Referral/Resources/Interventions Provided:  Interventions: [facility]  Referral Comments: [facility] unable to accept at this time, referral to [facility] remains under review at this time, CM requested updated on referral status  a time later CM advised [address] [place] [address] can accept for home PT  [facility] added to AVS  CM informed of same and is in agreement with DCP  Per parashute communication RW order approved and is pending delivery ticket  Pt declined RW order at this time and requested order through [facility] for Rollator  RW order cancelled via parashute and order was placed for Rollator as requested by pt  CM will follow for order status  [facility] requested AVS faxed to F: [phone] upon d/c       Treatment Team Recommendation: Home with [address]  Discharge Destination Plan[de-identified] Home with [address] [facility] ([address] [place] [address])

## 2022-07-24 VITALS
WEIGHT: 155.42 LBS | SYSTOLIC BLOOD PRESSURE: 129 MMHG | HEART RATE: 93 BPM | RESPIRATION RATE: 16 BRPM | OXYGEN SATURATION: 94 % | BODY MASS INDEX: 26.53 KG/M2 | DIASTOLIC BLOOD PRESSURE: 66 MMHG | HEIGHT: 64 IN | TEMPERATURE: 98.4 F

## 2022-07-24 PROCEDURE — NC001 PR NO CHARGE: Performed by: THORACIC SURGERY (CARDIOTHORACIC VASCULAR SURGERY)

## 2022-07-24 PROCEDURE — 99024 POSTOP FOLLOW-UP VISIT: CPT | Performed by: THORACIC SURGERY (CARDIOTHORACIC VASCULAR SURGERY)

## 2022-07-24 RX ORDER — ACETAMINOPHEN 325 MG/1
650 TABLET ORAL EVERY 6 HOURS PRN
Qty: 60 TABLET | Refills: 0 | Status: SHIPPED | OUTPATIENT
Start: 2022-07-24 | End: 2022-07-25

## 2022-07-24 RX ADMIN — ENOXAPARIN SODIUM 40 MG: 40 INJECTION SUBCUTANEOUS at 10:13

## 2022-07-24 RX ADMIN — POLYETHYLENE GLYCOL 3350 17 G: 17 POWDER, FOR SOLUTION ORAL at 10:13

## 2022-07-24 RX ADMIN — ACETAMINOPHEN 975 MG: 325 TABLET ORAL at 10:13

## 2022-07-24 RX ADMIN — ACETAMINOPHEN 975 MG: 325 TABLET ORAL at 01:57

## 2022-07-24 RX ADMIN — LEVOTHYROXINE SODIUM 75 MCG: 75 TABLET ORAL at 05:47

## 2022-07-24 RX ADMIN — LORATADINE 10 MG: 10 TABLET ORAL at 10:13

## 2022-07-24 NOTE — CASE MANAGEMENT
Case Management Discharge Planning Note    Patient name Denisse Corrales  Location [facility] 095-23 MRN [id number]  : 1945 Date 2022       Current Admission Date: 2022  Current Admission Diagnosis:Adenocarcinoma of lung [facility])   Patient Active Problem List    Diagnosis Date Noted    Adenocarcinoma of lung ([facility] ) 2022    Vaginal itching 2022    Possible exposure to STD 2022    Abnormal chest CT 2022    Syncope 2022    Chronic deep vein thrombosis (DVT) of lower leg ([facility] ) 2022    Left arm swelling 2022    Obstructive lung disease ([facility] ) 2021    DORIS (obstructive sleep apnea)     Sensorineural hearing loss (SNHL) of both ears 2021    Mixed conductive and sensorineural hearing loss of left ear with restricted hearing of right ear 2021    Globus sensation 2021    Other dysphagia 2021    Postoperative hypothyroidism 03/15/2021    Other pulmonary embolism without acute cor pulmonale ([facility] ) 03/15/2021    Hypothyroidism 01/15/2021    Dyspepsia 2019    History of colon polyps 2019    History of Helicobacter pylori infection 2019      LOS (days): 3  Geometric Mean LOS (GMLOS) (days): 2 60  Days to GMLOS:0     OBJECTIVE:  Risk of Unplanned Readmission Score: 12 07         Current admission status: Inpatient   Preferred Pharmacy:   [facility] [address] [place] [address]  Phone: [phone] Fax: [phone]    [address] [place] [address], [address] [place] [address]   [address] [facility] [address]  Phone: [phone] Fax: [address] [place] [address] - [address] 210 [facility]  Phone: [phone] Fax: [phone]    Primary Care Provider: Larissa Kelley MD    Primary Insurance: Christina Carranza [facility]  Secondary Insurance: [employer]    DISCHARGE DETAILS:    Other Referral/Resources/Interventions Provided:  Interventions: [facility]  Referral Comments: Patient accepted by [address] [place] [address] AT [facility] for after care services: Home PT and HHA  [facility] 7/[facility]  AVS faxed to [address]: [phone] as requested  [facility] communication, order for rollator walker has been processed and CM was advised an [facility] health representative will be in contact with patient in order to schedule delivery  Pt informed and is in agreement with same  CM confirmed with pt, contact information and address on file for patient is correct  No further CM needs reported at this time       Treatment Team Recommendation: Home with [address]  Discharge Destination Plan[de-identified] Home with [address] [employer] ([address] [place] [address] health )  Transport at Discharge : Automobile, Family

## 2022-07-24 NOTE — PROGRESS NOTES
Thoracic Surgery  Progress Note   Wally Ho 77 y o  female MRN: [id number]  Unit/Bed#: [facility] 410-01 Encounter: [id number]    Assessment:  77 F p/w LLL adenocarcinoma s/p robotically assisted left lower lobe wedge resection and mediastinal lymphadenectomy on       VS:  AVSS, room air      Plan:   -discharge home today   -Pain control  -Out of bed, ambulate   -pulmonary toilet   -DVT prophylaxis    Subjective/Objective     Subjective: Patient seen and examined at bedside, in no acute distress  No acute events overnight  Patient's pain is well controlled  She denies nausea or vomiting  Endorses passing gas and stool  Objective:     Vitals:Blood pressure 142/74, pulse 94, temperature 99 °F (37 2 °C), temperature source Oral, resp  rate 19, height 5' 4" (1 626 m), weight 72 1 kg (158 lb 15 2 oz), SpO2 96 %, not currently breastfeeding  ,Body mass index is 27 28 kg/m²  Temp (24hrs), Av 7 °F (37 1 °C), Min:98 2 °F (36 8 °C), Max:99 2 °F (37 3 °C)  Current: Temperature: 99 °F (37 2 °C)    No intake or output data in the 24 hours ending 22 2341    Invasive Devices  Report    Peripheral Intravenous Line  Duration           Peripheral IV 22 Dorsal (posterior); Left Hand 2 days                Physical Exam:  General: No acute distress, alert and oriented  CV: Well perfused, regular rate and rhythm  Chest:  Incisions clean dry and intact  Lungs: Normal work of breathing, no increased respiratory effort  Abdomen: Soft, non-tender, non-distended  Extremities: No edema, clubbing or cyanosis  Skin: Warm, dry    Lab Results: Results: I have personally reviewed all pertinent laboratory/tests results  VTE Prophylaxis: Sequential compression device (Venodyne)  and Enoxaparin (Lovenox)    Disha Watts MD  2022

## 2022-07-25 ENCOUNTER — TELEPHONE (OUTPATIENT)
Dept: GYNECOLOGIC ONCOLOGY | Facility: CLINIC | Age: 77
End: 2022-07-25

## 2022-07-25 DIAGNOSIS — C34.92 ADENOCARCINOMA OF LEFT LUNG (HCC): ICD-10-CM

## 2022-07-25 RX ORDER — GABAPENTIN 300 MG/1
300 CAPSULE ORAL 3 TIMES DAILY
Qty: 63 CAPSULE | Refills: 0 | Status: SHIPPED | OUTPATIENT
Start: 2022-07-25

## 2022-07-25 RX ORDER — ACETAMINOPHEN 325 MG/1
650 TABLET ORAL EVERY 6 HOURS
Qty: 56 TABLET | Refills: 0 | Status: SHIPPED | OUTPATIENT
Start: 2022-07-25 | End: 2022-08-01

## 2022-07-25 RX ORDER — OXYCODONE HYDROCHLORIDE 5 MG/1
5 TABLET ORAL EVERY 4 HOURS PRN
Qty: 30 TABLET | Refills: 0 | Status: SHIPPED | OUTPATIENT
Start: 2022-07-25 | End: 2022-08-04

## 2022-07-25 NOTE — TELEPHONE ENCOUNTER
Patient left hospital too late to  meds  Needs to have [facility]  Patient spoke to [facility] and was told they would discontinue her meds for her

## 2022-07-25 NOTE — UTILIZATION REVIEW
Notification of Discharge   This is a Notification of Discharge from our facility [address]  Please be advised that this patient has been discharge from our facility  Below you will find the admission and discharge date and time including the patients disposition  UTILIZATION REVIEW CONTACT:  Rigo Menchaca  Utilization   Network Utilization Review Department  Phone: [phone] x carefully listen to the prompts  All voicemails are confidential   Email: [email or web address]     PHYSICIAN ADVISORY SERVICES:  FOR [id number] REVIEW - MEDICAL NECESSITY DENIAL  Phone: [phone]  Fax: [phone]  Email: [email or web address]     PRESENTATION DATE: 7/21/2022 12:11 PM  OBERVATION ADMISSION DATE:   INPATIENT ADMISSION DATE: 7/21/22  6:46 PM   DISCHARGE DATE: 7/24/2022  1:51 PM  DISPOSITION: Home with [place] with [address] INFORMATION:  Send all requests for admission clinical reviews, approved or denied determinations and any other requests to dedicated fax number below belonging to the campus where the patient is receiving treatment   List of dedicated fax numbers:  [address] DENIALS (Administrative/Medical Necessity) [phone]   [address] (Maternity/NICU/Pediatrics) [phone]   [facility] [phone]   [address] [place] [address] [phone]   [address] [place] [address] [phone]   [address] [place] [address] [address] [place] [address],[address] [phone]   [facility]  [phone]   [address] [place] [address], [facility]  2401 [facility] 1000 [facility] [phone]

## 2022-07-27 ENCOUNTER — DOCUMENTATION (OUTPATIENT)
Dept: SURGICAL ONCOLOGY | Facility: CLINIC | Age: 77
End: 2022-07-27

## 2022-07-27 ENCOUNTER — TELEPHONE (OUTPATIENT)
Dept: SURGICAL ONCOLOGY | Facility: CLINIC | Age: 77
End: 2022-07-27

## 2022-07-27 NOTE — PROGRESS NOTES
1  Constipation: (Yes: Colace 100 mg BID, Senokot 2 tabs QHS or Senokot S 2 tabs qhs   No: Dulcolax/Miralax/suppository/enema)  Denies         2  Pain Management: (Oxycodone 5-10 mg prn, Tylenol 650 mg q6 hrs and +/- Advil 600 mg TID for 7 days  Neurontin 300 mg TID for 21 days)  Pain controlled with Tylenol  Patient doesn't like to take Gabapentin except at night states"it makes her feel drugged"  3   Fever: (Chills? Call for temp >100 4 )  Denies      4  Cough: (Dry, productive?)  Occasional cough productive for blood tinged mucus mucus  Advised her to call for increase in blood in mucus  5   Shortness of breath: (At rest, with activity?)  SOB with activity      6  Appetite:   Good      7  Incisions: (Warmth, redness, drainage? May shower, no baths  No creams, lotions, or ointments to incisions)  Saw PA at PCP's office she told her incisions looked good  8   Ambulation/Incentive Spirometry: (Any activity? Use IS every 15 min) Using IS as directed  Advised patient to call for temp >100 4, chills, increased SOB or cough  Incisional redness, warmth or drainage    Reviewed post-op appointment date, time and need for CXR 1-3 days prior to appointment

## 2022-07-27 NOTE — TELEPHONE ENCOUNTER
Kaya Osborne called to report a temperature of 101 4  She denies chills, pain or burning with urination, sore throat or increase in cough or SOB  I discussed with Ayush Garvey PA-C she recommends patient get CXR in AM  Advised patient to drink plenty of fluids, she may take Tylenol  Patient to  to call office or go to ED  for Increased SOB, cough or continued elevated temperature

## 2022-07-28 ENCOUNTER — DOCUMENTATION (OUTPATIENT)
Dept: SURGICAL ONCOLOGY | Facility: CLINIC | Age: 77
End: 2022-07-28

## 2022-07-28 ENCOUNTER — TELEPHONE (OUTPATIENT)
Dept: SURGICAL ONCOLOGY | Facility: CLINIC | Age: 77
End: 2022-07-28

## 2022-07-28 ENCOUNTER — HOSPITAL ENCOUNTER (OUTPATIENT)
Dept: RADIOLOGY | Facility: HOSPITAL | Age: 77
Discharge: HOME | End: 2022-07-28
Payer: COMMERCIAL

## 2022-07-28 DIAGNOSIS — C34.92 ADENOCARCINOMA OF LEFT LUNG (HCC): ICD-10-CM

## 2022-07-28 PROCEDURE — 71046 X-RAY EXAM CHEST 2 VIEWS: CPT

## 2022-07-28 NOTE — TELEPHONE ENCOUNTER
I called Valeria Florian to give her x-ray results  Voice mail received, message left to call office

## 2022-07-28 NOTE — PROGRESS NOTES
I notified Svetlana Dodd of her CXR results  Joshua Galvin PA-C reviewed CXR  Patient feeling well this am temp  99 8 denies chills, increase in cough or SOB  Advised her to use IS frequently and increase activity level  She was told to call for temp  > 100 4, chills, increase in cough, SOB , incisional redness, warmth or drainage

## 2022-08-01 ENCOUNTER — TELEPHONE (OUTPATIENT)
Dept: GYNECOLOGIC ONCOLOGY | Facility: CLINIC | Age: 77
End: 2022-08-01

## 2022-08-01 ENCOUNTER — HOME HEALTH ADMISSION (OUTPATIENT)
Dept: HOME HEALTH SERVICES | Facility: HOME HEALTHCARE | Age: 77
End: 2022-08-01
Payer: COMMERCIAL

## 2022-08-01 DIAGNOSIS — C34.92 ADENOCARCINOMA OF LEFT LUNG (HCC): Primary | ICD-10-CM

## 2022-08-01 NOTE — CASE MANAGEMENT
Case Management Progress Note    Patient name Rich Bowden  Location [facility] 410/[facility] 487-72 MRN [id number]  : 1945 Date 2022       LOS (days): 3  Geometric Mean LOS (GMLOS) (days): 2 60  Days to GMLOS:-0 2        LATE ENTRY: 22 @ 10:00AM    On 22, CM was informed by Thoracic Surgery OSWALDO Germain that pt contacted her and reported she has still not had a VNA visit  CM researched and discovered that pt was supposed to have care w/ [facility]  CM contacted [facility] (p: [phone]) who reported they cancelled the referral due to not being in network w/ pt's insurance  This cancellation of care occurred after pt was already d/c'd and [facility] did not inform CM  CM contacted [facility] who reported they could accept pt for care with best start date of 8/3/22  CM informed Thoracic Surgery and pt of this start date, both were agreeable  CM made emergency AIDIN referral to [facility] which was promptly accepted by [facility]  Cristina Rich  CM updated pt's AVS to reflect pt's aftercare provider is CHRISTINA PETERSON to follow

## 2022-08-01 NOTE — TELEPHONE ENCOUNTER
Patient will have VNA/PT/OT  Ambulatory order placed  [facility] by [address] will be there this Wednesday  Patient aware  She was also advised to re start her Gabapentin, which will help with her side discomfort  She is in agreement with the plan

## 2022-08-01 NOTE — TELEPHONE ENCOUNTER
Patient is having upper chest discomfort  Patient had surgery with Dr Violet Ford 7/21/22 BRONCHOSCOPY FLEXIBLE (N/A Bronchus)       RESECTION,LUNG WEDGE THORACOSCOPIC W/ ROBOTICS, w/ mediastinal lymph node dissection (Left Chest)  Patient would like a phone call back to see if that is normal post op   [phone]

## 2022-08-01 NOTE — TELEPHONE ENCOUNTER
Patient called in regarding her now new diagnosis of COVID  Patient is scheduled to be seen with Dr Terri Luo on 8/10  Patient wants to know if she still needs an xray three days before her appt with him  Please advise   Thank you   [phone]

## 2022-08-02 ENCOUNTER — HOME CARE VISIT (OUTPATIENT)
Dept: HOME HEALTH SERVICES | Facility: HOME HEALTHCARE | Age: 77
End: 2022-08-02

## 2022-08-02 DIAGNOSIS — C34.92 ADENOCARCINOMA OF LEFT LUNG (HCC): Primary | ICD-10-CM

## 2022-08-02 NOTE — TELEPHONE ENCOUNTER
Patient had intermittent fevers and took a COVID test yesterday, which was positive  So she would have to be masked when she comes in next week and should get another cxr next week one day before her appointment  She was advised not come in for her appointment if she is feeling sick at all  She is not having any current fevers

## 2022-08-03 ENCOUNTER — HOME CARE VISIT (OUTPATIENT)
Dept: HOME HEALTH SERVICES | Facility: HOME HEALTHCARE | Age: 77
End: 2022-08-03

## 2022-08-03 ENCOUNTER — TELEPHONE (OUTPATIENT)
Dept: GYNECOLOGIC ONCOLOGY | Facility: CLINIC | Age: 77
End: 2022-08-03

## 2022-08-03 ENCOUNTER — HOSPITAL ENCOUNTER (OUTPATIENT)
Dept: INFUSION CENTER | Facility: HOSPITAL | Age: 77
Discharge: HOME | End: 2022-08-03
Payer: COMMERCIAL

## 2022-08-03 VITALS
SYSTOLIC BLOOD PRESSURE: 96 MMHG | OXYGEN SATURATION: 99 % | RESPIRATION RATE: 18 BRPM | HEART RATE: 71 BPM | TEMPERATURE: 97.1 F | DIASTOLIC BLOOD PRESSURE: 70 MMHG

## 2022-08-03 DIAGNOSIS — C34.92 ADENOCARCINOMA OF LEFT LUNG (HCC): ICD-10-CM

## 2022-08-03 PROCEDURE — M0222 HB BEBTELOVIMAB INJECTION: HCPCS | Performed by: INTERNAL MEDICINE

## 2022-08-03 RX ORDER — BEBTELOVIMAB 87.5 MG/ML
175 INJECTION, SOLUTION INTRAVENOUS ONCE
Status: COMPLETED | OUTPATIENT
Start: 2022-08-03 | End: 2022-08-03

## 2022-08-03 RX ORDER — ALBUTEROL SULFATE 90 UG/1
3 AEROSOL, METERED RESPIRATORY (INHALATION) ONCE AS NEEDED
Status: DISCONTINUED | OUTPATIENT
Start: 2022-08-03 | End: 2022-08-06 | Stop reason: HOSPADM

## 2022-08-03 RX ORDER — ONDANSETRON 2 MG/ML
4 INJECTION INTRAMUSCULAR; INTRAVENOUS ONCE AS NEEDED
Status: DISCONTINUED | OUTPATIENT
Start: 2022-08-03 | End: 2022-08-06 | Stop reason: HOSPADM

## 2022-08-03 RX ORDER — ACETAMINOPHEN 325 MG/1
650 TABLET ORAL ONCE AS NEEDED
Status: DISCONTINUED | OUTPATIENT
Start: 2022-08-03 | End: 2022-08-06 | Stop reason: HOSPADM

## 2022-08-03 RX ORDER — SODIUM CHLORIDE 9 MG/ML
20 INJECTION, SOLUTION INTRAVENOUS CONTINUOUS
Status: DISCONTINUED | OUTPATIENT
Start: 2022-08-03 | End: 2022-08-06 | Stop reason: HOSPADM

## 2022-08-03 RX ADMIN — BEBTELOVIMAB 175 MG: 87.5 INJECTION, SOLUTION INTRAVENOUS at 08:43

## 2022-08-03 NOTE — TELEPHONE ENCOUNTER
Pt called in regarding some discomfort that she is having under her breast on the left side where the incision is  Please advise   Thank you     [phone]- Leidy Stevenson

## 2022-08-03 NOTE — TELEPHONE ENCOUNTER
Spoke with patient, she feels "something" near her suture, but it is not painful or red  She just feels a slight discomfort  I advised her to have the VNA look at it tomorrow and call me  She could possible send me pictures, as well  I also told her that her pathology is not back yet

## 2022-08-04 ENCOUNTER — HOME CARE VISIT (OUTPATIENT)
Dept: HOME HEALTH SERVICES | Facility: HOME HEALTHCARE | Age: 77
End: 2022-08-04
Payer: COMMERCIAL

## 2022-08-04 VITALS
HEART RATE: 88 BPM | OXYGEN SATURATION: 98 % | RESPIRATION RATE: 18 BRPM | TEMPERATURE: 97.9 F | SYSTOLIC BLOOD PRESSURE: 104 MMHG | DIASTOLIC BLOOD PRESSURE: 72 MMHG

## 2022-08-04 PROCEDURE — G0299 HHS/HOSPICE OF RN EA 15 MIN: HCPCS

## 2022-08-04 PROCEDURE — 400013 VN SOC

## 2022-08-05 ENCOUNTER — HOME CARE VISIT (OUTPATIENT)
Dept: HOME HEALTH SERVICES | Facility: HOME HEALTHCARE | Age: 77
End: 2022-08-05
Payer: COMMERCIAL

## 2022-08-05 PROCEDURE — G0151 HHCP-SERV OF PT,EA 15 MIN: HCPCS

## 2022-08-06 VITALS — DIASTOLIC BLOOD PRESSURE: 72 MMHG | OXYGEN SATURATION: 96 % | HEART RATE: 93 BPM | SYSTOLIC BLOOD PRESSURE: 110 MMHG

## 2022-08-06 NOTE — CASE COMMUNICATION
pt seen today for home PT evaluation and plan to see 2 times a week for 2 weeks to address decreased endurance and mild strength deficits with gait and functional mobility    Thank You

## 2022-08-08 ENCOUNTER — HOSPITAL ENCOUNTER (OUTPATIENT)
Dept: RADIOLOGY | Facility: HOSPITAL | Age: 77
Discharge: HOME | End: 2022-08-08
Attending: INTERNAL MEDICINE
Payer: COMMERCIAL

## 2022-08-08 ENCOUNTER — HOME CARE VISIT (OUTPATIENT)
Dept: HOME HEALTH SERVICES | Facility: HOME HEALTHCARE | Age: 77
End: 2022-08-08
Payer: COMMERCIAL

## 2022-08-08 ENCOUNTER — APPOINTMENT (OUTPATIENT)
Dept: LAB | Facility: HOSPITAL | Age: 77
End: 2022-08-08
Attending: INTERNAL MEDICINE
Payer: COMMERCIAL

## 2022-08-08 VITALS — HEART RATE: 96 BPM | DIASTOLIC BLOOD PRESSURE: 70 MMHG | OXYGEN SATURATION: 96 % | SYSTOLIC BLOOD PRESSURE: 100 MMHG

## 2022-08-08 DIAGNOSIS — D49.1 PLEURAL NEOPLASM: ICD-10-CM

## 2022-08-08 DIAGNOSIS — B96.81 GASTRIC ULCER DUE TO HELICOBACTER PYLORI, UNSPECIFIED CHRONICITY: ICD-10-CM

## 2022-08-08 DIAGNOSIS — K25.9 GASTRIC ULCER DUE TO HELICOBACTER PYLORI, UNSPECIFIED CHRONICITY: ICD-10-CM

## 2022-08-08 DIAGNOSIS — Z01.818 OTHER SPECIFIED PRE-OPERATIVE EXAMINATION: ICD-10-CM

## 2022-08-08 LAB
ALBUMIN SERPL BCP-MCNC: 4.2 G/DL (ref 3.5–5)
ALP SERPL-CCNC: 68 U/L (ref 34–104)
ALT SERPL W P-5'-P-CCNC: 12 U/L (ref 7–52)
ANION GAP SERPL CALCULATED.3IONS-SCNC: 6 MMOL/L (ref 4–13)
AST SERPL W P-5'-P-CCNC: 16 U/L (ref 13–39)
BASOPHILS # BLD AUTO: 0.03 THOUSANDS/ΜL (ref 0–0.1)
BASOPHILS NFR BLD AUTO: 1 % (ref 0–1)
BILIRUB SERPL-MCNC: 0.55 MG/DL (ref 0.2–1)
BUN SERPL-MCNC: 16 MG/DL (ref 5–25)
CALCIUM SERPL-MCNC: 10 MG/DL (ref 8.4–10.2)
CARDIAC TROPONIN I PNL SERPL HS: 2 NG/L (ref 8–18)
CHLORIDE SERPL-SCNC: 104 MMOL/L (ref 96–108)
CO2 SERPL-SCNC: 29 MMOL/L (ref 21–32)
CREAT SERPL-MCNC: 0.89 MG/DL (ref 0.6–1.3)
EOSINOPHIL # BLD AUTO: 0.24 THOUSAND/ΜL (ref 0–0.61)
EOSINOPHIL NFR BLD AUTO: 5 % (ref 0–6)
ERYTHROCYTE [DISTWIDTH] IN BLOOD BY AUTOMATED COUNT: 13.4 % (ref 11.6–15.1)
GFR SERPL CREATININE-BSD FRML MDRD: 63 ML/MIN/1.73SQ M
GLUCOSE SERPL-MCNC: 81 MG/DL (ref 65–140)
HCT VFR BLD AUTO: 44.8 % (ref 34.8–46.1)
HGB BLD-MCNC: 14.2 G/DL (ref 11.5–15.4)
IMM GRANULOCYTES # BLD AUTO: 0.01 THOUSAND/UL (ref 0–0.2)
IMM GRANULOCYTES NFR BLD AUTO: 0 % (ref 0–2)
LYMPHOCYTES # BLD AUTO: 1.98 THOUSANDS/ΜL (ref 0.6–4.47)
LYMPHOCYTES NFR BLD AUTO: 39 % (ref 14–44)
MCH RBC QN AUTO: 29.5 PG (ref 26.8–34.3)
MCHC RBC AUTO-ENTMCNC: 31.7 G/DL (ref 31.4–37.4)
MCV RBC AUTO: 93 FL (ref 82–98)
MONOCYTES # BLD AUTO: 0.5 THOUSAND/ΜL (ref 0.17–1.22)
MONOCYTES NFR BLD AUTO: 10 % (ref 4–12)
NEUTROPHILS # BLD AUTO: 2.31 THOUSANDS/ΜL (ref 1.85–7.62)
NEUTS SEG NFR BLD AUTO: 45 % (ref 43–75)
NRBC BLD AUTO-RTO: 0 /100 WBCS
PLATELET # BLD AUTO: 335 THOUSANDS/UL (ref 149–390)
PMV BLD AUTO: 10.1 FL (ref 8.9–12.7)
POTASSIUM SERPL-SCNC: 4.8 MMOL/L (ref 3.5–5.3)
PROT SERPL-MCNC: 8 G/DL (ref 6.4–8.4)
RBC # BLD AUTO: 4.81 MILLION/UL (ref 3.81–5.12)
SODIUM SERPL-SCNC: 139 MMOL/L (ref 135–147)
WBC # BLD AUTO: 5.07 THOUSAND/UL (ref 4.31–10.16)

## 2022-08-08 PROCEDURE — 71046 X-RAY EXAM CHEST 2 VIEWS: CPT

## 2022-08-08 PROCEDURE — 85025 COMPLETE CBC W/AUTO DIFF WBC: CPT

## 2022-08-08 PROCEDURE — 80053 COMPREHEN METABOLIC PANEL: CPT

## 2022-08-08 PROCEDURE — 36415 COLL VENOUS BLD VENIPUNCTURE: CPT

## 2022-08-08 PROCEDURE — 84484 ASSAY OF TROPONIN QUANT: CPT

## 2022-08-08 PROCEDURE — G0152 HHCP-SERV OF OT,EA 15 MIN: HCPCS

## 2022-08-09 ENCOUNTER — APPOINTMENT (OUTPATIENT)
Dept: LAB | Facility: HOSPITAL | Age: 77
End: 2022-08-09
Attending: INTERNAL MEDICINE
Payer: COMMERCIAL

## 2022-08-09 ENCOUNTER — HOME CARE VISIT (OUTPATIENT)
Dept: HOME HEALTH SERVICES | Facility: HOME HEALTHCARE | Age: 77
End: 2022-08-09
Payer: COMMERCIAL

## 2022-08-09 VITALS
SYSTOLIC BLOOD PRESSURE: 108 MMHG | OXYGEN SATURATION: 96 % | DIASTOLIC BLOOD PRESSURE: 68 MMHG | HEART RATE: 86 BPM | TEMPERATURE: 97.3 F | RESPIRATION RATE: 18 BRPM

## 2022-08-09 VITALS — DIASTOLIC BLOOD PRESSURE: 64 MMHG | HEART RATE: 94 BPM | SYSTOLIC BLOOD PRESSURE: 106 MMHG | OXYGEN SATURATION: 97 %

## 2022-08-09 PROCEDURE — 87338 HPYLORI STOOL AG IA: CPT

## 2022-08-09 PROCEDURE — G0151 HHCP-SERV OF PT,EA 15 MIN: HCPCS

## 2022-08-09 PROCEDURE — G0299 HHS/HOSPICE OF RN EA 15 MIN: HCPCS

## 2022-08-09 NOTE — CASE COMMUNICATION
Homecare occupational therapy evaluation completed 8/8/2022  Plan of care 1x a week for 3 weeks for training patient on DME to increase safety during shower performance  training on compensatory and safety technqiues during IADL

## 2022-08-10 ENCOUNTER — HOME CARE VISIT (OUTPATIENT)
Dept: HOME HEALTH SERVICES | Facility: HOME HEALTHCARE | Age: 77
End: 2022-08-10
Payer: COMMERCIAL

## 2022-08-10 ENCOUNTER — OFFICE VISIT (OUTPATIENT)
Dept: CARDIAC SURGERY | Facility: CLINIC | Age: 77
End: 2022-08-10

## 2022-08-10 ENCOUNTER — DOCUMENTATION (OUTPATIENT)
Dept: SURGICAL ONCOLOGY | Facility: CLINIC | Age: 77
End: 2022-08-10

## 2022-08-10 VITALS
RESPIRATION RATE: 17 BRPM | HEART RATE: 88 BPM | TEMPERATURE: 97.7 F | SYSTOLIC BLOOD PRESSURE: 116 MMHG | DIASTOLIC BLOOD PRESSURE: 78 MMHG | BODY MASS INDEX: 26.16 KG/M2 | HEIGHT: 64 IN | OXYGEN SATURATION: 96 % | WEIGHT: 153.22 LBS

## 2022-08-10 DIAGNOSIS — C34.92 ADENOCARCINOMA OF LEFT LUNG (HCC): Primary | ICD-10-CM

## 2022-08-10 PROCEDURE — 99024 POSTOP FOLLOW-UP VISIT: CPT | Performed by: THORACIC SURGERY (CARDIOTHORACIC VASCULAR SURGERY)

## 2022-08-10 NOTE — PROGRESS NOTES
Thoracic Follow-Up  Assessment/Plan:    Adenocarcinoma of lung [facility])  Ms Amanda Medina is progressing from a thoracic surgery standpoint  Her chest xray does not reveal any effusion or pneumothorax  Her pathology was consistent with stage IA adenocarcinoma  Therefore, she does not need any further treatment  We will have her return to our office for her 2nd post op visit in 1 month  She is in agreement with the plan  Diagnoses and all orders for this visit:    Adenocarcinoma of left lung [facility])          Thoracic History       Diagnosis: Left lower lobe lung adenocarcinoma  Procedure: flexible bronchoscopy, robotic assisted left lower lobe therapeutic wedge resection, MLND 7/21/22  Pathology: left lower lobe tumor measuring 1 8 x 1 x 0 6 cm revealing invasive mucinous adenocarcinoma, well differentiated  All margins negative, LV or pleural invasion  6 LN negative for carcinoma, including levels 5,6,8L,9L  AJCC 8th Edition stage IA2 (pT1b, pN0,M0, G1)     Cancer Staging  Adenocarcinoma of lung (HCC)  Staging form: Lung, AJCC 8th Edition  - Clinical stage from 7/21/2022: Stage IA2 (cT1b, cN0, cM0) - Signed by Marisol Haque PA-C on 8/10/2022  Histopathologic type: Adenocarcinoma, NOS  Histologic grade (G): G1  Histologic grading system: 4 grade system  Laterality: Left  Tumor size (mm): 18  Lymph-vascular invasion (LVI): LVI not present (absent)/not identified  Specimen type: Excision     Patient ID: Maricruz Le is a 77 y o  female  ECOG 0    HPI    Ms Amanda Medina is a 78 yo female who was referred by Dr Olivia Bennett for a left lower lobe lung nodule seen on a CT scan, in which IR biopsy was consistent with invasive mucinous adenocarcinoma  Her PFT's were minimally - moderately decreased and therefore was electively admitted on 7/21/22 to undergo a robotic assisted left thoracoscopic therapeutic wedge resection  Her hospital course was uncomplicated and was discharged on 7/24/22 on the ERAS diet       She returns today with a CXR from 8/8/22, which did not reveal any pneumothorax with blunting of left costophrenic angle and slight elevated of left hemidiaphragm  On discussion, she is doing "so so"  She is getting an intermittent "pin" sensation, discomfort  She is not taking any medication  She was diagnosed with COVID on 8/1/22  She was seen by her PCP for this pain and her blood work and EKG were normal  She does not want to take any pain medication  She denies fever, chills, cough, or shortness of breath  The following portions of the patient's history were reviewed and updated as appropriate: allergies, current medications, past family history, past medical history, past social history, past surgical history and problem list     Review of Systems      Objective:   Physical Exam  Vitals reviewed  Constitutional:       General: She is not in acute distress  Appearance: Normal appearance  She is well-developed  She is not diaphoretic  HENT:      Head: Normocephalic and atraumatic  Mouth/Throat:      Comments: Masked     Eyes:      General: No scleral icterus  Extraocular Movements: Extraocular movements intact  Neck:      Trachea: No tracheal deviation  Cardiovascular:      Rate and Rhythm: Normal rate and regular rhythm  Pulses: Normal pulses  Heart sounds: Normal heart sounds  No murmur heard  Pulmonary:      Effort: Pulmonary effort is normal  No respiratory distress  Breath sounds: Normal breath sounds  No wheezing  Abdominal:      Palpations: Abdomen is soft  Musculoskeletal:         General: Normal range of motion  Cervical back: Normal range of motion and neck supple  Right lower leg: No edema  Left lower leg: No edema  Skin:     General: Skin is warm and dry  Findings: No erythema  Comments: Left robotic incisions healing well  1 prolene suture was removed  Glue was removed      Neurological:      Mental Status: She is alert and oriented to person, place, and time  Cranial Nerves: No cranial nerve deficit  Psychiatric:         Mood and Affect: Mood normal          Behavior: Behavior normal          Thought Content: Thought content normal      /78   Pulse 88   Temp 97 7 °F (36 5 °C)   Resp 17   Ht 5' 4" (1 626 m)   Wt 69 5 kg (153 lb 3 5 oz)   SpO2 96%   BMI 26 30 kg/m²     XR chest pa & lateral    Result Date: 8/8/2022  Impression Postoperative changes   Workstation performed: [id number]

## 2022-08-10 NOTE — ASSESSMENT & PLAN NOTE
Ms Jennifer Lorenz is progressing from a thoracic surgery standpoint  Her chest xray does not reveal any effusion or pneumothorax  Her pathology was consistent with stage IA adenocarcinoma  Therefore, she does not need any further treatment  We will have her return to our office for her 2nd post op visit in 1 month  She is in agreement with the plan

## 2022-08-10 NOTE — PROGRESS NOTES
I met with Mara Abdullahi at her first post-op visit  She's familiar with me from prior visits  Questions answered, support provided

## 2022-08-11 ENCOUNTER — HOME CARE VISIT (OUTPATIENT)
Dept: HOME HEALTH SERVICES | Facility: HOME HEALTHCARE | Age: 77
End: 2022-08-11
Payer: COMMERCIAL

## 2022-08-11 LAB — H PYLORI AG STL QL IA: NEGATIVE

## 2022-08-11 PROCEDURE — G0299 HHS/HOSPICE OF RN EA 15 MIN: HCPCS

## 2022-08-15 PROCEDURE — G0180 MD CERTIFICATION HHA PATIENT: HCPCS | Performed by: THORACIC SURGERY (CARDIOTHORACIC VASCULAR SURGERY)

## 2022-08-16 ENCOUNTER — HOME CARE VISIT (OUTPATIENT)
Dept: HOME HEALTH SERVICES | Facility: HOME HEALTHCARE | Age: 77
End: 2022-08-16
Payer: COMMERCIAL

## 2022-08-17 ENCOUNTER — OFFICE VISIT (OUTPATIENT)
Dept: GASTROENTEROLOGY | Facility: CLINIC | Age: 77
End: 2022-08-17
Payer: COMMERCIAL

## 2022-08-17 VITALS
SYSTOLIC BLOOD PRESSURE: 125 MMHG | HEART RATE: 93 BPM | HEIGHT: 64 IN | WEIGHT: 152 LBS | BODY MASS INDEX: 25.95 KG/M2 | DIASTOLIC BLOOD PRESSURE: 73 MMHG

## 2022-08-17 DIAGNOSIS — K21.9 GASTROESOPHAGEAL REFLUX DISEASE WITHOUT ESOPHAGITIS: ICD-10-CM

## 2022-08-17 DIAGNOSIS — R10.13 EPIGASTRIC PAIN: ICD-10-CM

## 2022-08-17 DIAGNOSIS — Z86.010 HX OF COLONIC POLYPS: Primary | ICD-10-CM

## 2022-08-17 PROCEDURE — 99214 OFFICE O/P EST MOD 30 MIN: CPT | Performed by: PHYSICIAN ASSISTANT

## 2022-08-17 NOTE — PATIENT INSTRUCTIONS
Scheduled date of EGD/colonoscopy (as of today): Aug 26 2022  Physician performing EGD/colonoscopy: Arias Kellogg  Location of EGD/colonoscopy: [address] [place] [address]  Desired bowel prep reviewed with patient: Golytely  Instructions reviewed with patient by: Kirsten Perez   Clearances: None

## 2022-08-17 NOTE — PROGRESS NOTES
[facility] - Outpatient Consultation  Delmy Yepez 77 y o  female MRN: [id number]  Encounter: [id number]          ASSESSMENT AND PLAN:      1  Gastroesophageal reflux disease without esophagitis  Patient with history of GERD on prior EGD, no longer taking any medication    2  Epigastric pain  Patient with epigastric pain and this could be related to possible hernia, there is an area where I palpated in the epigastrium which was more significantly tender with possible defect  Will order CT scan for evaluation  Patient is refusing IV contrast due to allergy and she does not want to do allergy prep  EGD will be done due to thickening noted in her stomach on PET-CT   - CT abdomen pelvis wo contrast; Future  - EGD; Future    3  Hx of colonic polyps  Patient with history of colon polyps and has not had a colonoscopy in 7-10 years because she does not like the prep  Encouraged patient that she should have colonoscopy at time of EGD for screening purposes  - Colonoscopy; Future  - polyethylene glycol (GOLYTELY) 4000 mL solution; Take 4,000 mL by mouth once for 1 dose  Dispense: 4000 mL; Refill: 0    ______________________________________________________________________    HPI:      This is a 78-year-old female who presents with complaints of epigastric discomfort which she describes as a pin poking her  Patient recently was diagnosed with lung cancer and underwent resection and reports that since there was no spread that they are going to just do follow-up PET scan  Patient had PET-CT in May of this year which had shown nonspecific mural thickening of the proximal to mid gastric wall possibly related to under distension with underlying gastric mucosal process not excluded  Patient did have EGD in January of last year and at that point was noted to have mild erythema in the distal esophagus but no mass or esophageal stricture was seen    Patient does have history of H pylori but recent stool test was negative  Patient otherwise was given gabapentin for nerve pain which she states was too sedating and was unable to take this  REVIEW OF SYSTEMS:    CONSTITUTIONAL: Denies any fever, chills, rigors, and weight loss  HEENT: No earache or tinnitus  Denies hearing loss or visual disturbances  CARDIOVASCULAR: No chest pain or palpitations  RESPIRATORY: Denies any cough, hemoptysis, shortness of breath or dyspnea on exertion  GASTROINTESTINAL: As noted in the History of Present Illness  GENITOURINARY: No problems with urination  Denies any hematuria or dysuria  NEUROLOGIC: No dizziness or vertigo, denies headaches  MUSCULOSKELETAL: Denies any muscle or joint pain  SKIN: Denies skin rashes or itching  ENDOCRINE: Denies excessive thirst  Denies intolerance to heat or cold  PSYCHOSOCIAL: Denies depression or anxiety  Denies any recent memory loss         Historical Information   Past Medical History:   Diagnosis Date    [facility]2022    Arthritis     Borderline diabetes     diet controlled    Cancer ([facility] )     Colon polyp     CPAP (continuous positive airway pressure) dependence     refuses to use    Depression     Disease of thyroid gland     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    DVT (deep venous thrombosis) (HCC)     hx- left leg    GERD (gastroesophageal reflux disease)     PONV (postoperative nausea and vomiting)     Pulmonary emboli (HCC)     during pregnancy    Rash     arms on occasion, cause unknown    Sleep apnea      Past Surgical History:   Procedure Laterality Date    COLONOSCOPY      DILATION AND CURETTAGE, DIAGNOSTIC / THERAPEUTIC      IR BIOPSY LUNG  6/15/2022    CO BRONCHOSCOPY,DIAGNOSTIC N/A 7/21/2022    Procedure: BRONCHOSCOPY FLEXIBLE;  Surgeon: Braulio Stroud MD;  [facility]: [facility];  Service: Thoracic    CO THORACOSCOPY W/THERA WEDGE RESEXN INITIAL UNILAT Left 7/21/2022    Procedure: RESECTION,LUNG WEDGE THORACOSCOPIC W/ ROBOTICS, w/ mediastinal lymph node dissection;  Surgeon: Poornima Paige MD;  [facility];  Service: Thoracic    THYROIDECTOMY, PARTIAL      2020     Social History   Social History     Substance and Sexual Activity   Alcohol Use Yes    Comment: social rare     Social History     Substance and Sexual Activity   Drug Use Never     Social History     Tobacco Use   Smoking Status Never Smoker   Smokeless Tobacco Never Used     Family History   Problem Relation Age of Onset    No Known Problems Mother     Cancer Sister [de-identified]    No Known Problems Sister     Asthma Daughter     No Known Problems Son     No Known Problems Maternal Aunt     No Known Problems Maternal Aunt     No Known Problems Paternal Aunt     No Known Problems Paternal Aunt     Breast cancer Other 59       Meds/Allergies       Current Outpatient Medications:     aspirin 81 mg chewable tablet    cholecalciferol (VITAMIN D3) 400 units tablet    cyanocobalamin (VITAMIN B-12) 100 mcg tablet    Diclofenac Sodium (VOLTAREN) 1 %    levothyroxine 75 mcg tablet    loratadine (CLARITIN) 10 mg tablet    magnesium gluconate (MAGONATE) 500 MG tablet    Omega-3 Fatty Acids (fish oil) 1,000 mg    polyethylene glycol (GOLYTELY) 4000 mL solution    RA Allergy Relief 25 MG capsule    vitamin E 100 UNIT capsule    diphenhydrAMINE (BENADRYL) 50 MG tablet    gabapentin (Neurontin) 300 mg capsule    Allergies   Allergen Reactions    Crab (Diagnostic) - Food Allergy Itching and Rash    Iv Contrast [Iodinated Diagnostic Agents] Rash    Penicillins Rash           Objective     Blood pressure 125/73, pulse 93, height 5' 4" (1 626 m), weight 68 9 kg (152 lb), not currently breastfeeding  Body mass index is 26 09 kg/m²          PHYSICAL EXAM:      General Appearance:   Alert, cooperative, no distress   HEENT:   Normocephalic, atraumatic, anicteric      Neck:  Supple, symmetrical, trachea midline   Lungs:   Clear to auscultation bilaterally; no rales, rhonchi or wheezing; respirations unlabored    Heart[de-identified]   Regular rate and rhythm; no murmur, rub, or gallop  Abdomen:   Soft, +tender to palp of epigastric with possible defect (?hernia), non-distended; normal bowel sounds; no masses, no organomegaly    Genitalia:   Deferred    Rectal:   Deferred    Extremities:  No cyanosis, clubbing or edema    Pulses:  2+ and symmetric    Skin:  No jaundice, rashes, or lesions    Lymph nodes:  No palpable cervical lymphadenopathy        Lab Results:   No visits with results within 1 Day(s) from this visit     Latest known visit with results is:   Appointment on 08/08/2022   Component Date Value    WBC 08/08/2022 5 07     RBC 08/08/2022 4 81     Hemoglobin 08/08/2022 14 2     Hematocrit 08/08/2022 44 8     MCV 08/08/2022 93     MCH 08/08/2022 29 5     MCHC 08/08/2022 31 7     RDW 08/08/2022 13 4     MPV 08/08/2022 10 1     Platelets 03/88/0616 335     nRBC 08/08/2022 0     Neutrophils Relative 08/08/2022 45     Immat GRANS % 08/08/2022 0     Lymphocytes Relative 08/08/2022 39     Monocytes Relative 08/08/2022 10     Eosinophils Relative 08/08/2022 5     Basophils Relative 08/08/2022 1     Neutrophils Absolute 08/08/2022 2 31     Immature Grans Absolute 08/08/2022 0 01     Lymphocytes Absolute 08/08/2022 1 98     Monocytes Absolute 08/08/2022 0 50     Eosinophils Absolute 08/08/2022 0 24     Basophils Absolute 08/08/2022 0 03     Sodium 08/08/2022 139     Potassium 08/08/2022 4 8     Chloride 08/08/2022 104     CO2 08/08/2022 29     ANION GAP 08/08/2022 6     BUN 08/08/2022 16     Creatinine 08/08/2022 0 89     Glucose 08/08/2022 81     Calcium 08/08/2022 10 0     AST 08/08/2022 16     ALT 08/08/2022 12     Alkaline Phosphatase 08/08/2022 68     Total Protein 08/08/2022 8 0     Albumin 08/08/2022 4 2     Total Bilirubin 08/08/2022 0 55     eGFR 08/08/2022 63     HS TnI random 08/08/2022 2 (A)    H pylori Ag, Stl 08/09/2022 Negative Radiology Results:   XR chest portable    Result Date: 7/21/2022  Narrative: CHEST INDICATION:   s/p LLL wedge resection  COMPARISON:  Compared with 6/15/2022 EXAM PERFORMED/VIEWS:  XR CHEST PORTABLE FINDINGS: Left chest tube tip near the apex  Cardiomediastinal silhouette appears unremarkable  Suture in the left lung base  The lungs are clear  No pneumothorax or pleural effusion  Osseous structures appear within normal limits for patient age  Impression: Left chest tube in place with no pneumothorax  No acute cardiopulmonary disease  Workstation performed: [id number]     XR chest pa & lateral    Result Date: 8/8/2022  Narrative: CHEST INDICATION:   D49 1: Neoplasm of unspecified behavior of respiratory system  History of left lung surgery 3 weeks ago for adenocarcinoma COMPARISON:  7/28/2022; CT from 522; 1/21/2021 EXAM PERFORMED/VIEWS:  XR CHEST PA & LATERAL FINDINGS: Cardiomediastinal silhouette appears unremarkable  Linear atelectasis at the bases has improved  No pneumothorax  Blunting the left costophrenic angle is seen as well as slight elevation of the left hemidiaphragm  Some sutures are seen in the left hilar region  Osseous structures appear within normal limits for patient age  Impression: Postoperative changes  Workstation performed: [id number]     XR chest pa & lateral    Result Date: 7/28/2022  Narrative: CHEST INDICATION:   C34 92: Malignant neoplasm of unspecified part of left bronchus or lung  Shortness of breath  Left lower lobe wedge resection on 7/21/2022  COMPARISON:  Chest radiograph from 7/22/2022 and chest CT from 5/8/2022  EXAM PERFORMED/VIEWS:  XR CHEST PA & LATERAL  DUAL ENERGY SUBTRACTION  FINDINGS: Cardiomediastinal silhouette appears unremarkable  Minimal benign linear atelectasis in both bases  Minimal blunting of the left costophrenic sulcus  No pneumothorax  Osseous structures appear within normal limits for patient age       Impression: Minimal benign bibasilar linear atelectasis  Minimal blunting of the left costophrenic sulcus which could be a trace effusion or benign postoperative pleural thickening  Workstation performed: [id number]     XR chest pa & lateral    Result Date: 7/23/2022  Narrative: CHEST INDICATION:  Status post chest tube removal  Left lower lobe wedge resection for biopsy proven lung cancer on 7/21/2022  COMPARISON:  7/21/2022, CT chest 5/8/2022 EXAM PERFORMED/VIEWS:  XR CHEST PA & LATERAL  The frontal view was performed utilizing dual energy radiographic technique  Images: 4 FINDINGS: Cardiomediastinal silhouette appears unremarkable  Tiny left apical pneumothorax status post chest tube removal   Small amount of subcutaneous emphysema noted in the inferolateral left chest wall  No acute infiltrates or pleural effusions  Osseous structures appear within normal limits for patient age  Impression: Tiny left apical pneumothorax status post chest tube removal  The study was marked in EPIC for immediate notification   Workstation performed: [id number]

## 2022-08-19 ENCOUNTER — HOSPITAL ENCOUNTER (OUTPATIENT)
Dept: NUCLEAR MEDICINE | Facility: HOSPITAL | Age: 77
Discharge: HOME | End: 2022-08-19
Payer: COMMERCIAL

## 2022-08-19 ENCOUNTER — HOSPITAL ENCOUNTER (OUTPATIENT)
Dept: RADIOLOGY | Facility: HOSPITAL | Age: 77
Discharge: HOME | End: 2022-08-19

## 2022-08-19 DIAGNOSIS — Z82.49 FHX: PULMONARY EMBOLISM: ICD-10-CM

## 2022-08-19 DIAGNOSIS — R07.9 CHEST PAIN, UNSPECIFIED TYPE: ICD-10-CM

## 2022-08-19 PROCEDURE — A9540 TC99M MAA: HCPCS

## 2022-08-19 PROCEDURE — 71046 X-RAY EXAM CHEST 2 VIEWS: CPT

## 2022-08-19 PROCEDURE — A9558 XE133 XENON 10MCI: HCPCS

## 2022-08-19 PROCEDURE — 78582 LUNG VENTILAT&PERFUS IMAGING: CPT

## 2022-08-22 ENCOUNTER — OFFICE VISIT (OUTPATIENT)
Dept: PULMONOLOGY | Facility: CLINIC | Age: 77
End: 2022-08-22
Payer: COMMERCIAL

## 2022-08-22 VITALS
DIASTOLIC BLOOD PRESSURE: 82 MMHG | RESPIRATION RATE: 18 BRPM | TEMPERATURE: 97.8 F | HEART RATE: 96 BPM | WEIGHT: 153 LBS | BODY MASS INDEX: 26.12 KG/M2 | SYSTOLIC BLOOD PRESSURE: 124 MMHG | OXYGEN SATURATION: 99 % | HEIGHT: 64 IN

## 2022-08-22 DIAGNOSIS — J44.9 OBSTRUCTIVE LUNG DISEASE (HCC): ICD-10-CM

## 2022-08-22 DIAGNOSIS — G47.33 OSA (OBSTRUCTIVE SLEEP APNEA): ICD-10-CM

## 2022-08-22 DIAGNOSIS — C34.92 ADENOCARCINOMA OF LEFT LUNG (HCC): Primary | ICD-10-CM

## 2022-08-22 PROCEDURE — 99214 OFFICE O/P EST MOD 30 MIN: CPT | Performed by: INTERNAL MEDICINE

## 2022-08-22 NOTE — PROGRESS NOTES
Progress Note - Pulmonary   Nancy Grave 77 y o  female MRN: [id number]   Encounter: [id number]      Assessment/Plan:  Patient is a 43-year-old female with past medical history significant for adenocarcinoma status post resection, DORIS not on CPAP who presents for routine follow-up  Patient's primary complaint is a sensation of foreign body within her left chest   The discomfort is not reproducible  Patient is undergoing a CT abdomen pelvis for other indications therefore lateral chest CT to evaluate left chest wall  If there is no significant findings, may trial ibuprofen 600 mg  3 times daily x5 days in setting of information  Left chest wall pain  -  F/u CT chest  -  Trial of ibuprofen if no significant findings on chest CT   -  600mg 3x daily x 5 days     Adenocarcinoma s/p resection  -  Resection in 7/2022     Obstructive sleep apnea/nocturnal hypoxia  -  Not using cpap     Dyspnea on exertion  -  Strong family history of asthma  -  Not using inhalers  -  Breathing is stable     Remote history of pulmonary embolism  -  Currently requiring only aspirin 81 mg  -  Occurred about 1990 or so  -  Last took anticoagulants in about 2011     Hypothyroidism  -  Normal TSH     1  Adenocarcinoma of left lung ([facility] )  -     CT chest without contrast; Future; Expected date: 08/22/2022        Patient may follow up in 6 months or sooner as necessary  Orders:  Orders Placed This Encounter   Procedures    CT chest without contrast     Standing Status:   Future     Standing Expiration Date:   8/22/2026     Scheduling Instructions: There is no prep for this study  Please bring your insurance cards, a form of photo ID and a list of your medications with you  Arrive 15 minutes prior to your appointment time to register  On the day of your test, please bring any prior CT or MRI studies of this area with you that were not performed at a [facility]              To schedule this appointment, please contact [place] Scheduling at [phone]  Order Specific Question:   What is the patient's sedation requirement? Answer:   No Sedation     Order Specific Question:   Release to patient through Mychart     Answer:   Immediate     Order Specific Question:   Reason for Exam (FREE TEXT)     Answer:   post operative chest pain       Subjective: The patient notes discomfort in her left anterior chest   She has a foreign body type sensation in her left anterior chest        She notes abdominal discomfort as well for which he is undergoing evaluation hernia  She denies cough, congestion, fevers, chills, nausea or vomiting  Inhaler Regimen:  None    Remainder of review of systems negative except as described in HPI  The following portions of the patient's history were reviewed and updated as appropriate: allergies, current medications, past family history, past medical history, past social history, past surgical history and problem list      Objective:   Vitals: Blood pressure 124/82, pulse 96, temperature 97 8 °F (36 6 °C), temperature source Tympanic, resp  rate 18, height 5' 4" (1 626 m), weight 69 4 kg (153 lb), SpO2 99 %, not currently breastfeeding , RA, Body mass index is 26 26 kg/m²  Physical Exam  Gen: Pleasant, awake, alert, oriented x 3, no acute distress  HEENT: Mucous membranes moist, no oral lesions, no thrush  NECK: No accessory muscle use, JVP not elevated  Cardiac: RRR, single S1, single S2, no murmurs, no rubs, no gallops  Lungs: CTA b/l  Abdomen: normoactive bowel sounds, soft nontender, nondistended, no rebound or rigidity, no guarding  Extremities: no cyanosis, no clubbing, no LE edema  MSK:  Strength equal in all extremities  Derm:  No rashes/lesions noted  Neuro:  Appropriate mood/affect    Labs: I have personally reviewed pertinent lab results    Lab Results   Component Value Date    WBC 5 07 08/08/2022    HGB 14 2 08/08/2022     08/08/2022     Lab Results   Component Value Date    CREATININE 0 89 08/08/2022     Imaging and other studies: I have personally reviewed pertinent reports  and I have personally reviewed pertinent films in PACS  VQ scan; CXR 8/19/2022  My interpretation:  No acute intrathoracic process    Radiology findings:  V/Q:  Ventilation imaging demonstrates mildly diffusely diminished left lung ventilation  Small focus of air trapping noted in the left perihilar lung     Perfusion imaging demonstrates a mildly heterogeneous distribution of activity without evidence of a segmental or discrete subsegmental perfusion defect  No ventilation perfusion mismatch  CXR:  Cardiomediastinal silhouette appears unremarkable  Surgical changes in the left lower lobe with no acute disease  No effusion or pneumothorax  Osseous structures appear within normal limits for patient age  Pulmonary Function Testing: I have personally reviewed pertinent reports  and I have personally reviewed pertinent films in PACS  6/30/22:  FEV1/FVC Ratio:  1 53 % (56 % predicted)  Forced Vital Capacity:  0 96 L   46 % predicted  FEV1:  0 96 L    46 % predicted  After administration of bronchodilator FEV1: 0 93 (-2%)     Lung volumes by body plethysmography: Total Lung Capacity 72 % predicted Residual volume 96 % predicted  RV/TLC ratio 132 %     DLCO corrected for patients hemoglobin level:  50 %    Vandana Mcwilliams No

## 2022-08-23 ENCOUNTER — HOSPITAL ENCOUNTER (OUTPATIENT)
Dept: CT IMAGING | Facility: HOSPITAL | Age: 77
Discharge: HOME | End: 2022-08-23
Payer: COMMERCIAL

## 2022-08-23 DIAGNOSIS — R10.13 EPIGASTRIC PAIN: ICD-10-CM

## 2022-08-23 DIAGNOSIS — C34.92 ADENOCARCINOMA OF LEFT LUNG (HCC): ICD-10-CM

## 2022-08-23 PROCEDURE — 74176 CT ABD & PELVIS W/O CONTRAST: CPT

## 2022-08-23 PROCEDURE — 71250 CT THORAX DX C-: CPT

## 2022-08-23 PROCEDURE — G1004 CDSM NDSC: HCPCS

## 2022-08-24 ENCOUNTER — TELEPHONE (OUTPATIENT)
Dept: PULMONOLOGY | Facility: CLINIC | Age: 77
End: 2022-08-24

## 2022-08-24 NOTE — TELEPHONE ENCOUNTER
Pt is calling in regards to her CT scan results she completed titus 8/22  The results are not in yet but she is stating she will not taking Advil as you had requested until she gets the results back  She is asking for a call back as soon as possible with results   Please advise

## 2022-08-24 NOTE — TELEPHONE ENCOUNTER
Let patient know it looks like to CT was actually completed yesterday we will wait for the radiologist to read it later today or tomorrow and will call her with the final results

## 2022-08-25 ENCOUNTER — TELEPHONE (OUTPATIENT)
Dept: OTHER | Facility: OTHER | Age: 77
End: 2022-08-25

## 2022-08-25 NOTE — TELEPHONE ENCOUNTER
LMOM CT scan has not yet been read by radiology, we will give her a call back when radiology read becomes available  She can check back on Monday if she doesn't hear anything  Will give radiology a call to see if they can expedite the reading

## 2022-08-26 NOTE — TELEPHONE ENCOUNTER
Pt called to find out about her CT results  Advised pt results finally finalized  Please call pt back to discuss    Please advise: [phone]

## 2022-08-26 NOTE — TELEPHONE ENCOUNTER
Patient called back again regarding radiology results not yet received  Please call patient with results when they come in

## 2022-08-29 ENCOUNTER — TELEPHONE (OUTPATIENT)
Dept: GASTROENTEROLOGY | Facility: CLINIC | Age: 77
End: 2022-08-29

## 2022-08-29 ENCOUNTER — TELEPHONE (OUTPATIENT)
Dept: OTHER | Facility: OTHER | Age: 77
End: 2022-08-29

## 2022-08-29 NOTE — TELEPHONE ENCOUNTER
Pt  Called again for her results of her CT she had done last week  Shirlene JACOBO  Was the one who ordered it  Can you please have someone call her with the results

## 2022-08-29 NOTE — TELEPHONE ENCOUNTER
I will defer to Dr Claire Pagan I did not examine patient, suspect muscle pain but I am unable to determine without exam

## 2022-08-29 NOTE — TELEPHONE ENCOUNTER
Patient is calling, she got a call that her CT came back normal  She wants to know why she is still having discomfort   Please advise

## 2022-08-29 NOTE — TELEPHONE ENCOUNTER
Patients GI provider:  Magdiel Funez    Number to return call: [phone]    Reason for call: Pt calling for CT results      Scheduled procedure/appointment date if applicable: n/a

## 2022-09-02 NOTE — TELEPHONE ENCOUNTER
Please let the patient know that I am reviewing the scan with the thoracic radiologist and will return her call once I have more information

## 2022-09-07 NOTE — TELEPHONE ENCOUNTER
Please let the patient know that the imaging was reviewed by thoracic Radiology  It is likely that which he is feeling is related to the previous biopsy  There was nothing within the subcutaneous region that she described  This will likely improve over time

## 2022-09-14 ENCOUNTER — OFFICE VISIT (OUTPATIENT)
Dept: CARDIAC SURGERY | Facility: CLINIC | Age: 77
End: 2022-09-14

## 2022-09-14 VITALS
HEART RATE: 78 BPM | RESPIRATION RATE: 17 BRPM | DIASTOLIC BLOOD PRESSURE: 78 MMHG | BODY MASS INDEX: 26.42 KG/M2 | SYSTOLIC BLOOD PRESSURE: 122 MMHG | OXYGEN SATURATION: 96 % | TEMPERATURE: 98.5 F | HEIGHT: 64 IN | WEIGHT: 154.76 LBS

## 2022-09-14 DIAGNOSIS — C34.92 ADENOCARCINOMA OF LEFT LUNG (HCC): Primary | ICD-10-CM

## 2022-09-14 PROCEDURE — 99024 POSTOP FOLLOW-UP VISIT: CPT | Performed by: THORACIC SURGERY (CARDIOTHORACIC VASCULAR SURGERY)

## 2022-09-14 NOTE — ASSESSMENT & PLAN NOTE
Roque Farrar is progressing well from a thoracic surgery standpoint  Her incisions are well healed  She is not taking any pain medication  She is still having a "pin" sensation in her epigastric region, which we don't think is related to her robotic thoracoscopic surgery  She can follow up with her primary care physician, although her CT scan of the CAP did not reveal any abnormalities  We will start surveillance and have her return 6 months from her surgery  She is in agreement with the plan

## 2022-09-14 NOTE — PROGRESS NOTES
Assessment/Plan:    Adenocarcinoma of lung ([facility] )  Candace River is progressing well from a thoracic surgery standpoint  Her incisions are well healed  She is not taking any pain medication  She is still having a "pin" sensation in her epigastric region, which we don't think is related to her robotic thoracoscopic surgery  She can follow up with her primary care physician, although her CT scan of the CAP did not reveal any abnormalities  We will start surveillance and have her return 6 months from her surgery  She is in agreement with the plan  Diagnoses and all orders for this visit:    Adenocarcinoma of left lung ([facility] )  -     CT chest wo contrast; Future          Thoracic History   Cancer Staging  Adenocarcinoma of lung [facility])  Staging form: Lung, AJCC 8th Edition  - Clinical stage from 7/21/2022: Stage IA2 (cT1b, cN0, cM0) - Signed by Carlitos Zarco PA-C on 8/10/2022  Histopathologic type: Adenocarcinoma, NOS  Histologic grade (G): G1  Histologic grading system: 4 grade system  Laterality: Left  Tumor size (mm): 18  Lymph-vascular invasion (LVI): LVI not present (absent)/not identified  Specimen type: Excision    Oncology History   Adenocarcinoma of lung ([facility] )   7/13/2022 Initial Diagnosis    Adenocarcinoma of lung ([place] [address] )     7/21/2022 -  Cancer Staged    Staging form: Lung, AJCC 8th Edition  - Clinical stage from 7/21/2022: Stage IA2 (cT1b, cN0, cM0) - Signed by Carlitos Zarco PA-C on 8/10/2022  Histopathologic type: Adenocarcinoma, NOS  Histologic grade (G): G1  Histologic grading system: 4 grade system  Laterality: Left  Tumor size (mm): 18  Lymph-vascular invasion (LVI): LVI not present (absent)/not identified  Specimen type: Excision            Patient ID: Wally Ho is a 77 y o  female  ECOG 0    HPI    Ms Alfa Quezada is a 78 yo female who underwent a robotic assisted left lower lobe thoracoscopic therapeutic wedge resection on 7/21/22   She was last seen on 8/10/22 at which point she was not taking any pain medication, but was having an intermittent "pin" sensation/ discomfort  She had COVID 8/1/22  Her last CXR from 8/8/22 did not reveal any pneumothorax and a slightly elevated left hemidiaphragm  Dr Rajesh Bryant recommended Tylenol and Gabapentin as needed  She returns today for her second post op visit  On discussion,  she is not having fever, chills, diarrhea, vomiting, or shortness of breath  She is having intermittent coughing, which she feels is coming from her throat  She is not having any further pain around her incisions  She still has some discomfort in her epigastric region, like a "pin" feeling  She feels like that sensation is only helped by ginger tea  The following portions of the patient's history were reviewed and updated as appropriate: allergies, current medications, past family history, past medical history, past social history, past surgical history and problem list     Review of Systems      Objective:   Physical Exam  Vitals reviewed  Constitutional:       General: She is not in acute distress  Appearance: Normal appearance  She is well-developed  She is not diaphoretic  HENT:      Head: Normocephalic and atraumatic  Eyes:      General: No scleral icterus  Extraocular Movements: Extraocular movements intact  Neck:      Trachea: No tracheal deviation  Cardiovascular:      Rate and Rhythm: Normal rate and regular rhythm  Pulses: Normal pulses  Heart sounds: Normal heart sounds  No murmur heard  Pulmonary:      Effort: Pulmonary effort is normal  No respiratory distress  Breath sounds: Normal breath sounds  No wheezing  Abdominal:      Palpations: Abdomen is soft  Musculoskeletal:         General: Normal range of motion  Cervical back: Normal range of motion and neck supple  Right lower leg: No edema  Left lower leg: No edema  Skin:     General: Skin is warm and dry  Findings: No erythema        Comments: Left robotic incisions well healed  Neurological:      Mental Status: She is alert and oriented to person, place, and time  Cranial Nerves: No cranial nerve deficit  Psychiatric:         Mood and Affect: Mood normal          Behavior: Behavior normal          Thought Content:  Thought content normal      /78   Pulse 78   Temp 98 5 °F (36 9 °C)   Resp 17   Ht 5' 4" (1 626 m)   Wt 70 2 kg (154 lb 12 2 oz)   SpO2 96%   BMI 26 57 kg/m²

## 2022-09-17 ENCOUNTER — APPOINTMENT (OUTPATIENT)
Dept: LAB | Facility: HOSPITAL | Age: 77
End: 2022-09-17
Attending: INTERNAL MEDICINE
Payer: COMMERCIAL

## 2022-09-17 ENCOUNTER — TRANSCRIBE ORDERS (OUTPATIENT)
Dept: LAB | Facility: HOSPITAL | Age: 77
End: 2022-09-17

## 2022-09-17 DIAGNOSIS — K25.9 GASTRIC ULCER DUE TO HELICOBACTER PYLORI, UNSPECIFIED CHRONICITY: ICD-10-CM

## 2022-09-17 DIAGNOSIS — B96.81 GASTRIC ULCER DUE TO HELICOBACTER PYLORI, UNSPECIFIED CHRONICITY: ICD-10-CM

## 2022-09-17 DIAGNOSIS — K86.9 DISEASE OF PANCREAS: ICD-10-CM

## 2022-09-17 DIAGNOSIS — K86.9 DISEASE OF PANCREAS: Primary | ICD-10-CM

## 2022-09-17 LAB
ALBUMIN SERPL BCP-MCNC: 4.2 G/DL (ref 3.5–5)
ALP SERPL-CCNC: 62 U/L (ref 34–104)
ALT SERPL W P-5'-P-CCNC: 13 U/L (ref 7–52)
AMYLASE SERPL-CCNC: 52 IU/L (ref 29–103)
ANION GAP SERPL CALCULATED.3IONS-SCNC: 8 MMOL/L (ref 4–13)
AST SERPL W P-5'-P-CCNC: 15 U/L (ref 13–39)
BASOPHILS # BLD AUTO: 0.03 THOUSANDS/ΜL (ref 0–0.1)
BASOPHILS NFR BLD AUTO: 1 % (ref 0–1)
BILIRUB SERPL-MCNC: 0.62 MG/DL (ref 0.2–1)
BUN SERPL-MCNC: 17 MG/DL (ref 5–25)
CALCIUM SERPL-MCNC: 9.8 MG/DL (ref 8.4–10.2)
CHLORIDE SERPL-SCNC: 105 MMOL/L (ref 96–108)
CO2 SERPL-SCNC: 28 MMOL/L (ref 21–32)
CREAT SERPL-MCNC: 0.79 MG/DL (ref 0.6–1.3)
EOSINOPHIL # BLD AUTO: 0.3 THOUSAND/ΜL (ref 0–0.61)
EOSINOPHIL NFR BLD AUTO: 7 % (ref 0–6)
ERYTHROCYTE [DISTWIDTH] IN BLOOD BY AUTOMATED COUNT: 13.9 % (ref 11.6–15.1)
GFR SERPL CREATININE-BSD FRML MDRD: 73 ML/MIN/1.73SQ M
GGT SERPL-CCNC: 14 U/L (ref 5–85)
GLUCOSE P FAST SERPL-MCNC: 95 MG/DL (ref 65–99)
HCT VFR BLD AUTO: 45.3 % (ref 34.8–46.1)
HGB BLD-MCNC: 14.7 G/DL (ref 11.5–15.4)
IMM GRANULOCYTES # BLD AUTO: 0 THOUSAND/UL (ref 0–0.2)
IMM GRANULOCYTES NFR BLD AUTO: 0 % (ref 0–2)
LIPASE SERPL-CCNC: 29 U/L (ref 11–82)
LYMPHOCYTES # BLD AUTO: 2.03 THOUSANDS/ΜL (ref 0.6–4.47)
LYMPHOCYTES NFR BLD AUTO: 48 % (ref 14–44)
MCH RBC QN AUTO: 29.3 PG (ref 26.8–34.3)
MCHC RBC AUTO-ENTMCNC: 32.5 G/DL (ref 31.4–37.4)
MCV RBC AUTO: 90 FL (ref 82–98)
MONOCYTES # BLD AUTO: 0.29 THOUSAND/ΜL (ref 0.17–1.22)
MONOCYTES NFR BLD AUTO: 7 % (ref 4–12)
NEUTROPHILS # BLD AUTO: 1.54 THOUSANDS/ΜL (ref 1.85–7.62)
NEUTS SEG NFR BLD AUTO: 37 % (ref 43–75)
NRBC BLD AUTO-RTO: 0 /100 WBCS
PLATELET # BLD AUTO: 200 THOUSANDS/UL (ref 149–390)
PMV BLD AUTO: 10 FL (ref 8.9–12.7)
POTASSIUM SERPL-SCNC: 4.2 MMOL/L (ref 3.5–5.3)
PROT SERPL-MCNC: 7.9 G/DL (ref 6.4–8.4)
RBC # BLD AUTO: 5.02 MILLION/UL (ref 3.81–5.12)
SODIUM SERPL-SCNC: 141 MMOL/L (ref 135–147)
WBC # BLD AUTO: 4.19 THOUSAND/UL (ref 4.31–10.16)

## 2022-09-17 PROCEDURE — 80053 COMPREHEN METABOLIC PANEL: CPT

## 2022-09-17 PROCEDURE — 83615 LACTATE (LD) (LDH) ENZYME: CPT

## 2022-09-17 PROCEDURE — 83690 ASSAY OF LIPASE: CPT

## 2022-09-17 PROCEDURE — 85025 COMPLETE CBC W/AUTO DIFF WBC: CPT

## 2022-09-17 PROCEDURE — 83625 ASSAY OF LDH ENZYMES: CPT

## 2022-09-17 PROCEDURE — 36415 COLL VENOUS BLD VENIPUNCTURE: CPT

## 2022-09-17 PROCEDURE — 82150 ASSAY OF AMYLASE: CPT

## 2022-09-17 PROCEDURE — 82977 ASSAY OF GGT: CPT

## 2022-09-19 LAB
LDH SERPL-CCNC: 174 IU/L (ref 119–226)
LDH1 CFR SERPL ELPH: 25 % (ref 17–32)
LDH2 CFR SERPL ELPH: 31 % (ref 25–40)
LDH3 CFR SERPL ELPH: 23 % (ref 17–27)
LDH4 CFR SERPL ELPH: 10 % (ref 5–13)
LDH5 CFR SERPL ELPH: 11 % (ref 4–20)

## 2022-09-20 ENCOUNTER — TELEPHONE (OUTPATIENT)
Dept: SURGICAL ONCOLOGY | Facility: CLINIC | Age: 77
End: 2022-09-20

## 2022-09-20 ENCOUNTER — HOSPITAL ENCOUNTER (OUTPATIENT)
Dept: ULTRASOUND IMAGING | Facility: HOSPITAL | Age: 77
Discharge: HOME | End: 2022-09-20
Attending: INTERNAL MEDICINE
Payer: COMMERCIAL

## 2022-09-20 DIAGNOSIS — K86.9 DISEASE OF PANCREAS: ICD-10-CM

## 2022-09-20 PROCEDURE — 76700 US EXAM ABDOM COMPLETE: CPT

## 2022-09-20 NOTE — TELEPHONE ENCOUNTER
Bety Hernandez called in regarding the release of her health records  She has requested her health records  She signed the release of health form when she was in the office  She is wondering if and when the form is going to be sent to [phone] and she does have the phone number to [phone]  Please give her a call back at [phone]

## 2022-09-21 NOTE — TELEPHONE ENCOUNTER
I spoke with pt I advised her to call [facility] again  There is a form already filled out in her chart to release records  Pt will call and speak with them

## 2022-09-27 ENCOUNTER — TELEPHONE (OUTPATIENT)
Dept: SURGICAL ONCOLOGY | Facility: CLINIC | Age: 77
End: 2022-09-27

## 2022-09-27 NOTE — TELEPHONE ENCOUNTER
She is having some discomfort behind her left shoulder blade and arm, which started 3 days ago  She was going to PT for it, but now is getting a work up for left epigastric pain  She is having bowel movements  She could have possibly pulled a muscle in her neck / shoulder/ back  She can try to put some warm compresses on it, and she is in agreement

## 2022-09-27 NOTE — TELEPHONE ENCOUNTER
Patient called in to inform Tamiko  that she has been having discomfort/pressure on her left side for the last 3-4 days  Her surgery was 7/21/22  She thinks it is probably gas but would like to discuss it with someone   Please call patient at [phone]

## 2022-10-07 ENCOUNTER — HOSPITAL ENCOUNTER (OUTPATIENT)
Dept: MRI IMAGING | Facility: HOSPITAL | Age: 77
End: 2022-10-07
Attending: INTERNAL MEDICINE
Payer: COMMERCIAL

## 2022-10-07 DIAGNOSIS — K86.9 DISEASE OF PANCREAS: ICD-10-CM

## 2022-10-07 PROCEDURE — G1004 CDSM NDSC: HCPCS

## 2022-10-07 PROCEDURE — 74181 MRI ABDOMEN W/O CONTRAST: CPT

## 2022-10-07 PROCEDURE — 76376 3D RENDER W/INTRP POSTPROCES: CPT

## 2022-10-10 ENCOUNTER — APPOINTMENT (OUTPATIENT)
Dept: LAB | Facility: HOSPITAL | Age: 77
End: 2022-10-10
Attending: INTERNAL MEDICINE
Payer: COMMERCIAL

## 2022-10-10 DIAGNOSIS — E05.00 TOXIC DIFFUSE GOITER WITH PRETIBIAL MYXEDEMA: ICD-10-CM

## 2022-10-10 DIAGNOSIS — E03.8 TOXIC DIFFUSE GOITER WITH PRETIBIAL MYXEDEMA: ICD-10-CM

## 2022-10-10 LAB — TSH SERPL DL<=0.05 MIU/L-ACNC: 3.51 UIU/ML (ref 0.45–4.5)

## 2022-10-10 PROCEDURE — 36415 COLL VENOUS BLD VENIPUNCTURE: CPT

## 2022-10-10 PROCEDURE — 84443 ASSAY THYROID STIM HORMONE: CPT

## 2022-10-31 ENCOUNTER — TELEPHONE (OUTPATIENT)
Dept: PULMONOLOGY | Facility: CLINIC | Age: 77
End: 2022-10-31

## 2022-10-31 NOTE — TELEPHONE ENCOUNTER
LVM for patient to schedule an appointment with Dr Prudencio Yung due to cancellations on 10/31 at 1:00 pm at our [facility] office

## 2022-11-14 ENCOUNTER — TELEPHONE (OUTPATIENT)
Dept: PULMONOLOGY | Facility: CLINIC | Age: 77
End: 2022-11-14

## 2022-11-14 NOTE — TELEPHONE ENCOUNTER
Returned patient's phone call  She describes recent increase in mucus after recent cold like symptoms  Likely viral sinusitis, recommend conservative treatment  No acute indication for abx  Patient will call office in 1 week if symptoms are not improved

## 2022-11-14 NOTE — TELEPHONE ENCOUNTER
Patient calling stating she is having a lot of phlegm and is very concerned about it and wants to speak with Nicolle Hagen   Please advise

## 2022-11-14 NOTE — TELEPHONE ENCOUNTER
Carmen Soto would like a return call regarding     What is the reason for the call/chief complaint? MUCUS    What additional symptoms are present or absent? SOB, no   Are they on O2? No Pulse O2 restingN/A When walkingN/A   chest pain/tightness, no  wheezing, no  Cough, no  mucous production,yes  What color is it WHITE  fevers/chills, no  weight gain, no  Swelling no  Pain no, does it hurt when breathing or all the time? NO    When did they start/how long have they been going on? 10/11/2022    Constant or intermittent; if intermittent describe yes? What makes it better or worse? N/A    Have you been exposed to anyone that is sick? NO    Have you been tested for COVID recently? No  AT HOME TEST NEGATVIE    Check current pulmonary medications N/A  frequency of use N/A    Have they had any other treatment or testing for this problem elsewhere? NO    Recent steroids? No    Recent Antibiotics? No     Last office visit? 8/22/2022    [id number] [address] [address]   Phone:  [phone]  Fax:  [phone]    78 or 90 day supply     PT IS REQUESTING A CALL BACK FROM DR CASTELLANOS DIRECTLY  I ADVISED HER THAT IT MAY TAKE A WHILE   SHE SAID THERE IS NO MASON AND SHE WILL WAIT

## 2023-01-11 ENCOUNTER — HOSPITAL ENCOUNTER (OUTPATIENT)
Dept: CT IMAGING | Facility: HOSPITAL | Age: 78
Discharge: HOME | End: 2023-01-11

## 2023-01-11 DIAGNOSIS — C34.92 ADENOCARCINOMA OF LEFT LUNG (HCC): ICD-10-CM

## 2023-01-13 ENCOUNTER — PATIENT OUTREACH (OUTPATIENT)
Dept: HEMATOLOGY ONCOLOGY | Facility: CLINIC | Age: 78
End: 2023-01-13

## 2023-01-13 NOTE — PROGRESS NOTES
Pt has not had any OSW needs and will be removed from ongoing follow up caseload  If the pt has any psychosocial needs moving forward, another order can be placed at that time

## 2023-01-17 ENCOUNTER — TELEPHONE (OUTPATIENT)
Dept: GYNECOLOGIC ONCOLOGY | Facility: CLINIC | Age: 78
End: 2023-01-17

## 2023-01-17 NOTE — TELEPHONE ENCOUNTER
Patient called into the office requesting her results from her CT of the Chest to be read to her   Patient can be reached back @ [phone]

## 2023-01-17 NOTE — TELEPHONE ENCOUNTER
Spoke with patient, after speaking with Dr Mee Ross  She has an appt with us next Monday and we will review CT scan results with her then  She understands

## 2023-01-20 ENCOUNTER — OFFICE VISIT (OUTPATIENT)
Dept: CARDIAC SURGERY | Facility: CLINIC | Age: 78
End: 2023-01-20

## 2023-01-20 VITALS
TEMPERATURE: 97.8 F | WEIGHT: 157.63 LBS | HEIGHT: 64 IN | DIASTOLIC BLOOD PRESSURE: 82 MMHG | RESPIRATION RATE: 20 BRPM | OXYGEN SATURATION: 97 % | BODY MASS INDEX: 26.91 KG/M2 | HEART RATE: 97 BPM | SYSTOLIC BLOOD PRESSURE: 134 MMHG

## 2023-01-20 DIAGNOSIS — J44.9 OBSTRUCTIVE LUNG DISEASE (HCC): ICD-10-CM

## 2023-01-20 DIAGNOSIS — C34.92 ADENOCARCINOMA OF LEFT LUNG (HCC): Primary | ICD-10-CM

## 2023-01-20 NOTE — ASSESSMENT & PLAN NOTE
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

## 2023-01-20 NOTE — PROGRESS NOTES
Assessment/Plan:    Adenocarcinoma of lung ([facility] )  Ms Jen Granados is 6 months out from left robotic lower lobe therapeutic wedge resection for a Stage IA2 adenocarcinoma of the lung  She is doing well from a thoracic surgery standpoint, and recent chest CT demonstrates no evidence of recurrent or metastatic lung cancer  She will return in 6 months with a repeat scan for continued lung cancer surveillance  All of her questions were addressed and she is in agreement with this plan  Diagnoses and all orders for this visit:    Adenocarcinoma of left lung ([place] [address] )  -     CT chest wo contrast; Future    Obstructive lung disease [facility])          Thoracic History   Cancer Staging   Adenocarcinoma of lung [facility])  Staging form: Lung, AJCC 8th Edition  - Clinical stage from 7/21/2022: Stage IA2 (cT1b, cN0, cM0) - Signed by Jas Nair PA-C on 8/10/2022  Histopathologic type: Adenocarcinoma, NOS  Histologic grade (G): G1  Histologic grading system: 4 grade system  Laterality: Left  Tumor size (mm): 18  Lymph-vascular invasion (LVI): LVI not present (absent)/not identified  Specimen type: Excision    Oncology History   Adenocarcinoma of lung ([facility] )   7/13/2022 Initial Diagnosis    Adenocarcinoma of lung ([place] [address] )     7/21/2022 -  Cancer Staged    Staging form: Lung, AJCC 8th Edition  - Clinical stage from 7/21/2022: Stage IA2 (cT1b, cN0, cM0) - Signed by Jas Nair PA-C on 8/10/2022  Histopathologic type: Adenocarcinoma, NOS  Histologic grade (G): G1  Histologic grading system: 4 grade system  Laterality: Left  Tumor size (mm): 18  Lymph-vascular invasion (LVI): LVI not present (absent)/not identified  Specimen type: Excision                Subjective:    Patient ID: Joeline Bamberger is a 69 y o  female  HPI   Ms Jen Granados is a 69year old female with a history of Stage IA2 Adenocarcinoma who underwent a left robotic assisted lower lobe thoracoscopic therapeutic wedge resection on 7/21/2022   Recent CT scan 1/11/23 revealed a linear scar density with rounded atelectasis in the left upper lobe  There is left lower lobe scarring  There are no findings suggestive of recurrence  On discussion, she has a rash on her abdomen which she is being treated for  She has gas and bloating  Otherwise ROS negative  The following portions of the patient's history were reviewed and updated as appropriate: allergies, current medications, past family history, past medical history, past social history, past surgical history and problem list     Review of Systems   Constitutional: Negative  Respiratory: Negative  Cardiovascular: Negative  Gastrointestinal:        Gas   Musculoskeletal: Negative  Skin: Positive for rash  Neurological: Negative  Hematological: Negative  Psychiatric/Behavioral: Negative  Objective:   Physical Exam  Constitutional:       General: She is not in acute distress  Appearance: Normal appearance  HENT:      Head: Normocephalic and atraumatic  Eyes:      General: No scleral icterus  Conjunctiva/sclera: Conjunctivae normal    Cardiovascular:      Rate and Rhythm: Normal rate and regular rhythm  Pulmonary:      Effort: Pulmonary effort is normal  No respiratory distress  Breath sounds: Normal breath sounds  Abdominal:      General: There is no distension  Palpations: Abdomen is soft  Musculoskeletal:      Cervical back: Neck supple  Lymphadenopathy:      Cervical: No cervical adenopathy  Skin:     General: Skin is warm and dry  Neurological:      General: No focal deficit present  Mental Status: She is alert and oriented to person, place, and time  Psychiatric:         Mood and Affect: Mood normal      /82   Pulse 97   Temp 97 8 °F (36 6 °C)   Resp 20   Ht 5' 4" (1 626 m)   Wt 71 5 kg (157 lb 10 1 oz)   SpO2 97%   BMI 27 06 kg/m²     XR chest pa & lateral    Result Date: 8/21/2022  Impression No acute cardiopulmonary disease   Workstation performed: [id number] XR chest pa & lateral    Result Date: 8/8/2022  Impression Postoperative changes  Workstation performed: [id number]     XR chest pa & lateral    Result Date: 7/28/2022  Impression Minimal benign bibasilar linear atelectasis  Minimal blunting of the left costophrenic sulcus which could be a trace effusion or benign postoperative pleural thickening  Workstation performed: [id number]     XR chest pa & lateral    Result Date: 7/23/2022  Impression Tiny left apical pneumothorax status post chest tube removal  The study was marked in EPIC for immediate notification  Workstation performed: [id number]      CT chest wo contrast    Result Date: 1/13/2023  Narrative CT CHEST WITHOUT IV CONTRAST INDICATION:   C34 92: Malignant neoplasm of unspecified part of left bronchus or lung  COMPARISON:  Compared with 8/23/2022 TECHNIQUE: CT examination of the chest was performed without intravenous contrast  Axial, sagittal, and coronal 2D reformatted images were created from the source data and submitted for interpretation  Radiation dose length product (DLP) for this visit:  232 mGy-cm   This examination, like all CT scans performed in the [facility], was performed utilizing techniques to minimize radiation dose exposure, including the use of iterative reconstruction and automated exposure control  FINDINGS: LUNGS: Linear scar density with rounded atelectatic area with bronchial wall thickening involving the posterior left upper lung with central calcification  Posterior left lower lobe scarring unchanged  Large suture line seen  Lungs are otherwise clear  There is no tracheal or endobronchial lesion  PLEURA:  Unremarkable  HEART/GREAT VESSELS: Heart is unremarkable for patient's age  No thoracic aortic aneurysm  MEDIASTINUM AND CRISTIANO:  Unremarkable  CHEST WALL AND LOWER NECK:  Unremarkable  VISUALIZED STRUCTURES IN THE UPPER ABDOMEN:  Unremarkable   OSSEOUS STRUCTURES:  No acute fracture or destructive osseous lesion  Impression Postsurgical changes in the left lung with scarring and atelectasis  No findings to suggest recurrence  Workstation performed: [id number]     CT chest without contrast    Result Date: 8/26/2022  Narrative CT CHEST, ABDOMEN AND PELVIS WITHOUT IV CONTRAST INDICATION:   R10 13: Epigastric pain  COMPARISON:  PET/CT 5/23/2022  Chest CT 5/8/2022  TECHNIQUE: CT examination of the chest, abdomen and pelvis was performed without intravenous contrast  Axial, sagittal, and coronal 2D reformatted images were created from the source data and submitted for interpretation  Radiation dose length product (DLP) for this visit:  528 mGy-cm  (accession [id number]), 0 mGy-cm  (accession [id number])  This examination, like all CT scans performed in the [facility], was performed utilizing techniques to minimize radiation dose exposure, including the use of iterative reconstruction and automated exposure control  Enteric contrast was administered  FINDINGS: CHEST LUNGS:  Extreme bilateral lung apices are excluded from field-of-view  Biapical pleural-parenchymal scarring  Postsurgical changes of left lower lobe wedge resection with associated atelectasis  No evidence of recurrent mass  There is no tracheal or endobronchial lesion  PLEURA:  Trace left pleural effusion  HEART/GREAT VESSELS: Heart is unremarkable for patient's age  No thoracic aortic aneurysm  MEDIASTINUM AND CRISTIANO:  Unremarkable  CHEST WALL AND LOWER NECK:  Unremarkable  ABDOMEN LIVER/BILIARY TREE:  Unremarkable  GALLBLADDER:  Gallbladder is surgically absent  SPLEEN:  Unremarkable  PANCREAS:  Unremarkable  ADRENAL GLANDS:  Unremarkable  KIDNEYS/URETERS:  Unremarkable  No hydronephrosis  STOMACH AND BOWEL:  Unremarkable  APPENDIX:  No findings to suggest appendicitis  ABDOMINOPELVIC CAVITY:  No ascites  No pneumoperitoneum  No lymphadenopathy  VESSELS:  Atherosclerotic changes are present  No evidence of aneurysm    PELVIS REPRODUCTIVE ORGANS:  Unremarkable for patient's age  URINARY BLADDER:  Unremarkable  ABDOMINAL WALL/INGUINAL REGIONS:  Unremarkable  OSSEOUS STRUCTURES:  No acute fracture or destructive osseous lesion  Degenerative changes of the pubic symphysis  Mild lumbar dextroscoliosis  Impression 1  Postsurgical changes of left lower lobe wedge resection without evidence of recurrent or metastatic disease in the chest, abdomen or pelvis  2   No significant abnormality in the abdomen or pelvis to account for the patient's pain  Workstation performed: [id number]     CT chest without contrast    Result Date: 5/10/2022  Narrative CT CHEST WITHOUT IV CONTRAST INDICATION:   R93 89: Abnormal findings on diagnostic imaging of other specified body structures  COMPARISON:  2/11/2022 TECHNIQUE: CT examination of the chest was performed without intravenous contrast  Axial, sagittal, and coronal 2D reformatted images were created from the source data and submitted for interpretation  Radiation dose length product (DLP) for this visit:  149 mGy-cm   This examination, like all CT scans performed in the [facility], was performed utilizing techniques to minimize radiation dose exposure, including the use of iterative reconstruction and automated exposure control  FINDINGS: LUNGS:  There is a stable focal opacity in the left lower lobe measuring 1 4 x 1 8 cm  Lungs are otherwise clear though evaluation is degraded by respiratory motion  There is no tracheal or endobronchial lesion  PLEURA:  Unremarkable  HEART/GREAT VESSELS: Heart is unremarkable for patient's age  No thoracic aortic aneurysm  MEDIASTINUM AND CRISTIANO:  Unremarkable  CHEST WALL AND LOWER NECK:  Unremarkable  VISUALIZED STRUCTURES IN THE UPPER ABDOMEN:  Unremarkable  OSSEOUS STRUCTURES:  No acute fracture or destructive osseous lesion  Impression Degraded by respiratory motion Stable focal opacity in the peripheral left lower lobe    Neoplasm is not excluded and further evaluation with PET/CT or biopsy is recommended  I personally discussed this study with Becca Jacob via [employer] on 5/10/2022 at 12:09 PM  Workstation performed: [id number]     No CT Chest,Abdomen,Pelvis results available for this patient  NM PET CT skull base to mid thigh    Result Date: 5/24/2022  Narrative PET/CT SCAN INDICATION:  R91 1: Solitary pulmonary nodule   The following clinical information was obtained directly from [facility]: h/o abnormal CT - stable focal opacity in peripheral Lt LL  Evaluate for malignancy  Pt is a lifelong non-smoker  Prior partial thyroidectomy  MODIFIER: PI COMPARISON: CT of chest dated 5/8/2022 and CT of abdomen and pelvis dated 1/17/2018 CELL TYPE:  Not applicable TECHNIQUE:   8 6 mCi F-18-FDG administered IV  Multiplanar attenuation corrected and non attenuation corrected PET images were acquired 60 minutes post injection  Contiguous, low dose, axial CT sections were obtained from the skull base through the femurs   Intravenous contrast material was not utilized  This examination, like all CT scans performed in the [facility], was performed utilizing techniques to minimize radiation dose exposure, including the use of iterative reconstruction and automated exposure control  Fasting serum glucose: 100 mg/dl FINDINGS: VISUALIZED BRAIN:   No acute abnormalities are seen  HEAD/NECK:   Best seen on image 55, there is mild asymmetric FDG activity involving the soft tissues adjacent to the left posterior lateral aspect of the trachea towards the level of the vocal cords and thyroid cartilage with max SUV of 4 1; it is unclear whether or not findings are related to physiologic tracer activity but given mild asymmetry, correlation with direct visualization and possibly contrast-enhanced MRI of the neck is recommended as clinically indicated  CT images: Intracranial atherosclerotic calcification is noted    Complete opacification of the right sphenoid sinus with mild mucosal thickening involving the left sphenoid sinus  CHEST:   On image 100 there is mild focal FDG activity associated with patient's known 1 9 x 1 5 cm subpleural left lower lobe lung mass with max SUV of 2 4 (for reference, max SUV of the mediastinal blood pool is 2 4)  Malignancy is the diagnosis of exclusion and further evaluation with tissue sampling is recommended  CT images: Atherosclerotic vascular calcifications including those of the coronary arteries are noted  The lung fields are better evaluated on recent dedicated CT of chest  ABDOMEN:   No FDG avid soft tissue lesions are seen  CT images: Atherosclerotic vascular calcifications are noted  Nonspecific mural thickening of the proximal to mid gastric wall, possibly related to underdistention with underlying gastric mucosal process not excluded  PELVIS: No FDG avid soft tissue lesions are seen  CT images: Unremarkable  OSSEOUS STRUCTURES: No FDG avid lesions are seen  CT images: Mild apex right scoliosis to the lumbar spine  Degenerative changes are noted involving the spine  Impression 1  Mild FDG activity associated with patient's known 1 9 cm subpleural left lower lobe lung mass for which malignancy of low metabolic activity is the diagnosis of exclusion  Further evaluation with tissue sampling is recommended  2   Mild asymmetric FDG activity involving the soft tissues about the left posterior lateral aspect of the trachea towards the level of the vocal cords, see discussion above  While findings could reflect physiologic tracer activity, given mild asymmetry, consider correlation with direct visualization and contrast-enhanced MRI of the neck as clinically indicated  Please see above for details and additional findings  The study was marked in EPIC for significant notification  Workstation performed: [id number]      No Barium Swallow results available for this patient

## 2023-01-24 ENCOUNTER — TELEPHONE (OUTPATIENT)
Dept: GASTROENTEROLOGY | Facility: CLINIC | Age: 78
End: 2023-01-24

## 2023-01-24 ENCOUNTER — OFFICE VISIT (OUTPATIENT)
Dept: PULMONOLOGY | Facility: CLINIC | Age: 78
End: 2023-01-24

## 2023-01-24 VITALS
WEIGHT: 157 LBS | SYSTOLIC BLOOD PRESSURE: 132 MMHG | OXYGEN SATURATION: 97 % | TEMPERATURE: 98.7 F | HEART RATE: 93 BPM | DIASTOLIC BLOOD PRESSURE: 80 MMHG | RESPIRATION RATE: 18 BRPM | HEIGHT: 64 IN | BODY MASS INDEX: 26.8 KG/M2

## 2023-01-24 DIAGNOSIS — J44.9 OBSTRUCTIVE LUNG DISEASE (HCC): ICD-10-CM

## 2023-01-24 DIAGNOSIS — I82.5Z9 CHRONIC DEEP VEIN THROMBOSIS (DVT) OF LOWER LEG (HCC): ICD-10-CM

## 2023-01-24 DIAGNOSIS — C34.92 ADENOCARCINOMA OF LEFT LUNG (HCC): Primary | ICD-10-CM

## 2023-01-24 DIAGNOSIS — G47.33 OSA (OBSTRUCTIVE SLEEP APNEA): ICD-10-CM

## 2023-01-24 NOTE — PROGRESS NOTES
Progress Note - Pulmonary   Primitivo Chowdhury 69 y o  female MRN: [id number]   Encounter: [id number]      Assessment/Plan:  Patient is a 58-year-old female with past medical history significant for lung cancer status postresection who presents for routine follow-up  From a respiratory standpoint, the patient reports she is doing exceedingly well  She is able to do her activities as tolerated around the house  She is quite active now returning to the gym  Her primary complaint at this time is abdominal discomfort which she is being followed by GI and is considering undergoing endoscopy  Abdominal discomfort  -  Will d/w EGD possible endoscopy    Adenocarcinoma s/p resection  -  Resection in 7/2022  -  No evidence of recurrence on follow up imaging     Obstructive sleep apnea/nocturnal hypoxia  -  Not using cpap     Dyspnea on exertion  -  Strong family history of asthma  -  Not using inhalers  -  Breathing is stable     Remote history of pulmonary embolism  -  Currently requiring only aspirin 81 mg  -  Occurred about 1990 or so  -  Last took anticoagulants in about 2011     Hypothyroidism  -  Normal TSH     1  Adenocarcinoma of left lung ([facility] [address] )    2  Obstructive lung disease ([facility] [address] )    3  DORIS (obstructive sleep apnea)    4  Chronic deep vein thrombosis (DVT) of lower leg (HCC)      Patient may follow up in 12 months or sooner as necessary  Orders:  No orders of the defined types were placed in this encounter  Subjective: The patient is not on any current therapies  She is getting a CT scan every 6 months  Her cologuard was normal       The patient notes her breathing is okay  The patient has returned to the gym  She is dancing while washing the dishes  She is concerned about her stomach  She will notice  Small point of pain in her upper stomach  She noted it after her surgery  She has a sensation of numbness on her skin  She is considering undergoing an upper endoscopy    She has not recently seen GI  Inhaler Regimen:  None    Remainder of review of systems negative except as described in HPI  The following portions of the patient's history were reviewed and updated as appropriate: allergies, current medications, past family history, past medical history, past social history, past surgical history and problem list      Objective:   Vitals: Blood pressure 132/80, pulse 93, temperature 98 7 °F (37 1 °C), temperature source Tympanic, resp  rate 18, height 5' 4" (1 626 m), weight 71 2 kg (157 lb), SpO2 97 %, not currently breastfeeding , RA, Body mass index is 26 95 kg/m²  Physical Exam  Gen: Pleasant, awake, alert, oriented x 3, no acute distress  HEENT: Mucous membranes moist, no oral lesions, no thrush  NECK: No accessory muscle use, JVP not elevated  Cardiac: RRR, single S1, single S2, no murmurs, no rubs, no gallops  Lungs: CTA b/l  Abdomen: normoactive bowel sounds, soft nontender, nondistended, no rebound or rigidity, no guarding  Extremities: no cyanosis, no clubbing, no LE edema  MSK:  Strength equal in all extremities  Derm:  No rashes/lesions noted  Neuro:  Appropriate mood/affect    Labs: I have personally reviewed pertinent lab results  Lab Results   Component Value Date    WBC 4 19 (L) 09/17/2022    HGB 14 7 09/17/2022     09/17/2022     Lab Results   Component Value Date    CREATININE 0 79 09/17/2022        Imaging and other studies: I have personally reviewed pertinent reports  and I have personally reviewed pertinent films in PACS  CT Chest 1/11/2023  My interpretation:  Linear scar w/ rounded atelectasis    Radiology findings:  LUNGS: Linear scar density with rounded atelectatic area with bronchial wall thickening involving the posterior left upper lung with central calcification  Posterior left lower lobe scarring unchanged  Large suture line seen  Lungs are otherwise   clear  There is no tracheal or endobronchial lesion  PLEURA:  Unremarkable    HEART/GREAT VESSELS: Heart is unremarkable for patient's age  No thoracic aortic aneurysm  MEDIASTINUM AND CRISTIANO:  Unremarkable  CHEST WALL AND LOWER NECK:  Unremarkable  Pulmonary Function Testing: I have personally reviewed pertinent reports  and I have personally reviewed pertinent films in PACS  6/21/2022:  Severe obstruction  FEV1/FVC Ratio:  1 53 % (56 % predicted)  Forced Vital Capacity:  0 96 L   46 % predicted  FEV1:  0 96 L    46 % predicted  After administration of bronchodilator FEV1: 0 93 (-2%)  Lung volumes by body plethysmography: Total Lung Capacity 72 % predicted Residual volume 96 % predicted  RV/TLC ratio 132 %  DLCO corrected for patients hemoglobin level:  50 %    Vandana Leung

## 2023-01-24 NOTE — TELEPHONE ENCOUNTER
Patient would like to schedule the EGD that was recommended at her last ov   Please call her to schedule egd with Dr Baldev Keenan  Thank you

## 2023-01-26 NOTE — TELEPHONE ENCOUNTER
Pt needs to be rescheduled to [facility] due to insurance and per [place] to have both colonoscopy and EGD w/ Dr Ebony Lucero    I lmom for pt to please call back to reschedule her procedures with Dr Ebony Lucero   Will call pt again in one week if do not hear back from her

## 2023-01-26 NOTE — TELEPHONE ENCOUNTER
Patient called back to schedule her Endoscopy  She stating that she is not interesting to do the colonoscopy  Please contact the patient

## 2023-01-27 NOTE — TELEPHONE ENCOUNTER
Scheduled date of EGD(as of today): 2/14/23  Physician performing EGD: Dr Marisela Marshall  Location of EGD: [facility]  Instructions reviewed with patient by: brody  Clearances: n/a

## 2023-02-03 ENCOUNTER — TELEPHONE (OUTPATIENT)
Dept: GASTROENTEROLOGY | Facility: CLINIC | Age: 78
End: 2023-02-03

## 2023-02-03 NOTE — TELEPHONE ENCOUNTER
Pt called in stating she has a condition and she only wants to speak with Dr Rika Florian  She is requesting a call back

## 2023-02-03 NOTE — TELEPHONE ENCOUNTER
lmom confirming pt's egd scheduled on 2/14/23 at [facility] with Dr Gisel Lorenzo   Informed [facility] would be calling the day prior with the arrival time  I asked pt to please call back if has not received instructions or if has any questions

## 2023-02-13 ENCOUNTER — TELEPHONE (OUTPATIENT)
Dept: OTHER | Facility: OTHER | Age: 78
End: 2023-02-13

## 2023-02-13 NOTE — TELEPHONE ENCOUNTER
Called, spoke with patient and informed her [facility] will be calling later today with her arrival time

## 2023-02-13 NOTE — TELEPHONE ENCOUNTER
Patient is calling in to see if she can get an exact time of her procedure tomorrow so she can notify her ride  Please call her if the office can obtain a time of her procedure

## 2023-02-14 ENCOUNTER — HOSPITAL ENCOUNTER (OUTPATIENT)
Dept: GASTROENTEROLOGY | Facility: HOSPITAL | Age: 78
Setting detail: OUTPATIENT SURGERY
Discharge: HOME | End: 2023-02-14
Admitting: INTERNAL MEDICINE

## 2023-02-14 ENCOUNTER — ANESTHESIA (OUTPATIENT)
Dept: GASTROENTEROLOGY | Facility: HOSPITAL | Age: 78
End: 2023-02-14

## 2023-02-14 ENCOUNTER — ANESTHESIA EVENT (OUTPATIENT)
Dept: GASTROENTEROLOGY | Facility: HOSPITAL | Age: 78
End: 2023-02-14

## 2023-02-14 VITALS
HEART RATE: 88 BPM | HEIGHT: 64 IN | TEMPERATURE: 98.3 F | SYSTOLIC BLOOD PRESSURE: 144 MMHG | RESPIRATION RATE: 17 BRPM | WEIGHT: 160 LBS | DIASTOLIC BLOOD PRESSURE: 77 MMHG | OXYGEN SATURATION: 98 % | BODY MASS INDEX: 27.31 KG/M2

## 2023-02-14 DIAGNOSIS — R10.13 EPIGASTRIC PAIN: ICD-10-CM

## 2023-02-14 RX ORDER — SODIUM CHLORIDE 9 MG/ML
INJECTION, SOLUTION INTRAVENOUS CONTINUOUS PRN
Status: DISCONTINUED | OUTPATIENT
Start: 2023-02-14 | End: 2023-02-14

## 2023-02-14 RX ORDER — PROPOFOL 10 MG/ML
INJECTION, EMULSION INTRAVENOUS AS NEEDED
Status: DISCONTINUED | OUTPATIENT
Start: 2023-02-14 | End: 2023-02-14

## 2023-02-14 RX ADMIN — PROPOFOL 80 MG: 10 INJECTION, EMULSION INTRAVENOUS at 12:40

## 2023-02-14 RX ADMIN — SODIUM CHLORIDE: 0.9 INJECTION, SOLUTION INTRAVENOUS at 12:35

## 2023-02-14 NOTE — ANESTHESIA PREPROCEDURE EVALUATION
Procedure:  EGD    Relevant Problems   ANESTHESIA   (+) PONV (postoperative nausea and vomiting)      CARDIO   (+) Chronic deep vein thrombosis (DVT) of lower leg (HCC)   (-) HTN (hypertension)   (-) Hyperlipidemia      ENDO   (+) Hypothyroidism   (+) Postoperative hypothyroidism   (-) Diabetes mellitus, type 2 (HCC)   (-) Hyperthyroidism      GI/HEPATIC   (+) Other dysphagia   (-) Gastroesophageal reflux disease      /RENAL (within normal limits)      GYN (within normal limits)      HEMATOLOGY (within normal limits)      MUSCULOSKELETAL (within normal limits)      NEURO/PSYCH   (+) History of Helicobacter pylori infection   (+) History of colon polyps      PULMONARY   (+) DORIS (obstructive sleep apnea)   (-) Asthma        TTE 1/25/2022  •  Left Ventricle: Left ventricular cavity size is normal  The left ventricular ejection fraction is 60%  Systolic function is normal  Wall motion is normal  Diastolic function is normal   •  Mitral Valve: There is mild regurgitation  •  Tricuspid Valve: There is mild regurgitation  •  Pulmonic Valve: There is mild regurgitation  Physical Exam    Airway    Mallampati score: III  TM Distance: >3 FB  Neck ROM: limited     Dental       Cardiovascular      Pulmonary      Other Findings        Anesthesia Plan  ASA Score- 3     Anesthesia Type- IV sedation with anesthesia with ASA Monitors  Additional Monitors:   Airway Plan:           Plan Factors-Exercise tolerance (METS): >4 METS  Chart reviewed  EKG reviewed  Existing labs reviewed  Patient summary reviewed  Patient is not a current smoker  Induction- intravenous  Postoperative Plan-     Informed Consent- Anesthetic plan and risks discussed with patient  I personally reviewed this patient with the CRNA  Discussed and agreed on the Anesthesia Plan with the CRNA  Yvrose Levy

## 2023-02-14 NOTE — H&P
History and Physical - [facility]  Xena Mckeon 69 y o  female MRN: [id number]                  HPI: Xena Mckeon is a 69y o  year old female who presents for history of GERD      REVIEW OF SYSTEMS: Per the HPI, and otherwise unremarkable      Historical Information   Past Medical History:   Diagnosis Date   • Adenocarcinoma of lung ([address] ) 07/13/2022   • Arthritis    • Borderline diabetes     diet controlled   • Cancer (HCC)    • Colon polyp    • CPAP (continuous positive airway pressure) dependence     refuses to use   • Depression    • Disease of thyroid gland     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   • DVT (deep venous thrombosis) (HCC)     hx- left leg   • GERD (gastroesophageal reflux disease)    • PONV (postoperative nausea and vomiting)    • Pulmonary emboli (HCC)     during pregnancy   • Rash     arms on occasion, cause unknown   • Sleep apnea      Past Surgical History:   Procedure Laterality Date   • COLONOSCOPY     • DILATION AND CURETTAGE, DIAGNOSTIC / THERAPEUTIC     • IR BIOPSY LUNG  06/15/2022   • [place] [address] INCL FLUOR GDNCE DX W/CELL WASHG SPX N/A 07/21/2022    Procedure: BRONCHOSCOPY FLEXIBLE;  Surgeon: Yesica Yip MD;  [facility];  Service: Thoracic   • [place] THORACOSCOPY W/THERA WEDGE RESEXN INITIAL UNILAT Left 07/21/2022    Procedure: RESECTION,LUNG WEDGE THORACOSCOPIC W/ ROBOTICS, w/ mediastinal lymph node dissection;  Surgeon: Yesica Yip MD;  [facility];  Service: Thoracic   • THYROIDECTOMY, PARTIAL      2020   • TONSILLECTOMY      age 11     Social History   Social History     Substance and Sexual Activity   Alcohol Use Never     Social History     Substance and Sexual Activity   Drug Use Never     Social History     Tobacco Use   Smoking Status Never   Smokeless Tobacco Never     Family History   Problem Relation Age of Onset   • No Known Problems Mother    • Cancer Sister [de-identified]   • No Known Problems Sister    • Asthma Daughter    • No Known Problems Son    • No Known Problems Maternal Aunt    • No Known Problems Maternal Aunt    • No Known Problems Paternal Aunt    • No Known Problems Paternal Aunt    • Breast cancer Other 58       Meds/Allergies       Current Outpatient Medications:   •  aspirin 81 mg chewable tablet  •  cholecalciferol (VITAMIN D3) 400 units tablet  •  cyanocobalamin (VITAMIN B-12) 100 mcg tablet  •  gabapentin (Neurontin) 300 mg capsule  •  levothyroxine 75 mcg tablet  •  loratadine (CLARITIN) 10 mg tablet  •  magnesium gluconate (MAGONATE) 500 MG tablet  •  Omega-3 Fatty Acids (fish oil) 1,000 mg  •  vitamin E 100 UNIT capsule  •  Diclofenac Sodium (VOLTAREN) 1 %    Allergies   Allergen Reactions   • Crab (Diagnostic) - Food Allergy Itching and Rash   • Iv Contrast [Iodinated Contrast Media] Rash   • Penicillins Rash       Objective     /79   Pulse 96   Temp 99 3 °F (37 4 °C) (Temporal)   Resp 16   Ht 5' 4" (1 626 m)   Wt 72 6 kg (160 lb)   SpO2 97%   BMI 27 46 kg/m²       PHYSICAL EXAM    Gen: NAD  Head: NCAT  CV: RRR  CHEST: Clear  ABD: soft, NT/ND  EXT: no edema      ASSESSMENT/PLAN:  This is a 69y o  year old female here for EGD, and she is stable and optimized for her procedure

## 2023-02-14 NOTE — ANESTHESIA POSTPROCEDURE EVALUATION
Post-Op Assessment Note    CV Status:  Stable  Pain Score: 0    Pain management: adequate     Mental Status:  Alert and awake   Hydration Status:  Euvolemic   PONV Controlled:  Controlled   Airway Patency:  Patent      Post Op Vitals Reviewed: Yes            No notable events documented      BP      Temp      Pulse     Resp      SpO2

## 2023-02-17 NOTE — RESULT ENCOUNTER NOTE
Please call the patient regarding her abnormal result  Mild gastritis    Follow up in my office as needed

## 2023-02-18 ENCOUNTER — HOSPITAL ENCOUNTER (OUTPATIENT)
Dept: MAMMOGRAPHY | Facility: HOSPITAL | Age: 78
Discharge: HOME | End: 2023-02-18

## 2023-02-18 VITALS — HEIGHT: 64 IN | BODY MASS INDEX: 27.31 KG/M2 | WEIGHT: 160 LBS

## 2023-02-18 DIAGNOSIS — Z12.31 ENCOUNTER FOR SCREENING MAMMOGRAM FOR MALIGNANT NEOPLASM OF BREAST: ICD-10-CM

## 2023-02-20 ENCOUNTER — TELEPHONE (OUTPATIENT)
Dept: PULMONOLOGY | Facility: CLINIC | Age: 78
End: 2023-02-20

## 2023-02-20 NOTE — TELEPHONE ENCOUNTER
Patient calling requesting to speak with , she wants to know if because she has been only sleeping on her right side, if that is what is causing her stomach issues. She stated she sleeps on her right side because she doesn't want to sleep on the side she had lung surgery. I advised her we can't advise on stomach issues and she insisted a message be sent. Please advise

## 2023-02-27 ENCOUNTER — PATIENT OUTREACH (OUTPATIENT)
Dept: CASE MANAGEMENT | Facility: OTHER | Age: 78
End: 2023-02-27

## 2023-02-27 NOTE — PROGRESS NOTES
Norberto Websterdavid was contacted after she left 2 messages on the RT Abigail Pope regarding questions about the [employer] [facility]  We discussed the BB Program, including topics and dates  Norberto Nicholas is interested in receiving additional updates via e-mail and gave permission to be included in the BB e-mail contact list     Thornton Yu was also asking about the reply from Dr Gabrielle Harkins that she was awaiting  She has several questions that are related to the lung procedure she had 7 months ago and would like his advice  No further RT intervention needed at this time

## 2023-03-01 ENCOUNTER — TELEPHONE (OUTPATIENT)
Dept: PULMONOLOGY | Facility: CLINIC | Age: 78
End: 2023-03-01

## 2023-03-01 ENCOUNTER — NURSE TRIAGE (OUTPATIENT)
Dept: OTHER | Facility: OTHER | Age: 78
End: 2023-03-01

## 2023-03-01 NOTE — TELEPHONE ENCOUNTER
She has an appointment with Dr Ace Mccurdy later this month, which I would encourage her to keep if she has any concern about her bowel habits  It sounds like she is going relatively regularly however, to some extent we consider it normal to have to have a bowel movement after meals and in the morning, however if this is causing her discomfort, I would suggest she trial an over-the-counter fiber supplement such as Metamucil to help bulk her stool in the meantime  At her office visit we can also discuss about further work-up if it is needed by that point, it appears she did not have colonoscopy done, which was previously recommended to her  Thank you

## 2023-03-01 NOTE — TELEPHONE ENCOUNTER
Pt states that she is having a loose BM every morning and after every meal   She also states that her stool is yellow  This has been occurring for about 3-4 days  Please call with care advice  Reason for Disposition  • [1] Abnormal color is unexplained AND [2] persists > 24 hours    Answer Assessment - Initial Assessment Questions  1  COLOR: "What color is it?" "Is that color in part or all of the stool?"      yellow  2  ONSET: "When was the unusual color first noted? "      3-4 days  3  CAUSE: "Have you eaten any food or taken any medicine of this color?" (See listing in BACKGROUND)     no  4  OTHER SYMPTOMS: "Do you have any other symptoms?" (e g , diarrhea, jaundice, abdominal pain, fever)       Pt c/o soft BM every morning and after every meal     Protocols used: STOOLS - UNUSUAL COLOR-ADULT-

## 2023-03-01 NOTE — TELEPHONE ENCOUNTER
Regarding: Soft frequent bowel movements  ----- Message from Aryan Salgado sent at 3/1/2023 10:18 AM EST -----  "My bowel movements are more frequent and very soft  I go in the morning and then I go again after I eat   I think something is going on "

## 2023-03-01 NOTE — TELEPHONE ENCOUNTER
Returned patient's phone call  She has already reached out to GI about her diarrhea  Discussed the patient's chest pain again  Maybe post surgical adhesions but this is an idea based on the CT scan  Discussed with the patient that if this is the case, there is no direct intervention to fix this  Patient has plans to travel and is regularly going to the  currently  Encouraged her to continue these activities

## 2023-03-02 NOTE — TELEPHONE ENCOUNTER
SPOKE WITH PT, ADVISED OF RECOMMENDATIONS, EDUCATED, VERBALIZES UNDERSTANDING, OFFICE VISIT SCHEDULED

## 2023-03-22 ENCOUNTER — OFFICE VISIT (OUTPATIENT)
Dept: GASTROENTEROLOGY | Facility: CLINIC | Age: 78
End: 2023-03-22

## 2023-03-22 VITALS
HEIGHT: 64 IN | DIASTOLIC BLOOD PRESSURE: 75 MMHG | WEIGHT: 161.2 LBS | SYSTOLIC BLOOD PRESSURE: 107 MMHG | HEART RATE: 92 BPM | BODY MASS INDEX: 27.52 KG/M2

## 2023-03-22 DIAGNOSIS — R10.12 LEFT UPPER QUADRANT ABDOMINAL PAIN: ICD-10-CM

## 2023-03-22 DIAGNOSIS — K21.9 GASTROESOPHAGEAL REFLUX DISEASE WITHOUT ESOPHAGITIS: ICD-10-CM

## 2023-03-22 DIAGNOSIS — R10.13 EPIGASTRIC PAIN: Primary | ICD-10-CM

## 2023-03-22 NOTE — PROGRESS NOTES
[facility] - Outpatient Follow-up Note  Linnette Cheung 69 y o  female MRN: [id number]  Encounter: [id number]          ASSESSMENT AND PLAN:      1  Epigastric pain    2  Gastroesophageal reflux disease without esophagitis    3  Left upper quadrant abdominal pain    History of GERD, epigastric discomfort, left upper quadrant discomfort, has surgical intervention in the left upper quadrant, EGD biopsies generally unremarkable  She is 90% better since watching her diet and acid reducer  Continue same follow-up with us as needed  ______________________________________________________________________    SUBJECTIVE:      Patient came in for follow-up and evaluation of her history of heartburn acid reflux indigestion, has some discomfort in the left upper quadrant, she had surgical intervention in the left rib cage, denies any dysphagia coughing choking spells, occasional bloating indigestion heartburn upper abdominal discomfort appetite is fair weight stable bowels generally regular, no apparent blood  Diet medications more than 10 pertinent systems on the current and prior records noted  REVIEW OF SYSTEMS IS OTHERWISE NEGATIVE        Historical Information   Past Medical History:   Diagnosis Date   • Adenocarcinoma of lung ([address] ) 07/13/2022   • Arthritis    • Borderline diabetes     diet controlled   • Cancer ([facility] )    • Colon polyp    • CPAP (continuous positive airway pressure) dependence     refuses to use   • Depression    • Disease of thyroid gland     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   • DVT (deep venous thrombosis) (HCC)     hx- left leg   • GERD (gastroesophageal reflux disease)    • PONV (postoperative nausea and vomiting)    • Pulmonary emboli (HCC)     during pregnancy   • Rash     arms on occasion, cause unknown   • Sleep apnea      Past Surgical History:   Procedure Laterality Date   • COLONOSCOPY     • DILATION AND CURETTAGE, DIAGNOSTIC / THERAPEUTIC     • IR BIOPSY LUNG 06/15/2022   • [place] [address] INCL FLUOR GDNCE DX W/CELL WASHG SPX N/A 07/21/2022    Procedure: BRONCHOSCOPY FLEXIBLE;  Surgeon: Vinny Pittman MD;  [facility];  Service: Thoracic   • [place] THORACOSCOPY W/THERA WEDGE RESEXN INITIAL UNILAT Left 07/21/2022    Procedure: RESECTION,LUNG WEDGE THORACOSCOPIC W/ ROBOTICS, w/ mediastinal lymph node dissection;  Surgeon: Vinny Pittman MD;  [facility];  Service: Thoracic   • THYROIDECTOMY, PARTIAL      2020   • TONSILLECTOMY      age 11   • UPPER GASTROINTESTINAL ENDOSCOPY       Social History   Social History     Substance and Sexual Activity   Alcohol Use Never     Social History     Substance and Sexual Activity   Drug Use Never     Social History     Tobacco Use   Smoking Status Never   Smokeless Tobacco Never     Family History   Problem Relation Age of Onset   • No Known Problems Mother    • Cancer Sister [de-identified]   • No Known Problems Sister    • Asthma Daughter    • No Known Problems Maternal Grandmother    • No Known Problems Maternal Grandfather    • No Known Problems Paternal Grandmother    • No Known Problems Paternal Grandfather    • No Known Problems Son    • No Known Problems Maternal Aunt    • No Known Problems Maternal Aunt    • No Known Problems Paternal Aunt    • No Known Problems Paternal Aunt    • Breast cancer Other 58   • Breast cancer additional onset Neg Hx    • BRCA2 Positive Neg Hx    • BRCA2 Negative Neg Hx    • BRCA1 Positive Neg Hx    • BRCA1 Negative Neg Hx    • BRCA 1/2 Neg Hx    • Ovarian cancer Neg Hx    • Endometrial cancer Neg Hx    • Colon cancer Neg Hx        Meds/Allergies       Current Outpatient Medications:   •  aspirin 81 mg chewable tablet  •  cholecalciferol (VITAMIN D3) 400 units tablet  •  cyanocobalamin (VITAMIN B-12) 100 mcg tablet  •  Diclofenac Sodium (VOLTAREN) 1 %  •  levothyroxine 75 mcg tablet  •  loratadine (CLARITIN) 10 mg tablet  •  magnesium gluconate (MAGONATE) 500 MG tablet  •  Omega-3 Fatty Acids (fish oil) 1,000 mg  •  vitamin E 100 UNIT capsule  •  gabapentin (Neurontin) 300 mg capsule    Allergies   Allergen Reactions   • Crab (Diagnostic) - Food Allergy Itching and Rash   • Iv Contrast [Iodinated Contrast Media] Rash   • Penicillins Rash           Objective     Blood pressure 107/75, pulse 92, height 5' 4" (1 626 m), weight 73 1 kg (161 lb 3 2 oz), not currently breastfeeding  Body mass index is 27 67 kg/m²  PHYSICAL EXAM:      General Appearance:   Alert, cooperative, no distress   HEENT:   Normocephalic, atraumatic, anicteric  Neck:  Supple, symmetrical, trachea midline   Lungs:   Clear to auscultation bilaterally; no rales, rhonchi or wheezing; respirations unlabored    Heart[de-identified]   Regular rate and rhythm; no murmur  Abdomen:   Soft, non-tender, non-distended; normal bowel sounds; no masses, no organomegaly    Genitalia:   Deferred    Rectal:   Deferred    Extremities:  No cyanosis, clubbing or edema    Skin:  No jaundice, rashes, or lesions    Lymph nodes:  No palpable cervical lymphadenopathy        Lab Results:   No visits with results within 1 Day(s) from this visit  Latest known visit with results is:   Hospital Outpatient Visit on 02/14/2023   Component Date Value   • Case Report 02/14/2023                      Value:Surgical Pathology Report                         Case: [id number]                                   Authorizing Provider:  Ninoska Arteaga MD           Collected:           02/14/2023 1243              Ordering Location:     [place] [id number]   Received:            02/14/2023 154                                     Endoscopy                                                                    Pathologist:           Sofiya Shook MD                                                       Specimen:    Stomach, cold biopsy antrum r/o H pylori                                                  • Final Diagnosis 02/14/2023                      Value: This result contains rich text formatting which cannot be displayed here  • Note 02/14/2023                      Value: This result contains rich text formatting which cannot be displayed here  • Additional Information 02/14/2023                      Value: This result contains rich text formatting which cannot be displayed here  • Gross Description 02/14/2023                      Value: This result contains rich text formatting which cannot be displayed here  Radiology Results:   No results found

## 2023-03-31 ENCOUNTER — APPOINTMENT (OUTPATIENT)
Dept: LAB | Facility: HOSPITAL | Age: 78
End: 2023-03-31
Attending: INTERNAL MEDICINE

## 2023-03-31 DIAGNOSIS — E03.8 SECONDARY HYPOTHYROIDISM: ICD-10-CM

## 2023-03-31 LAB — TSH SERPL DL<=0.05 MIU/L-ACNC: 5.07 UIU/ML (ref 0.45–4.5)

## 2023-04-06 ENCOUNTER — OFFICE VISIT (OUTPATIENT)
Dept: OBGYN CLINIC | Facility: CLINIC | Age: 78
End: 2023-04-06

## 2023-04-06 VITALS — SYSTOLIC BLOOD PRESSURE: 143 MMHG | DIASTOLIC BLOOD PRESSURE: 90 MMHG | HEART RATE: 86 BPM

## 2023-04-06 DIAGNOSIS — M19.049 HAND ARTHRITIS: Primary | ICD-10-CM

## 2023-04-06 NOTE — PROGRESS NOTES
ORTHOPAEDIC HAND, WRIST, AND ELBOW OFFICE  VISIT       ASSESSMENT/PLAN:      Diagnoses and all orders for this visit:    Hand arthritis  -     Diclofenac Sodium (VOLTAREN) 1 %; Apply 2 g topically 4 (four) times a day  69year old female with b/l hand arthritis throughout the fingers  The pathology of the diagnosis(es) was discussed with the patient today, including various treatment options  Both nonoperative and operative treatments were discussed in detail today  After our conversation, patient elects to proceed with Voltaren Gel  Patient states she is not interested in proceed with steroid injx today  The patient verbalized understanding of exam findings and treatment plan  We engaged in the shared decision-making process and treatment options were discussed at length with the patient  Surgical and conservative management discussed today along with risks and benefits  Follow Up:  PRN       To Do Next Visit:  X-rays of the  bilateral  hand    General Discussions:  Osteoarthritis:  The anatomy and physiology of osteoarthritis was discussed with the patient today in the office  Deterioration of the articular cartilage eventually leads to altered mobility at the joint, resulting in joint subluxation, osteophyte formation, cystic changes, as well as subchondral sclerosis  Eventually, pain, limited mobility, and compensatory hypermobility at surrounding joints may develop  While normal activity and usage of the joint may provide a painful experience to the patient, this typically does not result in damage to the limb  Treatment options include splints to decreased joint edema, pain, and inflammation  Therapy exercises to strengthen the surrounding musculature may relieve pain, but do not alter the overall continued development of osteoarthritis  Oral medications, topical medications, corticosteroid injections may decrease pain and increase overall function    Eventually, some patients may require surgical intervention  Karla King MD  Attending, Orthopaedic Surgery  Hand, Wrist, and Elbow Surgery  [facility]    ____________________________________________________________________________________________________________________________________________      CHIEF COMPLAINT:  Chief Complaint   Patient presents with   • Right Hand - Pain   • Left Hand - Pain       SUBJECTIVE:  Mary Sousa is a 69 y o  female who presents today for evaluation and treatment of b/l hand pain  Pt states that she has not had injuries and that she know she has arthritis in the hands  She has noticed more swelling, primarily along the R middle finger PIP joint              I have personally reviewed all the relevant PMH, PSH, SH, FH, Medications and allergies      PAST MEDICAL HISTORY:  Past Medical History:   Diagnosis Date   • Adenocarcinoma of lung ([facility] [address] ) 07/13/2022   • Arthritis    • Borderline diabetes     diet controlled   • Cancer ([facility] )    • Colon polyp    • CPAP (continuous positive airway pressure) dependence     refuses to use   • Depression    • Disease of thyroid gland     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   • DVT (deep venous thrombosis) (HCC)     hx- left leg   • GERD (gastroesophageal reflux disease)    • PONV (postoperative nausea and vomiting)    • Pulmonary emboli (HCC)     during pregnancy   • Rash     arms on occasion, cause unknown   • Sleep apnea        PAST SURGICAL HISTORY:  Past Surgical History:   Procedure Laterality Date   • COLONOSCOPY     • DILATION AND CURETTAGE, DIAGNOSTIC / THERAPEUTIC     • IR BIOPSY LUNG  06/15/2022   • [place] [address] INCL FLUOR GDNCE DX W/CELL WASHG SPX N/A 07/21/2022    Procedure: BRONCHOSCOPY FLEXIBLE;  Surgeon: Chris Mukherjee MD;  [facility];  Service: Thoracic   • [place] THORACOSCOPY W/THERA WEDGE RESEXN INITIAL UNILAT Left 07/21/2022    Procedure: RESECTION,LUNG WEDGE THORACOSCOPIC W/ ROBOTICS, w/ mediastinal lymph node dissection;  Surgeon: Jaspal Babcock MD;  [facility];  Service: Thoracic   • THYROIDECTOMY, PARTIAL      2020   • TONSILLECTOMY      age 11   • UPPER GASTROINTESTINAL ENDOSCOPY         FAMILY HISTORY:  Family History   Problem Relation Age of Onset   • No Known Problems Mother    • Cancer Sister [de-identified]   • No Known Problems Sister    • Asthma Daughter    • No Known Problems Maternal Grandmother    • No Known Problems Maternal Grandfather    • No Known Problems Paternal Grandmother    • No Known Problems Paternal Grandfather    • No Known Problems Son    • No Known Problems Maternal Aunt    • No Known Problems Maternal Aunt    • No Known Problems Paternal Aunt    • No Known Problems Paternal Aunt    • Breast cancer Other 58   • Breast cancer additional onset Neg Hx    • BRCA2 Positive Neg Hx    • BRCA2 Negative Neg Hx    • BRCA1 Positive Neg Hx    • BRCA1 Negative Neg Hx    • BRCA 1/2 Neg Hx    • Ovarian cancer Neg Hx    • Endometrial cancer Neg Hx    • Colon cancer Neg Hx        SOCIAL HISTORY:  Social History     Tobacco Use   • Smoking status: Never   • Smokeless tobacco: Never   Vaping Use   • Vaping Use: Never used   Substance Use Topics   • Alcohol use: Never   • Drug use: Never       MEDICATIONS:    Current Outpatient Medications:   •  aspirin 81 mg chewable tablet, Chew 81 mg every morning, Disp: , Rfl:   •  cholecalciferol (VITAMIN D3) 400 units tablet, Take 400 Units by mouth every morning, Disp: , Rfl:   •  cyanocobalamin (VITAMIN B-12) 100 mcg tablet, Take by mouth daily Pt unsure dose  , Disp: , Rfl:   •  Diclofenac Sodium (VOLTAREN) 1 %, Apply 2 g topically 4 (four) times a day, Disp: 100 g, Rfl: 2  •  levothyroxine 75 mcg tablet, Take 75 mcg by mouth daily in the early morning, Disp: , Rfl:   •  loratadine (CLARITIN) 10 mg tablet, Take 10 mg by mouth as needed for allergies, Disp: , Rfl:   •  magnesium gluconate (MAGONATE) 500 MG tablet, Take 500 mg by mouth 2 (two) times a day, Disp: , Rfl:   •  Omega-3 Fatty Acids (fish oil) 1,000 mg, Take 1,000 mg by mouth every morning Pt unsure dose, Disp: , Rfl:   •  vitamin E 100 UNIT capsule, Take 100 Units by mouth daily Pt unsure  , Disp: , Rfl:   •  gabapentin (Neurontin) 300 mg capsule, Take 1 capsule (300 mg total) by mouth 3 (three) times a day (Patient not taking: Reported on 3/22/2023), Disp: 63 capsule, Rfl: 0    ALLERGIES:  Allergies   Allergen Reactions   • Crab (Diagnostic) - Food Allergy Itching and Rash   • Iv Contrast [Iodinated Contrast Media] Rash   • Penicillins Rash           REVIEW OF SYSTEMS:  Musculoskeletal:        As noted in HPI  All other systems reviewed and are negative  VITALS:  Vitals:    04/06/23 1112   BP: 143/90   Pulse: 86       LABS:  HgA1c:   Lab Results   Component Value Date    HGBA1C 6 1 (H) 12/04/2019     BMP:   Lab Results   Component Value Date    CALCIUM 9 8 09/17/2022    K 4 2 09/17/2022    CO2 28 09/17/2022     09/17/2022    BUN 17 09/17/2022    CREATININE 0 79 09/17/2022       _____________________________________________________  PHYSICAL EXAMINATION:  General: well developed and well nourished, alert, oriented times 3 and appears comfortable  Psychiatric: Normal  HEENT: Normocephalic, Atraumatic Trachea Midline, No torticollis  Pulmonary: No audible wheezing or respiratory distress   Abdomen/GI: Non tender, non distended   Cardiovascular: No pitting edema, 2+ radial pulse   Skin: No masses, erythema, lacerations, fluctation, ulcerations  Neurovascular: Sensation Intact to the Median, Ulnar, Radial Nerve, Motor Intact to the Median, Ulnar, Radial Nerve and Pulses Intact  Musculoskeletal: Normal, except as noted in detailed exam and in HPI  MUSCULOSKELETAL EXAMINATION:  Bilateral Hands:  Pt with obvious arthritis deformity with presence of Heberden's and Odilon's nodes throughout the fingers  Pt is noted to have some edema of the middle PIP joint  FDS/FDP/Extensors intact  Compartments soft    Brisk capillary refill     ___________________________________________________  STUDIES REVIEWED:  No studies to review         PROCEDURES PERFORMED:  Procedures  No Procedures performed today    _____________________________________________________      Milka Minors    I,:  Britney Guadalupe PA-C am acting as a scribe while in the presence of the attending physician :       I,:  Marcelo Dominguez MD personally performed the services described in this documentation    as scribed in my presence :

## 2023-05-03 ENCOUNTER — APPOINTMENT (OUTPATIENT)
Dept: LAB | Facility: HOSPITAL | Age: 78
End: 2023-05-03
Attending: INTERNAL MEDICINE

## 2023-05-03 DIAGNOSIS — E03.8 TOXIC DIFFUSE GOITER WITH PRETIBIAL MYXEDEMA: ICD-10-CM

## 2023-05-03 DIAGNOSIS — R73.9 BLOOD GLUCOSE ELEVATED: ICD-10-CM

## 2023-05-03 DIAGNOSIS — E78.49 FAMILIAL COMBINED HYPERLIPIDEMIA: ICD-10-CM

## 2023-05-03 DIAGNOSIS — E05.00 TOXIC DIFFUSE GOITER WITH PRETIBIAL MYXEDEMA: ICD-10-CM

## 2023-05-03 DIAGNOSIS — C34.92 SQUAMOUS CARCINOMA OF LUNG, LEFT (HCC): ICD-10-CM

## 2023-05-03 LAB
ALBUMIN SERPL BCP-MCNC: 4.2 G/DL (ref 3.5–5)
ALP SERPL-CCNC: 63 U/L (ref 34–104)
ALT SERPL W P-5'-P-CCNC: 13 U/L (ref 7–52)
ANION GAP SERPL CALCULATED.3IONS-SCNC: 7 MMOL/L (ref 4–13)
AST SERPL W P-5'-P-CCNC: 16 U/L (ref 13–39)
BASOPHILS # BLD AUTO: 0.03 THOUSANDS/ÂΜL (ref 0–0.1)
BASOPHILS NFR BLD AUTO: 1 % (ref 0–1)
BILIRUB SERPL-MCNC: 0.7 MG/DL (ref 0.2–1)
BUN SERPL-MCNC: 17 MG/DL (ref 5–25)
CALCIUM SERPL-MCNC: 10 MG/DL (ref 8.4–10.2)
CHLORIDE SERPL-SCNC: 106 MMOL/L (ref 96–108)
CHOLEST SERPL-MCNC: 220 MG/DL
CO2 SERPL-SCNC: 29 MMOL/L (ref 21–32)
CREAT SERPL-MCNC: 0.84 MG/DL (ref 0.6–1.3)
EOSINOPHIL # BLD AUTO: 0.28 THOUSAND/ÂΜL (ref 0–0.61)
EOSINOPHIL NFR BLD AUTO: 7 % (ref 0–6)
ERYTHROCYTE [DISTWIDTH] IN BLOOD BY AUTOMATED COUNT: 13.6 % (ref 11.6–15.1)
GFR SERPL CREATININE-BSD FRML MDRD: 67 ML/MIN/1.73SQ M
GLUCOSE P FAST SERPL-MCNC: 98 MG/DL (ref 65–99)
HCT VFR BLD AUTO: 45.9 % (ref 34.8–46.1)
HDLC SERPL-MCNC: 51 MG/DL
HGB BLD-MCNC: 14.5 G/DL (ref 11.5–15.4)
IMM GRANULOCYTES # BLD AUTO: 0.01 THOUSAND/UL (ref 0–0.2)
IMM GRANULOCYTES NFR BLD AUTO: 0 % (ref 0–2)
LDLC SERPL CALC-MCNC: 153 MG/DL (ref 0–100)
LYMPHOCYTES # BLD AUTO: 2.07 THOUSANDS/ÂΜL (ref 0.6–4.47)
LYMPHOCYTES NFR BLD AUTO: 52 % (ref 14–44)
MCH RBC QN AUTO: 28.8 PG (ref 26.8–34.3)
MCHC RBC AUTO-ENTMCNC: 31.6 G/DL (ref 31.4–37.4)
MCV RBC AUTO: 91 FL (ref 82–98)
MONOCYTES # BLD AUTO: 0.37 THOUSAND/ÂΜL (ref 0.17–1.22)
MONOCYTES NFR BLD AUTO: 9 % (ref 4–12)
NEUTROPHILS # BLD AUTO: 1.26 THOUSANDS/ÂΜL (ref 1.85–7.62)
NEUTS SEG NFR BLD AUTO: 31 % (ref 43–75)
NONHDLC SERPL-MCNC: 169 MG/DL
NRBC BLD AUTO-RTO: 0 /100 WBCS
PLATELET # BLD AUTO: 190 THOUSANDS/UL (ref 149–390)
PMV BLD AUTO: 10.3 FL (ref 8.9–12.7)
POTASSIUM SERPL-SCNC: 4.6 MMOL/L (ref 3.5–5.3)
PROT SERPL-MCNC: 7.9 G/DL (ref 6.4–8.4)
RBC # BLD AUTO: 5.04 MILLION/UL (ref 3.81–5.12)
SODIUM SERPL-SCNC: 142 MMOL/L (ref 135–147)
TRIGL SERPL-MCNC: 80 MG/DL
TSH SERPL DL<=0.05 MIU/L-ACNC: 0.93 UIU/ML (ref 0.45–4.5)
WBC # BLD AUTO: 4.02 THOUSAND/UL (ref 4.31–10.16)

## 2023-05-05 LAB
EST. AVERAGE GLUCOSE BLD GHB EST-MCNC: 123 MG/DL
HBA1C MFR BLD: 5.9 %

## 2023-05-10 ENCOUNTER — NURSE TRIAGE (OUTPATIENT)
Dept: OTHER | Facility: OTHER | Age: 78
End: 2023-05-10

## 2023-05-10 NOTE — TELEPHONE ENCOUNTER
"Regarding: abdominal pain  ----- Message from Rachna Barboza RN sent at 5/10/2023  2:50 PM EDT -----  \"I am still having abdominal pain, and I'm not sure why  I had an EGD a few months ago, but nothing is better  \"    "

## 2023-05-10 NOTE — TELEPHONE ENCOUNTER
"Patient reports that ever since her surgery on 7/1/23 (BRONCHOSCOPY FLEXIBLE ([place])       RESECTION,LUNG WEDGE THORACOSCOPIC W/ ROBOTICS, w/ mediastinal lymph node dissection (Left: Chest) she has been experiencing intermittent moderate pain on the L side of her abdomen  She reports her colonoscopy and EGD did not show anything and would like a call back with care advice  Reason for Disposition  • MODERATE pain (e g , interferes with normal activities that comes and goes (cramps) lasts > 24 hours (Exception: pain with Vomiting or Diarrhea - see that Protocol)    Answer Assessment - Initial Assessment Questions  1  LOCATION: \"Where does it hurt? \"       Upper, above umbilicus, left   2  RADIATION: \"Does the pain shoot anywhere else? \" (e g , chest, back)      Denies   3  ONSET: \"When did the pain begin? \" (e g , minutes, hours or days ago)       Ongoing   4  SUDDEN: \"Gradual or sudden onset? \"      Sudden, off and on  5  PATTERN \"Does the pain come and go, or is it constant? \"     - If constant: \"Is it getting better, staying the same, or worsening? \"       (Note: Constant means the pain never goes away completely; most serious pain is constant and it progresses)      - If intermittent: \"How long does it last?\" \"Do you have pain now? \"      (Note: Intermittent means the pain goes away completely between bouts)      Intermittent, lasts   6  SEVERITY: \"How bad is the pain? \"  (e g , Scale 1-10; mild, moderate, or severe)    - MILD (1-3): doesn't interfere with normal activities, abdomen soft and not tender to touch     - MODERATE (4-7): interferes with normal activities or awakens from sleep, tender to touch     - SEVERE (8-10): excruciating pain, doubled over, unable to do any normal activities       Mild-moderate  7  RECURRENT SYMPTOM: \"Have you ever had this type of stomach pain before? \" If Yes, ask: \"When was the last time? \" and \"What happened that time? \"       Yes, GI  8  CAUSE: \"What do you think is causing the " "stomach pain? \"      Unaware   9  RELIEVING/AGGRAVATING FACTORS: \"What makes it better or worse? \" (e g , movement, antacids, bowel movement)      N/A   10  OTHER SYMPTOMS: \"Has there been any vomiting, diarrhea, constipation, or urine problems? \"        Denies   11  PREGNANCY: \"Is there any chance you are pregnant? \" \"When was your last menstrual period? \"       N/A    Protocols used: ABDOMINAL PAIN - FEMALE-ADULT-[place]    "

## 2023-05-10 NOTE — TELEPHONE ENCOUNTER
Reviewed chart, was just seen in March by Dr Christ Crowley for the same  It appears she was improved at that time due to diet and PPI use  I don't see PPI in her chart anymore  Please find out if she stopped this, can consider restarting it if it helped   Can also schedule office follow up as well to further discuss as her procedure was July 2022 and the pain may not be GI related either, EGD last year was normal

## 2023-05-11 NOTE — TELEPHONE ENCOUNTER
PT SCHEDULED FOR OFFICE VISIT ON 5/18/23, LMOM FOR PT TO RETURN CALL TO DISCUSS SYMPTOMS AND IF SHE IS STILL TAKING OMEPRAZOLE

## 2023-05-18 ENCOUNTER — OFFICE VISIT (OUTPATIENT)
Dept: GASTROENTEROLOGY | Facility: CLINIC | Age: 78
End: 2023-05-18

## 2023-05-18 VITALS
SYSTOLIC BLOOD PRESSURE: 117 MMHG | DIASTOLIC BLOOD PRESSURE: 71 MMHG | BODY MASS INDEX: 27.62 KG/M2 | HEART RATE: 75 BPM | WEIGHT: 161.8 LBS | HEIGHT: 64 IN

## 2023-05-18 DIAGNOSIS — K21.9 GASTROESOPHAGEAL REFLUX DISEASE WITHOUT ESOPHAGITIS: ICD-10-CM

## 2023-05-18 DIAGNOSIS — Z86.010 HX OF COLONIC POLYPS: ICD-10-CM

## 2023-05-18 DIAGNOSIS — C34.92 ADENOCARCINOMA OF LEFT LUNG (HCC): ICD-10-CM

## 2023-05-18 DIAGNOSIS — R10.12 LEFT UPPER QUADRANT ABDOMINAL PAIN: Primary | ICD-10-CM

## 2023-05-18 RX ORDER — AMPICILLIN TRIHYDRATE 250 MG
CAPSULE ORAL
COMMUNITY

## 2023-05-18 RX ORDER — SACCHAROMYCES BOULARDII 250 MG
250 CAPSULE ORAL 2 TIMES DAILY
COMMUNITY

## 2023-05-18 NOTE — PROGRESS NOTES
[facility] - Outpatient Follow-up Note  Link Jonathon 69 y o  female MRN: [id number]  Encounter: [id number]          ASSESSMENT AND PLAN:      1  Left upper quadrant abdominal pain    2  Gastroesophageal reflux disease without esophagitis    3  Hx of colonic polyps    4  Adenocarcinoma of left lung (HCC)    History of discomfort in the left upper quadrant, left rib margin, she did have lung surgery done near the ribs for adenocarcinoma  This pain may be spastic pain  So far work-up in the past with CT scan MRI EGD generally unremarkable  Symptomatic treatment, follow-up with me as needed  Antireflux measures again discussed  ______________________________________________________________________      SUBJECTIVE:     Patient complains of left upper quadrant discomfort, spastic pain, like pulsating and can last for short period of time then goes away  Cannot associate this with any specific diet activity stress foods or bowel movements  Denies any  symptoms  Appetite good weight stable, denies dysphagia coughing choking spells, occasional indigestion  Bowels are regular  Diet medications more than 10 pertinent systems reviewed, EGD results and biopsy reviewed with the patient  REVIEW OF SYSTEMS IS OTHERWISE NEGATIVE        Historical Information   Past Medical History:   Diagnosis Date   • Adenocarcinoma of lung ([facility] ) 07/13/2022   • Arthritis    • Borderline diabetes     diet controlled   • Cancer ([facility] )    • Colon polyp    • CPAP (continuous positive airway pressure) dependence     refuses to use   • Depression    • Disease of thyroid gland     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   • DVT (deep venous thrombosis) (HCC)     hx- left leg   • GERD (gastroesophageal reflux disease)    • Hyperlipidemia    • PONV (postoperative nausea and vomiting)    • Pulmonary emboli (HCC)     during pregnancy   • Rash     arms on occasion, cause unknown   • Sleep apnea      Past Surgical History: Procedure Laterality Date   • COLONOSCOPY     • DILATION AND CURETTAGE, DIAGNOSTIC / THERAPEUTIC     • IR BIOPSY LUNG  06/15/2022   • [place] [address] INCL FLUOR GDNCE DX W/CELL WASHG SPX N/A 07/21/2022    Procedure: BRONCHOSCOPY FLEXIBLE;  Surgeon: David Saenz MD;  [facility];  Service: Thoracic   • [place] THORACOSCOPY W/THERA WEDGE RESEXN INITIAL UNILAT Left 07/21/2022    Procedure: RESECTION,LUNG WEDGE THORACOSCOPIC W/ ROBOTICS, w/ mediastinal lymph node dissection;  Surgeon: David Saenz MD;  [facility];  Service: Thoracic   • THYROIDECTOMY, PARTIAL      2020   • TONSILLECTOMY      age 11   • UPPER GASTROINTESTINAL ENDOSCOPY       Social History   Social History     Substance and Sexual Activity   Alcohol Use Never     Social History     Substance and Sexual Activity   Drug Use Never     Social History     Tobacco Use   Smoking Status Never   Smokeless Tobacco Never     Family History   Problem Relation Age of Onset   • No Known Problems Mother    • Cancer Sister [de-identified]   • No Known Problems Sister    • Asthma Daughter    • No Known Problems Maternal Grandmother    • No Known Problems Maternal Grandfather    • No Known Problems Paternal Grandmother    • No Known Problems Paternal Grandfather    • No Known Problems Son    • No Known Problems Maternal Aunt    • No Known Problems Maternal Aunt    • No Known Problems Paternal Aunt    • No Known Problems Paternal Aunt    • Breast cancer Other 58   • Breast cancer additional onset Neg Hx    • BRCA2 Positive Neg Hx    • BRCA2 Negative Neg Hx    • BRCA1 Positive Neg Hx    • BRCA1 Negative Neg Hx    • BRCA 1/2 Neg Hx    • Ovarian cancer Neg Hx    • Endometrial cancer Neg Hx    • Colon cancer Neg Hx        Meds/Allergies       Current Outpatient Medications:   •  aspirin 81 mg chewable tablet  •  cholecalciferol (VITAMIN D3) 400 units tablet  •  cyanocobalamin (VITAMIN B-12) 100 mcg tablet  •  Diclofenac Sodium (VOLTAREN) 1 %  •  levothyroxine 75 mcg "tablet  •  loratadine (CLARITIN) 10 mg tablet  •  magnesium gluconate (MAGONATE) 500 MG tablet  •  Omega-3 Fatty Acids (fish oil) 1,000 mg  •  Red Yeast Rice 600 MG CAPS  •  saccharomyces boulardii (FLORASTOR) 250 mg capsule  •  vitamin E 100 UNIT capsule  •  gabapentin (Neurontin) 300 mg capsule    Allergies   Allergen Reactions   • Crab (Diagnostic) - Food Allergy Itching and Rash   • Iv Contrast [Iodinated Contrast Media] Rash   • Penicillins Rash           Objective     Blood pressure 117/71, pulse 75, height 5' 4\" (1 626 m), weight 73 4 kg (161 lb 12 8 oz), not currently breastfeeding  Body mass index is 27 77 kg/m²  PHYSICAL EXAM:      General Appearance:   Alert, cooperative, no distress   HEENT:   Normocephalic, atraumatic, anicteric  Neck:  Supple, symmetrical, trachea midline   Lungs:   Clear to auscultation bilaterally; no rales, rhonchi or wheezing; respirations unlabored    Heart[de-identified]   Regular rate and rhythm; no murmur  Abdomen:   Soft, non-tender, non-distended; normal bowel sounds; no masses, no organomegaly    Genitalia:   Deferred    Rectal:   Deferred    Extremities:  No cyanosis, clubbing or edema    Skin:  No jaundice, rashes, or lesions    Lymph nodes:  No palpable cervical lymphadenopathy        Lab Results:   No visits with results within 1 Day(s) from this visit     Latest known visit with results is:   Appointment on 05/03/2023   Component Date Value   • WBC 05/03/2023 4 02 (L)    • RBC 05/03/2023 5 04    • Hemoglobin 05/03/2023 14 5    • Hematocrit 05/03/2023 45 9    • MCV 05/03/2023 91    • MCH 05/03/2023 28 8    • MCHC 05/03/2023 31 6    • RDW 05/03/2023 13 6    • MPV 05/03/2023 10 3    • Platelets 87/95/1972 190    • nRBC 05/03/2023 0    • Neutrophils Relative 05/03/2023 31 (L)    • Immat GRANS % 05/03/2023 0    • Lymphocytes Relative 05/03/2023 52 (H)    • Monocytes Relative 05/03/2023 9    • Eosinophils Relative 05/03/2023 7 (H)    • Basophils Relative 05/03/2023 1    • " Neutrophils Absolute 05/03/2023 1 26 (L)    • Immature Grans Absolute 05/03/2023 0 01    • Lymphocytes Absolute 05/03/2023 2 07    • Monocytes Absolute 05/03/2023 0 37    • Eosinophils Absolute 05/03/2023 0 28    • Basophils Absolute 05/03/2023 0 03    • Sodium 05/03/2023 142    • Potassium 05/03/2023 4 6    • Chloride 05/03/2023 106    • CO2 05/03/2023 29    • ANION GAP 05/03/2023 7    • BUN 05/03/2023 17    • Creatinine 05/03/2023 0 84    • Glucose, Fasting 05/03/2023 98    • Calcium 05/03/2023 10 0    • AST 05/03/2023 16    • ALT 05/03/2023 13    • Alkaline Phosphatase 05/03/2023 63    • Total Protein 05/03/2023 7 9    • Albumin 05/03/2023 4 2    • Total Bilirubin 05/03/2023 0 70    • eGFR 05/03/2023 67    • Cholesterol 05/03/2023 220 (H)    • Triglycerides 05/03/2023 80    • HDL, Direct 05/03/2023 51    • LDL Calculated 05/03/2023 153 (H)    • Non-HDL-Chol (CHOL-HDL) 05/03/2023 169    • TSH 3RD GENERATON 05/03/2023 0 934    • Hemoglobin A1C 05/03/2023 5 9 (H)    • EAG 05/03/2023 123          Radiology Results:   No results found

## 2023-06-23 ENCOUNTER — APPOINTMENT (OUTPATIENT)
Dept: LAB | Facility: HOSPITAL | Age: 78
End: 2023-06-23
Attending: INTERNAL MEDICINE
Payer: COMMERCIAL

## 2023-06-23 DIAGNOSIS — E03.9 ACQUIRED HYPOTHYROIDISM: ICD-10-CM

## 2023-06-23 DIAGNOSIS — E55.9 AVITAMINOSIS D: ICD-10-CM

## 2023-06-23 DIAGNOSIS — D70.9 CHRONIC IDIOPATHIC NEUTROPENIA (HCC): ICD-10-CM

## 2023-06-23 DIAGNOSIS — E78.49 FAMILIAL COMBINED HYPERLIPIDEMIA: ICD-10-CM

## 2023-06-23 DIAGNOSIS — E03.8 SECONDARY HYPOTHYROIDISM: ICD-10-CM

## 2023-06-23 DIAGNOSIS — I10 ESSENTIAL HYPERTENSION, MALIGNANT: ICD-10-CM

## 2023-06-23 LAB
ALBUMIN SERPL BCP-MCNC: 4.1 G/DL (ref 3.5–5)
ALP SERPL-CCNC: 60 U/L (ref 34–104)
ALT SERPL W P-5'-P-CCNC: 12 U/L (ref 7–52)
ANION GAP SERPL CALCULATED.3IONS-SCNC: 6 MMOL/L
AST SERPL W P-5'-P-CCNC: 16 U/L (ref 13–39)
BASOPHILS # BLD AUTO: 0.03 THOUSANDS/ÂΜL (ref 0–0.1)
BASOPHILS NFR BLD AUTO: 1 % (ref 0–1)
BILIRUB SERPL-MCNC: 0.72 MG/DL (ref 0.2–1)
BUN SERPL-MCNC: 19 MG/DL (ref 5–25)
CALCIUM SERPL-MCNC: 10 MG/DL (ref 8.4–10.2)
CHLORIDE SERPL-SCNC: 109 MMOL/L (ref 96–108)
CO2 SERPL-SCNC: 27 MMOL/L (ref 21–32)
CREAT SERPL-MCNC: 0.78 MG/DL (ref 0.6–1.3)
EOSINOPHIL # BLD AUTO: 0.29 THOUSAND/ÂΜL (ref 0–0.61)
EOSINOPHIL NFR BLD AUTO: 7 % (ref 0–6)
ERYTHROCYTE [DISTWIDTH] IN BLOOD BY AUTOMATED COUNT: 13.3 % (ref 11.6–15.1)
GFR SERPL CREATININE-BSD FRML MDRD: 74 ML/MIN/1.73SQ M
GLUCOSE P FAST SERPL-MCNC: 100 MG/DL (ref 65–99)
HCT VFR BLD AUTO: 45.8 % (ref 34.8–46.1)
HGB BLD-MCNC: 14.5 G/DL (ref 11.5–15.4)
IMM GRANULOCYTES # BLD AUTO: 0.01 THOUSAND/UL (ref 0–0.2)
IMM GRANULOCYTES NFR BLD AUTO: 0 % (ref 0–2)
LYMPHOCYTES # BLD AUTO: 2.03 THOUSANDS/ÂΜL (ref 0.6–4.47)
LYMPHOCYTES NFR BLD AUTO: 52 % (ref 14–44)
MCH RBC QN AUTO: 29.2 PG (ref 26.8–34.3)
MCHC RBC AUTO-ENTMCNC: 31.7 G/DL (ref 31.4–37.4)
MCV RBC AUTO: 92 FL (ref 82–98)
MONOCYTES # BLD AUTO: 0.28 THOUSAND/ÂΜL (ref 0.17–1.22)
MONOCYTES NFR BLD AUTO: 7 % (ref 4–12)
NEUTROPHILS # BLD AUTO: 1.29 THOUSANDS/ÂΜL (ref 1.85–7.62)
NEUTS SEG NFR BLD AUTO: 33 % (ref 43–75)
NRBC BLD AUTO-RTO: 0 /100 WBCS
PLATELET # BLD AUTO: 191 THOUSANDS/UL (ref 149–390)
PMV BLD AUTO: 10.3 FL (ref 8.9–12.7)
POTASSIUM SERPL-SCNC: 4.2 MMOL/L (ref 3.5–5.3)
PROT SERPL-MCNC: 8 G/DL (ref 6.4–8.4)
RBC # BLD AUTO: 4.96 MILLION/UL (ref 3.81–5.12)
SODIUM SERPL-SCNC: 142 MMOL/L (ref 135–147)
TSH SERPL DL<=0.05 MIU/L-ACNC: 0.25 UIU/ML (ref 0.45–4.5)
WBC # BLD AUTO: 3.93 THOUSAND/UL (ref 4.31–10.16)

## 2023-06-23 PROCEDURE — 36415 COLL VENOUS BLD VENIPUNCTURE: CPT

## 2023-06-23 PROCEDURE — 85025 COMPLETE CBC W/AUTO DIFF WBC: CPT

## 2023-06-23 PROCEDURE — 84443 ASSAY THYROID STIM HORMONE: CPT

## 2023-06-23 PROCEDURE — 80053 COMPREHEN METABOLIC PANEL: CPT

## 2023-07-07 ENCOUNTER — HOSPITAL ENCOUNTER (OUTPATIENT)
Dept: CT IMAGING | Facility: HOSPITAL | Age: 78
End: 2023-07-07
Payer: COMMERCIAL

## 2023-07-07 DIAGNOSIS — C34.92 ADENOCARCINOMA OF LEFT LUNG (HCC): ICD-10-CM

## 2023-07-07 PROCEDURE — 71250 CT THORAX DX C-: CPT

## 2023-07-14 ENCOUNTER — OFFICE VISIT (OUTPATIENT)
Dept: CARDIAC SURGERY | Facility: CLINIC | Age: 78
End: 2023-07-14
Payer: COMMERCIAL

## 2023-07-14 VITALS
TEMPERATURE: 98.2 F | OXYGEN SATURATION: 96 % | HEART RATE: 80 BPM | RESPIRATION RATE: 16 BRPM | WEIGHT: 155.2 LBS | BODY MASS INDEX: 26.5 KG/M2 | SYSTOLIC BLOOD PRESSURE: 140 MMHG | DIASTOLIC BLOOD PRESSURE: 82 MMHG | HEIGHT: 64 IN

## 2023-07-14 DIAGNOSIS — C34.92 ADENOCARCINOMA OF LEFT LUNG (HCC): Primary | ICD-10-CM

## 2023-07-14 PROCEDURE — 99213 OFFICE O/P EST LOW 20 MIN: CPT | Performed by: PHYSICIAN ASSISTANT

## 2023-07-14 RX ORDER — LEVOTHYROXINE SODIUM 0.1 MG/1
100 TABLET ORAL DAILY
COMMUNITY
Start: 2023-06-28

## 2023-07-14 NOTE — PROGRESS NOTES
Assessment/Plan:    Adenocarcinoma of lung ([address])  Jessica Gordon is stable from a thoracic surgery and lung cancer standpoint. Her most recent CT scan did not reveal any evidence of recurrent or metastatic disease. We will see her back in 6 months with a new CT scan and she can be seen by one of the PA's. She is in agreement with the plan. Diagnoses and all orders for this visit:    Adenocarcinoma of left lung ([address])  -     CT chest wo contrast; Future    Other orders  -     levothyroxine 100 mcg tablet; Take 100 mcg by mouth daily        Thoracic History   Cancer Staging   Adenocarcinoma of lung [facility])  Staging form: Lung, AJCC 8th Edition  - Clinical stage from 7/21/2022: Stage IA2 (cT1b, cN0, cM0) - Signed by Edith Child PA-C on 8/10/2022  Histopathologic type: Adenocarcinoma, NOS  Histologic grade (G): G1  Histologic grading system: 4 grade system  Laterality: Left  Tumor size (mm): 18  Lymph-vascular invasion (LVI): LVI not present (absent)/not identified  Specimen type: Excision    Oncology History   Adenocarcinoma of lung ([address])   7/13/2022 Initial Diagnosis    Adenocarcinoma of lung ([address])     7/21/2022 -  Cancer Staged    Staging form: Lung, AJCC 8th Edition  - Clinical stage from 7/21/2022: Stage IA2 (cT1b, cN0, cM0) - Signed by Edith Child PA-C on 8/10/2022  Histopathologic type: Adenocarcinoma, NOS  Histologic grade (G): G1  Histologic grading system: 4 grade system  Laterality: Left  Tumor size (mm): 18  Lymph-vascular invasion (LVI): LVI not present (absent)/not identified  Specimen type: Excision             Patient ID: Mcdonald Saint is a 69 y.o. female. ECOG 1     HPI     Jessica Gordon is a 70 yo female who underwent a left robotic assisted lower lobe therapeutic wedge resection on 7/21/22 for stage IA2 adenocarcinoma. She was last seen on 1/20/23 at which point her CT scan revealed a linear scar density with rounded atelectasis in the left upper lobe and lower lobe scarring.      She returns today, 1 year out from resection, with a CT from 7/7/23. This demonstrates continued rounded atelectasis along left lung suture posteriorly along pleural and adjacent subpleural scarring. No lymphadenopathy. On discussion, she is a lifetime non smoker. She denies fever, chills, cough, shortness of breath, weight loss, headaches, or new bone pains. She has left laser eye surgery. The following portions of the patient's history were reviewed and updated as appropriate: allergies, current medications, past family history, past medical history, past social history, past surgical history and problem list.    Review of Systems      Objective:   Physical Exam  Vitals reviewed. Constitutional:       General: She is not in acute distress. Appearance: Normal appearance. She is well-developed. She is not diaphoretic. HENT:      Head: Normocephalic and atraumatic. Eyes:      General: No scleral icterus. Extraocular Movements: Extraocular movements intact. Comments: + glasses   Neck:      Trachea: No tracheal deviation. Cardiovascular:      Rate and Rhythm: Normal rate and regular rhythm. Heart sounds: Normal heart sounds. No murmur heard. Pulmonary:      Effort: Pulmonary effort is normal. No respiratory distress. Breath sounds: Normal breath sounds. No wheezing. Abdominal:      Palpations: Abdomen is soft. Musculoskeletal:         General: Normal range of motion. Cervical back: Normal range of motion and neck supple. Right lower leg: No edema. Left lower leg: No edema. Lymphadenopathy:      Cervical: No cervical adenopathy. Skin:     General: Skin is warm and dry. Comments:     Neurological:      Mental Status: She is alert and oriented to person, place, and time. Gait: Gait normal.   Psychiatric:         Mood and Affect: Mood normal.         Behavior: Behavior normal.         Thought Content:  Thought content normal.     /82 (BP Location: Left arm, Patient Position: Sitting, Cuff Size: Standard)   Pulse 80   Temp 98.2 °F (36.8 °C) (Temporal)   Resp 16   Ht 5' 4" (1.626 m)   Wt 70.4 kg (155 lb 3.3 oz)   SpO2 96%   BMI 26.64 kg/m²          CT chest wo contrast    Result Date: 7/11/2023  Narrative CT CHEST WITHOUT IV CONTRAST INDICATION:   C34.92: Malignant neoplasm of unspecified part of left bronchus or lung. COMPARISON: Compared with 1/11/2023 TECHNIQUE: CT examination of the chest was performed without intravenous contrast. Multiplanar 2D reformatted images were created from the source data. This examination, like all CT scans performed in the [facility], was performed utilizing techniques to minimize radiation dose exposure, including the use of iterative reconstruction and automated exposure control. Radiation dose length product (DLP) for this visit:  262 mGy-cm FINDINGS: LUNGS: Rounded atelectasis along the left lung suture posteriorly along the pleura. Adjacent subpleural scarring. Lungs are otherwise clear. There is no tracheal or endobronchial lesion. PLEURA:  Unremarkable. HEART/GREAT VESSELS: Heart is unremarkable for patient's age. No thoracic aortic aneurysm. Coronary vascular calcifications. MEDIASTINUM AND CRISTIANO:  Unremarkable. CHEST WALL AND LOWER NECK:  Unremarkable. VISUALIZED STRUCTURES IN THE UPPER ABDOMEN:  Unremarkable. OSSEOUS STRUCTURES:  No acute fracture or destructive osseous lesion. Impression No change in the rounded atelectasis and scarring in the left lower lung posteriorly. No findings to suggest recurrence or metastasis.  Workstation performed: [id number]

## 2023-07-14 NOTE — ASSESSMENT & PLAN NOTE
Val Diaz is stable from a thoracic surgery and lung cancer standpoint. Her most recent CT scan did not reveal any evidence of recurrent or metastatic disease. We will see her back in 6 months with a new CT scan and she can be seen by one of the PA's. She is in agreement with the plan.

## 2023-07-17 ENCOUNTER — TELEPHONE (OUTPATIENT)
Dept: PULMONOLOGY | Facility: CLINIC | Age: 78
End: 2023-07-17

## 2023-07-17 NOTE — TELEPHONE ENCOUNTER
Patient called asking to speak with Dr. Gricel Owens because she wanted to talk with him about her recent CT scans. Please advise.

## 2023-07-19 ENCOUNTER — TELEPHONE (OUTPATIENT)
Dept: CARDIAC SURGERY | Facility: CLINIC | Age: 78
End: 2023-07-19

## 2023-07-19 NOTE — TELEPHONE ENCOUNTER
Pt sees Dr. Wilbert Nieves, she left me a VM stating she has some concerns about scar tissue that was seen on her CT scan. She stated the last time there was scar tissue seen it was investigated further and was found to be cancer. She would like someone to give her a call back to answer some questions she has. Pt can be reached at [phone]. Thank you.

## 2023-08-04 NOTE — TELEPHONE ENCOUNTER
Spoke to pt  Pt aware of results   Expressed verbal understanding  Will contact pcp to discuss the itching            Result Notes for EXTERNAL ORDER PANEL     Notes recorded by Shukri Hanson PA-C on 2/14/2020 at 10:11 AM EST  Please call and let the patient know liver enzymes were normal  She should see her pcp or derm for her itching   Thank you
Home

## 2023-08-11 ENCOUNTER — TELEPHONE (OUTPATIENT)
Dept: OTHER | Facility: OTHER | Age: 78
End: 2023-08-11

## 2023-08-14 NOTE — TELEPHONE ENCOUNTER
Returned call and left message for patient regarding wait list process and lack of available appointments currently. Informed her that she would be called when there was appt to offer. Requested return call with any questions regarding this. Created wait list for patient.

## 2023-09-11 ENCOUNTER — APPOINTMENT (OUTPATIENT)
Dept: LAB | Facility: HOSPITAL | Age: 78
End: 2023-09-11
Attending: INTERNAL MEDICINE
Payer: COMMERCIAL

## 2023-09-11 DIAGNOSIS — E03.8 TOXIC DIFFUSE GOITER WITH PRETIBIAL MYXEDEMA: ICD-10-CM

## 2023-09-11 DIAGNOSIS — E05.00 TOXIC DIFFUSE GOITER WITH PRETIBIAL MYXEDEMA: ICD-10-CM

## 2023-09-11 DIAGNOSIS — R73.9 BLOOD GLUCOSE ELEVATED: ICD-10-CM

## 2023-09-11 LAB
ANION GAP SERPL CALCULATED.3IONS-SCNC: 6 MMOL/L
BUN SERPL-MCNC: 20 MG/DL (ref 5–25)
CALCIUM SERPL-MCNC: 9.8 MG/DL (ref 8.4–10.2)
CHLORIDE SERPL-SCNC: 105 MMOL/L (ref 96–108)
CO2 SERPL-SCNC: 28 MMOL/L (ref 21–32)
CREAT SERPL-MCNC: 0.94 MG/DL (ref 0.6–1.3)
EST. AVERAGE GLUCOSE BLD GHB EST-MCNC: 134 MG/DL
GFR SERPL CREATININE-BSD FRML MDRD: 59 ML/MIN/1.73SQ M
GLUCOSE SERPL-MCNC: 87 MG/DL (ref 65–140)
HBA1C MFR BLD: 6.3 %
POTASSIUM SERPL-SCNC: 4.5 MMOL/L (ref 3.5–5.3)
SODIUM SERPL-SCNC: 139 MMOL/L (ref 135–147)
TSH SERPL DL<=0.05 MIU/L-ACNC: 3.22 UIU/ML (ref 0.45–4.5)

## 2023-09-11 PROCEDURE — 80048 BASIC METABOLIC PNL TOTAL CA: CPT

## 2023-09-11 PROCEDURE — 84443 ASSAY THYROID STIM HORMONE: CPT

## 2023-09-11 PROCEDURE — 36415 COLL VENOUS BLD VENIPUNCTURE: CPT

## 2023-09-11 PROCEDURE — 83036 HEMOGLOBIN GLYCOSYLATED A1C: CPT

## 2023-09-21 ENCOUNTER — TELEPHONE (OUTPATIENT)
Dept: HEMATOLOGY ONCOLOGY | Facility: CLINIC | Age: 78
End: 2023-09-21

## 2023-09-21 ENCOUNTER — APPOINTMENT (OUTPATIENT)
Dept: LAB | Facility: HOSPITAL | Age: 78
End: 2023-09-21
Payer: COMMERCIAL

## 2023-09-21 DIAGNOSIS — R19.7 DIARRHEA, UNSPECIFIED TYPE: ICD-10-CM

## 2023-09-21 LAB — C DIFF TOX GENS STL QL NAA+PROBE: NEGATIVE

## 2023-09-21 PROCEDURE — 87493 C DIFF AMPLIFIED PROBE: CPT

## 2023-09-21 NOTE — TELEPHONE ENCOUNTER
Patient Call    Who are you speaking with? Patient    If it is not the patient, are they listed on an active communication consent form? N/A   What is the reason for this call? She asked who prescribed her albuterol. I advised it was ordered by Dr. Rodríguez Click   Does this require a call back? n/a   If a call back is required, please list best call back number n/a   If a call back is required, advise that a message will be forwarded to their care team and someone will return their call as soon as possible. Did you relay this information to the patient?  N/A

## 2023-10-02 ENCOUNTER — TELEPHONE (OUTPATIENT)
Dept: CARDIOLOGY CLINIC | Facility: CLINIC | Age: 78
End: 2023-10-02

## 2023-10-02 NOTE — TELEPHONE ENCOUNTER
Pt left another v/m returning a call from our office. I called her back but did not reach her. Left v/m asking her to call back and schedule with Dr. Kassandra Leon.

## 2023-10-02 NOTE — TELEPHONE ENCOUNTER
Pt left message stating she wanted to schedule an appt with Dr. Jorge Rai. I tried returning call, phone call could not get through and didn't hear it ringing either. Please call pt and schedule.

## 2023-10-11 ENCOUNTER — OFFICE VISIT (OUTPATIENT)
Dept: GASTROENTEROLOGY | Facility: CLINIC | Age: 78
End: 2023-10-11
Payer: COMMERCIAL

## 2023-10-11 ENCOUNTER — TELEPHONE (OUTPATIENT)
Dept: GASTROENTEROLOGY | Facility: CLINIC | Age: 78
End: 2023-10-11

## 2023-10-11 VITALS
HEART RATE: 83 BPM | SYSTOLIC BLOOD PRESSURE: 114 MMHG | BODY MASS INDEX: 25.61 KG/M2 | HEIGHT: 64 IN | WEIGHT: 150 LBS | DIASTOLIC BLOOD PRESSURE: 80 MMHG

## 2023-10-11 DIAGNOSIS — K21.9 GASTROESOPHAGEAL REFLUX DISEASE WITHOUT ESOPHAGITIS: ICD-10-CM

## 2023-10-11 DIAGNOSIS — K62.89 RECTAL PAIN: ICD-10-CM

## 2023-10-11 DIAGNOSIS — R19.4 CHANGE IN BOWEL HABIT: Primary | ICD-10-CM

## 2023-10-11 DIAGNOSIS — R10.30 LOWER ABDOMINAL PAIN: ICD-10-CM

## 2023-10-11 PROCEDURE — 99214 OFFICE O/P EST MOD 30 MIN: CPT | Performed by: INTERNAL MEDICINE

## 2023-10-11 RX ORDER — ALBUTEROL SULFATE 90 UG/1
AEROSOL, METERED RESPIRATORY (INHALATION)
COMMUNITY
Start: 2023-09-24

## 2023-10-11 RX ORDER — SENNOSIDES A AND B 8.6 MG/1
1 TABLET, FILM COATED ORAL DAILY
Qty: 30 TABLET | Refills: 0 | Status: SHIPPED | OUTPATIENT
Start: 2023-10-11

## 2023-10-11 NOTE — TELEPHONE ENCOUNTER
Sent Cardiac clearance to Dr Zain Aragon.  Sb     Scheduled date of colonoscopy (as of today):12/4/23  Physician performing colonoscopy:Dr Rizzo   Location of colonoscopy:   Desired bowel prep reviewed with patient:clenpiq  Instructions reviewed with patient by:sb  Clearances:  cardio , new visit 11/16/23 Dr Zain Aragon

## 2023-10-11 NOTE — PROGRESS NOTES
[facility] - Outpatient Follow-up Note  Carline Yu 69 y.o. female MRN: [id number]  Encounter: [id number]          ASSESSMENT AND PLAN:      1. Change in bowel habit  52-year-old female now come for follow-up. She is complaining alternating constipation and diarrhea. Patient was severely constipated and then she did digital disimpaction followed by multiple loose bowel movement throughout the day. It could be spurious diarrhea or overflow diarrhea. Advised to increase the fiber in the diet, start Senokot 1 tablet p.o. nightly, she is due for colonoscopy which we will schedule it, last colonoscopy was in [facility] more than 5 years ago. Patient had a multiple question about bowel prep which was answered, she will stay on clear liquid diet day before the procedure, she will drink low volume bowel prep and split dosing  - senna (SENOKOT) 8.6 MG tablet; Take 1 tablet (8.6 mg total) by mouth daily  Dispense: 30 tablet; Refill: 0  - Colonoscopy; Future  - sodium picosulfate, magnesium oxide, citric acid (Clenpiq) oral solution; Take 175 mL (1 bottle) the evening before the colonoscopy, between 5 PM and 9 PM, followed by a second 175 mL bottle 5 hours before the colonoscopy. Dispense: 350 mL; Refill: 0    2. Rectal pain  Due to constipation and digital disimpaction, she denying any rectal bleeding, advised for high-fiber diet and start senna 1 tablet p.o. daily  - Colonoscopy; Future  - sodium picosulfate, magnesium oxide, citric acid (Clenpiq) oral solution; Take 175 mL (1 bottle) the evening before the colonoscopy, between 5 PM and 9 PM, followed by a second 175 mL bottle 5 hours before the colonoscopy. Dispense: 350 mL; Refill: 0    3. Lower abdominal pain  From chronic constipation  - Colonoscopy; Future  - sodium picosulfate, magnesium oxide, citric acid (Clenpiq) oral solution;  Take 175 mL (1 bottle) the evening before the colonoscopy, between 5 PM and 9 PM, followed by a second 175 mL bottle 5 hours before the colonoscopy. Dispense: 350 mL; Refill: 0    4. Gastroesophageal reflux disease without esophagitis  Patient had upper endoscopy with Dr. Radha De Santiago which came back normal, she did not want to take any medication, she is taking probiotic every day and follow antireflux diet    ______________________________________________________________________    SUBJECTIVE: Patient seen and examined, she come for urgent follow-up because of rectal pain, change in bowel habit with alternating constipation and diarrhea, she was apparently constipated and then she started doing digital disimpaction which lead to multiple loose bowel movement. Patient now complaining of rectal pain also, she denying any bleeding, she had a colonoscopy long time ago more than 5 years ago in [facility]. She had a recent endoscopy with Dr. Radha De Santiago which came back normal, she is s/p lobectomy for lung cancer removal, she had a multiple CAT scan, MRI which all came back normal.  She denying any nausea or vomiting, she denying any smoking or alcohol use      REVIEW OF SYSTEMS IS OTHERWISE NEGATIVE.       Historical Information   Past Medical History:   Diagnosis Date    Adenocarcinoma of lung ([address]) 07/13/2022    Arthritis     Borderline diabetes     diet controlled    Cancer (HCC)     Colon polyp     CPAP (continuous positive airway pressure) dependence     refuses to use    Depression     Disease of thyroid gland     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    DVT (deep venous thrombosis) (HCC)     hx- left leg    GERD (gastroesophageal reflux disease)     Hyperlipidemia     PONV (postoperative nausea and vomiting)     Pulmonary emboli (HCC)     during pregnancy    Rash     arms on occasion, cause unknown    Sleep apnea      Past Surgical History:   Procedure Laterality Date    COLONOSCOPY      DILATION AND CURETTAGE, DIAGNOSTIC / THERAPEUTIC      IR BIOPSY LUNG  06/15/2022     [address] INCL FLUOR GDNCE DX W/CELL WASHG SPX N/A 07/21/2022    Procedure: BRONCHOSCOPY FLEXIBLE;  Surgeon: Magy Metzger MD;  Location: [facility];  Service: Thoracic    AK THORACOSCOPY W/THERA WEDGE RESEXN INITIAL UNILAT Left 07/21/2022    Procedure: RESECTION,LUNG WEDGE THORACOSCOPIC W/ ROBOTICS, w/ mediastinal lymph node dissection;  Surgeon: Magy Metzger MD;  Location: [facility];  Service: Thoracic    THYROIDECTOMY, PARTIAL      2020    TONSILLECTOMY      age 11    UPPER GASTROINTESTINAL ENDOSCOPY       Social History   Social History     Substance and Sexual Activity   Alcohol Use Never     Social History     Substance and Sexual Activity   Drug Use Never     Social History     Tobacco Use   Smoking Status Never   Smokeless Tobacco Never     Family History   Problem Relation Age of Onset    No Known Problems Mother     Cancer Sister 80    No Known Problems Sister     Asthma Daughter     No Known Problems Maternal Grandmother     No Known Problems Maternal Grandfather     No Known Problems Paternal Grandmother     No Known Problems Paternal Grandfather     No Known Problems Son     No Known Problems Maternal Aunt     No Known Problems Maternal Aunt     No Known Problems Paternal Aunt     No Known Problems Paternal Aunt     Breast cancer Other 58    Breast cancer additional onset Neg Hx     BRCA2 Positive Neg Hx     BRCA2 Negative Neg Hx     BRCA1 Positive Neg Hx     BRCA1 Negative Neg Hx     BRCA 1/2 Neg Hx     Ovarian cancer Neg Hx     Endometrial cancer Neg Hx     Colon cancer Neg Hx        Meds/Allergies       Current Outpatient Medications:     albuterol (PROVENTIL HFA,VENTOLIN HFA) 90 mcg/act inhaler    aspirin 81 mg chewable tablet    cholecalciferol (VITAMIN D3) 400 units tablet    cyanocobalamin (VITAMIN B-12) 100 mcg tablet    Diclofenac Sodium (VOLTAREN) 1 %    levothyroxine 100 mcg tablet    levothyroxine 75 mcg tablet    loratadine (CLARITIN) 10 mg tablet    magnesium gluconate (MAGONATE) 500 MG tablet    Omega-3 Fatty Acids (fish oil) 1,000 mg    saccharomyces boulardii (FLORASTOR) 250 mg capsule    senna (SENOKOT) 8.6 MG tablet    sodium picosulfate, magnesium oxide, citric acid (Clenpiq) oral solution    vitamin E 100 UNIT capsule    gabapentin (Neurontin) 300 mg capsule    Red Yeast Rice 600 MG CAPS    Allergies   Allergen Reactions    Crab (Diagnostic) - Food Allergy Itching and Rash    Iv Contrast [Iodinated Contrast Media] Rash    Penicillins Rash           Objective     Blood pressure 114/80, pulse 83, height 5' 4" (1.626 m), weight 68 kg (150 lb), not currently breastfeeding. Body mass index is 25.75 kg/m². PHYSICAL EXAM:      General Appearance:   Alert, cooperative, no distress   HEENT:   Normocephalic, atraumatic, anicteric. Neck:  Supple, symmetrical, trachea midline   Lungs:   Clear to auscultation bilaterally; no rales, rhonchi or wheezing; respirations unlabored    Heart[de-identified]   Regular rate and rhythm; no murmur, rub, or gallop. Abdomen:   Soft, non-tender, non-distended; normal bowel sounds; no masses, no organomegaly    Genitalia:   Deferred    Rectal:   Deferred    Extremities:  No cyanosis, clubbing or edema    Pulses:  2+ and symmetric    Skin:  No jaundice, rashes, or lesions    Lymph nodes:  No palpable cervical lymphadenopathy        Lab Results:   No visits with results within 1 Day(s) from this visit. Latest known visit with results is:   Appointment on 09/21/2023   Component Date Value    C.difficile toxin by PCR 09/21/2023 Negative          Radiology Results:   No results found.

## 2023-10-13 ENCOUNTER — TELEPHONE (OUTPATIENT)
Age: 78
End: 2023-10-13

## 2023-10-13 NOTE — TELEPHONE ENCOUNTER
Patients GI provider:  Dr. Cheko Amanda     Number to return call: ( [phone]     Reason for call: Pt calling Senokot 8.6mg tabs med is not covered by Insurance pt would like to know if she can use Dulcolax instead please advised     Scheduled procedure/appointment date if applicable: Apt/procedure

## 2023-10-30 ENCOUNTER — TELEPHONE (OUTPATIENT)
Dept: GASTROENTEROLOGY | Facility: CLINIC | Age: 78
End: 2023-10-30

## 2023-10-30 NOTE — TELEPHONE ENCOUNTER
I called to reschedule her December 4th colonoscopy at [facility] she was scheduled with Dr. Gabby Avelar to another provider and patient cancelled. Not sure she is going to have. She tried fasting and can't do for more than 6 hours. I cancelled the procedure and she will think about it. I told her if she decides to have, she can schedule with one of the other providers in our group. Received call that patient was arousable only to pain. Vital signs were stable, O2 sat 96%, RR 16. On evaluation patient was only arousable to sternal rub. Order for naloxone placed. After receiving medication, patient was alert and following commands.     Tg Daigle MD  General Surgery, PGY-1

## 2023-11-15 ENCOUNTER — ANNUAL EXAM (OUTPATIENT)
Dept: OBGYN CLINIC | Facility: CLINIC | Age: 78
End: 2023-11-15
Payer: COMMERCIAL

## 2023-11-15 VITALS
BODY MASS INDEX: 25.95 KG/M2 | HEIGHT: 64 IN | WEIGHT: 152 LBS | DIASTOLIC BLOOD PRESSURE: 70 MMHG | SYSTOLIC BLOOD PRESSURE: 110 MMHG

## 2023-11-15 DIAGNOSIS — M85.80 OSTEOPENIA, UNSPECIFIED LOCATION: ICD-10-CM

## 2023-11-15 DIAGNOSIS — Z01.419 ENCOUNTER FOR ROUTINE GYNECOLOGIC EXAMINATION IN MEDICARE PATIENT: Primary | ICD-10-CM

## 2023-11-15 DIAGNOSIS — Z78.0 POSTMENOPAUSAL STATE: ICD-10-CM

## 2023-11-15 DIAGNOSIS — Z13.820 SCREENING FOR OSTEOPOROSIS: ICD-10-CM

## 2023-11-15 DIAGNOSIS — Z12.4 SCREENING FOR MALIGNANT NEOPLASM OF THE CERVIX: ICD-10-CM

## 2023-11-15 PROBLEM — N89.8 VAGINAL ITCHING: Status: RESOLVED | Noted: 2022-06-13 | Resolved: 2023-11-15

## 2023-11-15 PROCEDURE — G0101 CA SCREEN;PELVIC/BREAST EXAM: HCPCS | Performed by: PHYSICIAN ASSISTANT

## 2023-11-15 PROCEDURE — G0145 SCR C/V CYTO,THINLAYER,RESCR: HCPCS | Performed by: PHYSICIAN ASSISTANT

## 2023-11-15 NOTE — PROGRESS NOTES
ASSESSMENT & PLAN: Carline Yu is a 69 y.o. [id number] with normal gynecologic exam.    1.  Routine well woman exam done today  2. Pap and HPV:  The patient's last pap and hpv was unknown. Patient denies known history of abnormal Pap smear. Pap and cotesting was done today. Current ASCCP Guidelines reviewed. Guidelines reviewed with patient, she desires Pap smear screening today. 3.  Mammogram already scheduled for February 2024; normal mammo screening February 2023  4. Colon cancer screening up-to-date Cologuard 2022 negative. Repeat in 3 years  5. DEXA scan overdue last 2018 showing osteopenia. Osteoporosis screening discussed and accepted by patient. 6. The following were reviewed in today's visit: breast self exam, menopause, adequate intake of calcium and vitamin D, exercise, healthy diet, and age-appropriate recommendations regarding screenings and prevention. 7.  Patient will continue following with PCP and specialist for chronic conditions and adenocarcinoma. RTO 2 years for annual Medicare, sooner if problems arise in the interim. CC:  Annual Gynecologic Examination    HPI: Carline Yu is a 69 y.o. Jessica Sorayaow who presents for annual gynecologic examination. She is a new patient with our office. She has the following concerns:  none. She is under tx for adenocarcinoma of her lung. Also treated for hypothyroidism, DORIS, hearing loss. Borderline diabetes. Hx of DVT/PE. Hx of Hpylori. Hx partial thyroidectomy 2020    Healthy diet Yes; drinks plenty of water, 1 cup coffee. Vitamins Yes; mag, probiotics, vit E, Vit B, vit D  Exercise: Yes, active at home and walking    No LMP recorded. Patient is postmenopausal.    Post-menopausal bleeding: denies. Denies urinary problems, painful urination or hematuria. Occasional leakage of urine, terrell w/ coffee. Recent constipation resolved with senna; no diarrhea or blood in the stool.          Health Maintenance:      She does perform regular monthly self breast exams. Denies breast pain, lump, skin change or nipple discharge. She feels safe at home. Not in relationship currently. Last PAP & HPV: years ago; was told didn't need anymore. Last mammogram: 2023 - normal   Last colonoscopy: cologua -  - negative  Last DEXA: 2018; osteopenia    Past Medical History:   Diagnosis Date    Adenocarcinoma of lung ([address]) 2022    Arthritis     Borderline diabetes     diet controlled    Cancer (HCC)     Colon polyp     CPAP (continuous positive airway pressure) dependence     refuses to use    Depression     Disease of thyroid gland     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    DVT (deep venous thrombosis) (HCC)     hx- left leg    GERD (gastroesophageal reflux disease)     Hyperlipidemia     PONV (postoperative nausea and vomiting)     Pulmonary emboli (HCC)     during pregnancy    Rash     arms on occasion, cause unknown    Sleep apnea        Past Surgical History:   Procedure Laterality Date    COLONOSCOPY      DILATION AND CURETTAGE, DIAGNOSTIC / THERAPEUTIC      IR BIOPSY LUNG  06/15/2022     [address] INCL FLUOR GDNCE DX W/CELL WASHG SPX N/A 2022    Procedure: BRONCHOSCOPY FLEXIBLE;  Surgeon: Kusum Batres MD;  [facility];  Service: Thoracic    [place] THORACOSCOPY W/THERA WEDGE RESEXN INITIAL UNILAT Left 2022    Procedure: RESECTION,LUNG WEDGE THORACOSCOPIC W/ ROBOTICS, w/ mediastinal lymph node dissection;  Surgeon: Kusum Batres MD;  [facility];  Service: Thoracic    THYROIDECTOMY, PARTIAL      2020    TONSILLECTOMY      age 11    UPPER GASTROINTESTINAL ENDOSCOPY         Past OB/Gyn History:  OB History          2    Para   2    Term   2            AB        Living   2         SAB        IAB        Ectopic        Multiple        Live Births                   History of abnormal pap smears: No .    Patient is not currently sexually active.       Family History   Problem Relation Age of Onset No Known Problems Mother     Cancer Sister 80    No Known Problems Sister     Asthma Daughter     No Known Problems Maternal Grandmother     No Known Problems Maternal Grandfather     No Known Problems Paternal Grandmother     No Known Problems Paternal Grandfather     No Known Problems Son     No Known Problems Maternal Aunt     No Known Problems Maternal Aunt     No Known Problems Paternal Aunt     No Known Problems Paternal Aunt     Breast cancer Other 58    Breast cancer additional onset Neg Hx     BRCA2 Positive Neg Hx     BRCA2 Negative Neg Hx     BRCA1 Positive Neg Hx     BRCA1 Negative Neg Hx     BRCA 1/2 Neg Hx     Ovarian cancer Neg Hx     Endometrial cancer Neg Hx     Colon cancer Neg Hx        Family History of Breast/Uterine/Ovarian/Colon:  sister with unknown CA; niece breast cancer; denies uterine,  ovarian, colon ca    Social History:  Social History     Socioeconomic History    Marital status:       Spouse name: Not on file    Number of children: Not on file    Years of education: Not on file    Highest education level: Not on file   Occupational History    Not on file   Tobacco Use    Smoking status: Never    Smokeless tobacco: Never   Vaping Use    Vaping Use: Never used   Substance and Sexual Activity    Alcohol use: Never    Drug use: Never    Sexual activity: Yes     Partners: Male   Other Topics Concern    Not on file   Social History Narrative    Not on file     Social Determinants of Health     Financial Resource Strain: Not on file   Food Insecurity: No Food Insecurity (7/22/2022)    Hunger Vital Sign     Worried About Running Out of Food in the Last Year: Never true     Ran Out of Food in the Last Year: Never true   Transportation Needs: Not on file   Physical Activity: Not on file   Stress: Not on file   Social Connections: Not on file   Intimate Partner Violence: Not on file   Housing Stability: [address]  (7/22/2022)    Housing Stability Vital Sign     Unable to Pay for Housing in the Last Year: No     Number of Places Lived in the Last Year: 1     Unstable Housing in the Last Year: No       Allergies   Allergen Reactions    Crab (Diagnostic) - Food Allergy Itching and Rash    Iv Contrast [Iodinated Contrast Media] Rash    Penicillins Rash         Current Outpatient Medications:     albuterol (PROVENTIL HFA,VENTOLIN HFA) 90 mcg/act inhaler, INHALE 2 PUFFS BY MOUTH EVERY 4 HOURS AS DIRECTED AS NEEDED, Disp: , Rfl:     aspirin 81 mg chewable tablet, Chew 81 mg every morning, Disp: , Rfl:     cholecalciferol (VITAMIN D3) 400 units tablet, Take 400 Units by mouth every morning, Disp: , Rfl:     cyanocobalamin (VITAMIN B-12) 100 mcg tablet, Take by mouth daily Pt unsure dose  , Disp: , Rfl:     Diclofenac Sodium (VOLTAREN) 1 %, Apply 2 g topically 4 (four) times a day, Disp: 100 g, Rfl: 2    levothyroxine 100 mcg tablet, Take 100 mcg by mouth daily, Disp: , Rfl:     loratadine (CLARITIN) 10 mg tablet, Take 10 mg by mouth as needed for allergies, Disp: , Rfl:     magnesium gluconate (MAGONATE) 500 MG tablet, Take 500 mg by mouth 2 (two) times a day, Disp: , Rfl:     Omega-3 Fatty Acids (fish oil) 1,000 mg, Take 1,000 mg by mouth every morning Pt unsure dose, Disp: , Rfl:     saccharomyces boulardii (FLORASTOR) 250 mg capsule, Take 250 mg by mouth 2 (two) times a day, Disp: , Rfl:     vitamin E 100 UNIT capsule, Take 100 Units by mouth daily Pt unsure  , Disp: , Rfl:     Red Yeast Rice 600 MG CAPS, Take by mouth (Patient not taking: Reported on 7/14/2023), Disp: , Rfl:     sodium picosulfate, magnesium oxide, citric acid (Clenpiq) oral solution, Take 175 mL (1 bottle) the evening before the colonoscopy, between 5 PM and 9 PM, followed by a second 175 mL bottle 5 hours before the colonoscopy., Disp: 350 mL, Rfl: 0    Review of Systems  Constitutional :no fever, feels well, no tiredness, no recent weight gain or loss  ENT: no ear ache, no loss of hearing, no nosebleeds or nasal discharge, no sore throat or hoarseness. Cardiovascular: no complaints of slow or fast heart beat, no chest pain, no palpitations, no leg claudication or lower extremity edema. Respiratory: no complaints of shortness of shortness of breath, no CACERES  Breasts:no complaints of breast pain, breast lump, or nipple discharge  Gastrointestinal: no complaints of abdominal pain, constipation, nausea, vomiting, or diarrhea or bloody stools  Genitourinary : no complaints of dysuria, incontinence, pelvic pain, no vaginal discharge/itch/odor/dryness or PMB. Musculoskeletal: no complaints of arthralgia, no myalgia, no joint swelling or stiffness, no limb pain or swelling. Integumentary: no complaints of skin rash or lesion, itching or dry skin  Neurological: no complaints of headache, no confusion, no numbness or tingling, no dizziness or fainting  MH: denies anxiety/depression sxs    Objective      /70 (BP Location: Left arm)   Ht 5' 4" (1.626 m)   Wt 68.9 kg (152 lb)   BMI 26.09 kg/m²   General:   appears stated age, cooperative, alert normal mood and affect. BMI 26.09   Neck: normal, supple,trachea midline, no masses. Thyroid palpated normal.   Heart: regular rate and rhythm, S1, S2 normal, no murmur, click, rub or gallop   Lungs: clear to auscultation bilaterally   Breasts: normal appearance, no masses or tenderness, No nipple retraction or dimpling, No nipple discharge or bleeding, No axillary or supraclavicular adenopathy, Normal to palpation without dominant masses   Abdomen: soft, non-tender, without masses or organomegaly   Vulva: Mild generalized atrophy otherwise normal female genitalia, no lesions   Vagina: Mild atrophic change, vaginal wall prolapse; no significant erythema, visible lesion, bleeding, odor or abnormal discharge. Urethra: normal   Cervix: Normal, no discharge. PAP done. Nontender.    Uterus: normal size, contour, position, consistency, mobility, non-tender   Adnexa: no mass, fullness, tenderness Lymphatic palpation of lymph nodes in neck, axilla, groin and/or other locations: no lymphadenopathy or masses noted   Skin normal skin turgor and no rashes.    Psychiatric orientation to person, place, and time: normal. mood and affect: normal

## 2023-11-16 ENCOUNTER — OFFICE VISIT (OUTPATIENT)
Dept: CARDIOLOGY CLINIC | Facility: CLINIC | Age: 78
End: 2023-11-16
Payer: COMMERCIAL

## 2023-11-16 VITALS
HEIGHT: 64 IN | BODY MASS INDEX: 25.95 KG/M2 | HEART RATE: 79 BPM | WEIGHT: 152 LBS | SYSTOLIC BLOOD PRESSURE: 104 MMHG | TEMPERATURE: 98.2 F | DIASTOLIC BLOOD PRESSURE: 62 MMHG | OXYGEN SATURATION: 98 %

## 2023-11-16 DIAGNOSIS — I25.84 CORONARY ARTERY CALCIFICATION: ICD-10-CM

## 2023-11-16 DIAGNOSIS — E03.9 ACQUIRED HYPOTHYROIDISM: ICD-10-CM

## 2023-11-16 DIAGNOSIS — R07.9 CHEST PAIN, UNSPECIFIED TYPE: Primary | ICD-10-CM

## 2023-11-16 DIAGNOSIS — E78.2 MIXED HYPERLIPIDEMIA: ICD-10-CM

## 2023-11-16 DIAGNOSIS — I25.10 CORONARY ARTERY CALCIFICATION: ICD-10-CM

## 2023-11-16 DIAGNOSIS — Z82.49 FAMILY HISTORY OF CORONARY ARTERY DISEASE: ICD-10-CM

## 2023-11-16 PROCEDURE — 93000 ELECTROCARDIOGRAM COMPLETE: CPT | Performed by: INTERNAL MEDICINE

## 2023-11-16 PROCEDURE — 99204 OFFICE O/P NEW MOD 45 MIN: CPT | Performed by: INTERNAL MEDICINE

## 2023-11-16 RX ORDER — ROSUVASTATIN CALCIUM 5 MG/1
5 TABLET, COATED ORAL DAILY
Qty: 30 TABLET | Refills: 2 | Status: SHIPPED | OUTPATIENT
Start: 2023-11-16

## 2023-11-16 NOTE — PROGRESS NOTES
[facility] [id number] [phone]                                            Cardiology Office Consult  Neal Faust, 69 y.o. female  YOB: 1945  MRN: [id number] Encounter: [id number]      PCP - Dino Allen MD  Referring Provider - Self, Referral    No chief complaint on file. 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 Related to prior lung-wedge resection v CAD v anxiety  Plan  Check exercise stress echocardiogram to risk stratify from CAD standpoint  Monitor for high risk features of chest pain and let me know if worsening  Optimize risk factors including cholesterol    Hyperlipidemia    Counseled regarding risk of progressive coronary artery disease and stroke related to same, especially with LDL levels being high  10-year ASCVD risk is 12.2%  She wants to avoid medications, but also is concerned about risk of stroke and heart attack  I feel it is in her best interest to get on low-dose medications to lower the LDL  Start rosuvastatin 5 mg daily    Results for orders placed or performed in visit on 11/16/23   POCT ECG    Impression    Normal sinus rhythm  Poor R wave progression         Orders Placed This Encounter   Procedures   • POCT ECG       Return in about 2 months (around 1/16/2024), or if symptoms worsen or fail to improve.       History of Present Illness   69 y.o. female , retired LPN, comes in as a new patient for consultation regarding chest pain which has been ongoing for several months. She describes as a left-sided chest discomfort which occurs primarily at night, when she tries to sleep or while sitting and watching TV. No associated nausea, vomiting or diaphoresis, or any associated shortness of breath. No clear worsening with exercise or any cardio-activities. She does report being reasonably active and walks for 30 to 60 minutes on 3 days a week    Social Hx:  Never smoked, no alcohol    Family history  Father - got sick --> admitted to [place], had ?prostate cancer. No known heart problems.  in 62s  Mother -  suddenly at home - at 52 of "heart problems". No known heart procedure prior to that. Siblings: #4 of 5 siblings  Oldest sister - no known heart problems. DM2 - diet controlled  Oldest brother - CABG in late 62s  Younger sister - murmur, brain aneurysm, left side of face was sinking -->  of suicide at age 77.        Historical Information   Past Medical History:   Diagnosis Date   • Adenocarcinoma of lung ([address]) 2022   • Arthritis    • Borderline diabetes     diet controlled   • Cancer (HCC)    • Colon polyp    • CPAP (continuous positive airway pressure) dependence     refuses to use   • Depression    • Disease of thyroid gland     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   • DVT (deep venous thrombosis) (HCC)     hx- left leg   • GERD (gastroesophageal reflux disease)    • Hyperlipidemia    • PONV (postoperative nausea and vomiting)    • Pulmonary emboli (HCC)     during pregnancy   • Rash     arms on occasion, cause unknown   • Sleep apnea      Past Surgical History:   Procedure Laterality Date   • COLONOSCOPY     • DILATION AND CURETTAGE, DIAGNOSTIC / THERAPEUTIC     • IR BIOPSY LUNG  06/15/2022   •  [address] INCL FLUOR GDNCE DX W/CELL WASHG SPX N/A 2022    Procedure: BRONCHOSCOPY FLEXIBLE;  Surgeon: Lo Booker MD;  [facility];  Service: Thoracic   • [place] THORACOSCOPY W/THERA WEDGE RESEXN INITIAL UNILAT Left 07/21/2022    Procedure: RESECTION,LUNG WEDGE THORACOSCOPIC W/ ROBOTICS, w/ mediastinal lymph node dissection;  Surgeon: Chana Gutierrez MD;  [facility];  Service: Thoracic   • THYROIDECTOMY, PARTIAL      2020   • TONSILLECTOMY      age 11   • UPPER GASTROINTESTINAL ENDOSCOPY       Family History   Problem Relation Age of Onset   • No Known Problems Mother    • Cancer Sister 80   • No Known Problems Sister    • Asthma Daughter    • No Known Problems Maternal Grandmother    • No Known Problems Maternal Grandfather    • No Known Problems Paternal Grandmother    • No Known Problems Paternal Grandfather    • No Known Problems Son    • No Known Problems Maternal Aunt    • No Known Problems Maternal Aunt    • No Known Problems Paternal Aunt    • No Known Problems Paternal Aunt    • Breast cancer Other 58   • Breast cancer additional onset Neg Hx    • BRCA2 Positive Neg Hx    • BRCA2 Negative Neg Hx    • BRCA1 Positive Neg Hx    • BRCA1 Negative Neg Hx    • BRCA 1/2 Neg Hx    • Ovarian cancer Neg Hx    • Endometrial cancer Neg Hx    • Colon cancer Neg Hx      Current Outpatient Medications on File Prior to Visit   Medication Sig Dispense Refill   • albuterol (PROVENTIL HFA,VENTOLIN HFA) 90 mcg/act inhaler INHALE 2 PUFFS BY MOUTH EVERY 4 HOURS AS DIRECTED AS NEEDED     • aspirin 81 mg chewable tablet Chew 81 mg every morning     • cholecalciferol (VITAMIN D3) 400 units tablet Take 400 Units by mouth every morning     • cyanocobalamin (VITAMIN B-12) 100 mcg tablet Take by mouth daily Pt unsure dose       • Diclofenac Sodium (VOLTAREN) 1 % Apply 2 g topically 4 (four) times a day 100 g 2   • levothyroxine 100 mcg tablet Take 100 mcg by mouth daily     • loratadine (CLARITIN) 10 mg tablet Take 10 mg by mouth as needed for allergies     • magnesium gluconate (MAGONATE) 500 MG tablet Take 500 mg by mouth 2 (two) times a day     • Omega-3 Fatty Acids (fish oil) 1,000 mg Take 1,000 mg by mouth every morning Pt unsure dose     • vitamin E 100 UNIT capsule Take 100 Units by mouth daily Pt unsure       • saccharomyces boulardii (FLORASTOR) 250 mg capsule Take 250 mg by mouth 2 (two) times a day (Patient not taking: Reported on 11/16/2023)     • sodium picosulfate, magnesium oxide, citric acid (Clenpiq) oral solution Take 175 mL (1 bottle) the evening before the colonoscopy, between 5 PM and 9 PM, followed by a second 175 mL bottle 5 hours before the colonoscopy. (Patient not taking: Reported on 11/16/2023) 350 mL 0   • [DISCONTINUED] Red Yeast Rice 600 MG CAPS Take by mouth (Patient not taking: Reported on 7/14/2023)       No current facility-administered medications on file prior to visit. Allergies   Allergen Reactions   • Crab (Diagnostic) - Food Allergy Itching and Rash   • Iv Contrast [Iodinated Contrast Media] Rash   • Penicillins Rash     Social History     Socioeconomic History   • Marital status:       Spouse name: None   • Number of children: None   • Years of education: None   • Highest education level: None   Occupational History   • None   Tobacco Use   • Smoking status: Never   • Smokeless tobacco: Never   Vaping Use   • Vaping Use: Never used   Substance and Sexual Activity   • Alcohol use: Never   • Drug use: Never   • Sexual activity: Yes     Partners: Male   Other Topics Concern   • None   Social History Narrative   • None     Social Determinants of Health     Financial Resource Strain: Not on file   Food Insecurity: No Food Insecurity (7/22/2022)    Hunger Vital Sign    • Worried About Running Out of Food in the Last Year: Never true    • Ran Out of Food in the Last Year: Never true   Transportation Needs: Not on file   Physical Activity: Not on file   Stress: Not on file   Social Connections: Not on file   Intimate Partner Violence: Not on file   Housing Stability: [address]  (7/22/2022)    Housing Stability Vital Sign    • Unable to Pay for Housing in the Last Year: No    • Number of Places Lived in the Last Year: 1    • Unstable Housing in the Last Year: No        Review of Systems   All other systems reviewed and are negative. Vitals:  Vitals:    11/16/23 1345   BP: 104/62   BP Location: Left arm   Patient Position: Sitting   Cuff Size: Standard   Pulse: 79   Temp: 98.2 °F (36.8 °C)   TempSrc: Tympanic   SpO2: 98%   Weight: 68.9 kg (152 lb)   Height: 5' 4" (1.626 m)     BMI - Body mass index is 26.09 kg/m². Wt Readings from Last 7 Encounters:   11/16/23 68.9 kg (152 lb)   11/15/23 68.9 kg (152 lb)   10/11/23 68 kg (150 lb)   07/14/23 70.4 kg (155 lb 3.3 oz)   05/18/23 73.4 kg (161 lb 12.8 oz)   03/22/23 73.1 kg (161 lb 3.2 oz)   02/18/23 72.6 kg (160 lb)       Physical Exam  Vitals and nursing note reviewed. Constitutional:       General: She is not in acute distress. Appearance: Normal appearance. She is well-developed. She is not ill-appearing or diaphoretic. HENT:      Head: Normocephalic and atraumatic. Nose: No congestion. Eyes:      General: No scleral icterus. Conjunctiva/sclera: Conjunctivae normal.   Neck:      Vascular: No carotid bruit or JVD. Cardiovascular:      Rate and Rhythm: Normal rate and regular rhythm. Pulses: Normal pulses. Heart sounds: Normal heart sounds. No murmur heard. No friction rub. No gallop. Pulmonary:      Effort: Pulmonary effort is normal. No respiratory distress. Breath sounds: Normal breath sounds. No rales. Abdominal:      General: There is no distension. Palpations: Abdomen is soft. Tenderness: There is no abdominal tenderness. Musculoskeletal:         General: No swelling or tenderness. Cervical back: Neck supple. Right lower leg: No edema. Left lower leg: No edema. Skin:     General: Skin is warm. Neurological:      General: No focal deficit present. Mental Status: She is alert and oriented to person, place, and time.  Mental status is at baseline. Psychiatric:         Mood and Affect: Mood normal.         Behavior: Behavior normal.         Thought Content: Thought content normal.           Labs:  CBC:   Lab Results   Component Value Date    WBC 3.93 (L) 06/23/2023    RBC 4.96 06/23/2023    HGB 14.5 06/23/2023    HCT 45.8 06/23/2023    MCV 92 06/23/2023     06/23/2023    RDW 13.3 06/23/2023       CMP:   Lab Results   Component Value Date    K 4.5 09/11/2023     09/11/2023    CO2 28 09/11/2023    BUN 20 09/11/2023    CREATININE 0.94 09/11/2023    EGFR 59 09/11/2023    CALCIUM 9.8 09/11/2023    AST 16 06/23/2023    ALT 12 06/23/2023    ALKPHOS 60 06/23/2023       Magnesium:  Lab Results   Component Value Date    MG 2.1 07/22/2022       Lipid Profile:   Lab Results   Component Value Date    HDL 51 05/03/2023    TRIG 80 05/03/2023    LDLCALC 153 (H) 05/03/2023       Thyroid Studies:   Lab Results   Component Value Date    [id number] 3.216 09/11/2023    FREET4 1.30 12/04/2020       A1c:  No components found for: "HGA1C"    INR:  Lab Results   Component Value Date    INR 0.99 07/14/2022    INR 0.98 06/15/2022   5    Imaging: No results found. Cardiac testing:   No results found for this or any previous visit. No results found for this or any previous visit. No results found for this or any previous visit. No results found for this or any previous visit. CT chest wo contrast  Narrative: CT CHEST WITHOUT IV CONTRAST    INDICATION:   C34.92: Malignant neoplasm of unspecified part of left bronchus or lung. COMPARISON: Compared with 1/11/2023    TECHNIQUE: CT examination of the chest was performed without intravenous contrast. Multiplanar 2D reformatted images were created from the source data. This examination, like all CT scans performed in the [facility], was performed utilizing techniques to minimize radiation dose exposure, including the use of iterative reconstruction and automated exposure control. Radiation dose length   product (DLP) for this visit:  262 mGy-cm    FINDINGS:    LUNGS: Rounded atelectasis along the left lung suture posteriorly along the pleura. Adjacent subpleural scarring. Lungs are otherwise clear. There is no tracheal or endobronchial lesion. PLEURA:  Unremarkable. HEART/GREAT VESSELS: Heart is unremarkable for patient's age. No thoracic aortic aneurysm. Coronary vascular calcifications. MEDIASTINUM AND CRISTIANO:  Unremarkable. CHEST WALL AND LOWER NECK:  Unremarkable. VISUALIZED STRUCTURES IN THE UPPER ABDOMEN:  Unremarkable. OSSEOUS STRUCTURES:  No acute fracture or destructive osseous lesion. Impression: No change in the rounded atelectasis and scarring in the left lower lung posteriorly. No findings to suggest recurrence or metastasis.     Workstation performed: [id number]

## 2023-11-17 NOTE — TELEPHONE ENCOUNTER
Patient called, she is asking to speak with . She said her rib area is bothering her where she had the surgery done and was hoping to talk to him about it. I advised her I would send him a message.

## 2023-11-23 LAB
LAB AP GYN PRIMARY INTERPRETATION: NORMAL
Lab: NORMAL

## 2023-11-27 ENCOUNTER — OFFICE VISIT (OUTPATIENT)
Dept: PULMONOLOGY | Facility: CLINIC | Age: 78
End: 2023-11-27
Payer: COMMERCIAL

## 2023-11-27 VITALS
WEIGHT: 152 LBS | SYSTOLIC BLOOD PRESSURE: 108 MMHG | DIASTOLIC BLOOD PRESSURE: 70 MMHG | BODY MASS INDEX: 25.95 KG/M2 | TEMPERATURE: 98.2 F | HEIGHT: 64 IN | HEART RATE: 79 BPM | OXYGEN SATURATION: 96 %

## 2023-11-27 DIAGNOSIS — I27.82 OTHER CHRONIC PULMONARY EMBOLISM WITHOUT ACUTE COR PULMONALE (HCC): ICD-10-CM

## 2023-11-27 DIAGNOSIS — G47.33 OSA (OBSTRUCTIVE SLEEP APNEA): Primary | ICD-10-CM

## 2023-11-27 DIAGNOSIS — C34.92 ADENOCARCINOMA OF LEFT LUNG (HCC): ICD-10-CM

## 2023-11-27 PROCEDURE — 99214 OFFICE O/P EST MOD 30 MIN: CPT | Performed by: INTERNAL MEDICINE

## 2023-11-27 NOTE — PROGRESS NOTES
Progress Note - Pulmonary   Rexene Grave 69 y.o. female MRN: [id number]   Encounter: [id number]      Assessment/Plan:  Patient is a 80-year-old female with past medical history seen for adenocarcinoma who presents for evaluation of left-sided chest pain. The patient actually has 2 different types of chest pain, the first is being worked up with cardiology. The second chest pain is on the mid axillary line at the site of her surgical incisions. We discussed that these are likely incisional related pain. This was because her muscles and associated tissues were cut in that area and there it will be typically a pain associated with it. No further intervention is necessary for this. Adenocarcinoma s/p resection  -  Resection in 7/2022  -  No evidence of recurrence on follow up imaging     Obstructive sleep apnea/nocturnal hypoxia  -  Not using cpap     Dyspnea on exertion  -  Strong family history of asthma  -  Not using inhalers  -  Breathing is stable     Remote history of pulmonary embolism  -  Currently requiring only aspirin 81 mg  -  Occurred about 1990 or so  -  Last took anticoagulants in about 2011     Hypothyroidism  -  Normal TSH     1. DORIS (obstructive sleep apnea)    2. Adenocarcinoma of left lung (HCC)    3. Other chronic pulmonary embolism without acute cor pulmonale ([address])        Patient may follow up in 6 months or sooner as necessary. Orders:  No orders of the defined types were placed in this encounter. Subjective: The patient notes discomfort on her left side. She describes a discomfort on the left lateral portion of her chest by her previous surgery site. She notes no limitations to her daily activities. She is able to dance around the house to [employer]. She was started on a cholesterol medication. Inhaler Regimen:  None    Remainder of review of systems negative except as described in HPI.       The following portions of the patient's history were reviewed and updated as appropriate: allergies, current medications, past family history, past medical history, past social history, past surgical history and problem list.     Objective:   Vitals: Blood pressure 108/70, pulse 79, temperature 98.2 °F (36.8 °C), height 5' 4" (1.626 m), weight 68.9 kg (152 lb), SpO2 96 %, not currently breastfeeding., RA, Body mass index is 26.09 kg/m². Physical Exam  Gen: [place], awake, alert, oriented x 3, no acute distress  HEENT: Mucous membranes moist, no oral lesions, no thrush  NECK: No accessory muscle use, JVP not elevated  Cardiac: RRR, single S1, single S2, no murmurs, no rubs, no gallops  Lungs: CTA b/l  Abdomen: normoactive bowel sounds, soft nontender, nondistended, no rebound or rigidity, no guarding  Extremities: no cyanosis, no clubbing, no Le edema  MSK:  Strength equal in all extremities  Derm:  No rashes/lesions noted  Neuro:  Appropriate mood/affect    Labs: I have personally reviewed pertinent lab results. Lab Results   Component Value Date    WBC 3.93 (L) 06/23/2023    HGB 14.5 06/23/2023     06/23/2023     Lab Results   Component Value Date    CREATININE 0.94 09/11/2023        Imaging and other studies: I have personally reviewed pertinent reports. and I have personally reviewed pertinent films in PACS  CT Chest 7/7/2023  My interpretation:  + emphysema    Radiology findings:  LUNGS: Rounded atelectasis along the left lung suture posteriorly along the pleura. Adjacent subpleural scarring. Lungs are otherwise clear. There is no tracheal or endobronchial lesion. PLEURA:  Unremarkable. HEART/GREAT VESSELS: Heart is unremarkable for patient's age. No thoracic aortic aneurysm. Coronary vascular calcifications. MEDIASTINUM AND CRISTIANO:  Unremarkable. CHEST WALL AND LOWER NECK:  Unremarkable. VISUALIZED STRUCTURES IN THE UPPER ABDOMEN:  Unremarkable. OSSEOUS STRUCTURES:  No acute fracture or destructive osseous lesion.     Pulmonary Function Testing: I have personally reviewed pertinent reports. and I have personally reviewed pertinent films in PACS  6/30/2022:  FEV1/FVC Ratio:  1.53 % (56 % predicted)  Forced Vital Capacity:  0.96 L   46 % predicted  FEV1:  0.96 L    46 % predicted  After administration of bronchodilator FEV1: 0.93 (-2%)  Lung volumes by body plethysmography: Total Lung Capacity 72 % predicted Residual volume 96 % predicted  RV/TLC ratio 132 %  DLCO corrected for patients hemoglobin level:  50 %    Link VELÁSQUEZ  [address]

## 2023-12-05 ENCOUNTER — TELEPHONE (OUTPATIENT)
Dept: CARDIOLOGY CLINIC | Facility: CLINIC | Age: 78
End: 2023-12-05

## 2023-12-05 NOTE — TELEPHONE ENCOUNTER
Patient called to see if she has to hold any of her medications before her stress test tomorrow.   Please Advise Thank you

## 2023-12-05 NOTE — TELEPHONE ENCOUNTER
Reviewed pre stress test instructions and also no hold on any medications patient understood instructions.

## 2023-12-06 ENCOUNTER — HOSPITAL ENCOUNTER (OUTPATIENT)
Dept: NON INVASIVE DIAGNOSTICS | Facility: HOSPITAL | Age: 78
Discharge: HOME | End: 2023-12-06
Attending: INTERNAL MEDICINE
Payer: COMMERCIAL

## 2023-12-06 VITALS — HEIGHT: 64 IN | BODY MASS INDEX: 25.95 KG/M2 | WEIGHT: 152 LBS

## 2023-12-06 DIAGNOSIS — I25.10 CORONARY ARTERY CALCIFICATION: ICD-10-CM

## 2023-12-06 DIAGNOSIS — Z82.49 FAMILY HISTORY OF CORONARY ARTERY DISEASE: ICD-10-CM

## 2023-12-06 DIAGNOSIS — R07.9 CHEST PAIN, UNSPECIFIED TYPE: ICD-10-CM

## 2023-12-06 DIAGNOSIS — I25.84 CORONARY ARTERY CALCIFICATION: ICD-10-CM

## 2023-12-06 LAB
CHEST PAIN STATEMENT: NORMAL
LAAS-AP4: 15.7 CM2
MAX DIASTOLIC BP: 80 MMHG
MAX HR PERCENT: 86 %
MAX HR: 123 BPM
MAX PREDICTED HEART RATE: 143 BPM
PROTOCOL NAME: NORMAL
RATE PRESSURE PRODUCT: NORMAL
REASON FOR TERMINATION: NORMAL
RIGHT ATRIAL 2D VOLUME: 29 ML
RIGHT ATRIUM AREA SYSTOLE A4C: 12.5 CM2
SL CV LV EF: 60
SL CV STRESS RECOVERY BP: NORMAL MMHG
SL CV STRESS RECOVERY HR: 90 BPM
SL CV STRESS RECOVERY O2 SAT: 98 %
SL CV STRESS STAGE REACHED: 2
STRESS ANGINA INDEX: 0
STRESS BASELINE BP: NORMAL MMHG
STRESS BASELINE HR: 86 BPM
STRESS O2 SAT REST: 93 %
STRESS PEAK HR: 123 BPM
STRESS POST ESTIMATED WORKLOAD: 7 METS
STRESS POST EXERCISE DUR MIN: 6 MIN
STRESS POST EXERCISE DUR MIN: 6 MIN
STRESS POST EXERCISE DUR SEC: 0 SEC
STRESS POST EXERCISE DUR SEC: 0 SEC
STRESS POST O2 SAT PEAK: 98 %
STRESS POST PEAK BP: 146 MMHG
STRESS POST PEAK HR: 123 BPM
STRESS POST PEAK SYSTOLIC BP: 146 MMHG
TARGET HR FORMULA: NORMAL
TEST INDICATION: NORMAL

## 2023-12-06 PROCEDURE — 93350 STRESS TTE ONLY: CPT | Performed by: INTERNAL MEDICINE

## 2023-12-06 PROCEDURE — 93350 STRESS TTE ONLY: CPT

## 2023-12-07 ENCOUNTER — TELEPHONE (OUTPATIENT)
Dept: CARDIOLOGY CLINIC | Facility: CLINIC | Age: 78
End: 2023-12-07

## 2023-12-07 NOTE — TELEPHONE ENCOUNTER
Patient called in to discuss Stress Test results. Office read patient provider's note. Patient would like to speak directly with provider to discuss what other diagnostic testing can be done.  [phone]

## 2023-12-08 NOTE — TELEPHONE ENCOUNTER
Patient called back into office and office advised of the following;    Steffanie Parker MD        A lot of her symptoms are likely related to prior lung resection and scarring related to same. With the stress test being normal, there is not much else we need to do at present in terms of testing. If she were to continue to have more exertional chest pain/pressure, then we can discuss it further at her follow-up visit and consider a cardiac CT angiogram.  But will need to see in the office to discuss that and see if it is appropriate for her. In the meantime, she needs to mainly work on controlling her cholesterol and blood pressure.

## 2023-12-11 DIAGNOSIS — E78.2 MIXED HYPERLIPIDEMIA: ICD-10-CM

## 2023-12-11 RX ORDER — ROSUVASTATIN CALCIUM 5 MG/1
5 TABLET, COATED ORAL DAILY
Qty: 90 TABLET | Refills: 3 | Status: SHIPPED | OUTPATIENT
Start: 2023-12-11

## 2023-12-20 ENCOUNTER — APPOINTMENT (OUTPATIENT)
Dept: LAB | Facility: HOSPITAL | Age: 78
End: 2023-12-20
Attending: INTERNAL MEDICINE
Payer: COMMERCIAL

## 2023-12-20 DIAGNOSIS — C34.92 SQUAMOUS CARCINOMA OF LUNG, LEFT (HCC): ICD-10-CM

## 2023-12-20 DIAGNOSIS — E55.9 AVITAMINOSIS D: ICD-10-CM

## 2023-12-20 DIAGNOSIS — E05.00 TOXIC DIFFUSE GOITER WITH PRETIBIAL MYXEDEMA: ICD-10-CM

## 2023-12-20 DIAGNOSIS — E03.8 TOXIC DIFFUSE GOITER WITH PRETIBIAL MYXEDEMA: ICD-10-CM

## 2023-12-20 DIAGNOSIS — E78.49 FAMILIAL COMBINED HYPERLIPIDEMIA: ICD-10-CM

## 2023-12-20 LAB
ALBUMIN SERPL BCP-MCNC: 4.5 G/DL (ref 3.5–5)
ALP SERPL-CCNC: 49 U/L (ref 34–104)
ALT SERPL W P-5'-P-CCNC: 17 U/L (ref 7–52)
ANION GAP SERPL CALCULATED.3IONS-SCNC: 6 MMOL/L
AST SERPL W P-5'-P-CCNC: 20 U/L (ref 13–39)
BASOPHILS # BLD AUTO: 0.05 THOUSANDS/ÂΜL (ref 0–0.1)
BASOPHILS NFR BLD AUTO: 1 % (ref 0–1)
BILIRUB SERPL-MCNC: 0.77 MG/DL (ref 0.2–1)
BUN SERPL-MCNC: 16 MG/DL (ref 5–25)
CALCIUM SERPL-MCNC: 9.9 MG/DL (ref 8.4–10.2)
CHLORIDE SERPL-SCNC: 106 MMOL/L (ref 96–108)
CHOLEST SERPL-MCNC: 142 MG/DL
CO2 SERPL-SCNC: 29 MMOL/L (ref 21–32)
CREAT SERPL-MCNC: 0.87 MG/DL (ref 0.6–1.3)
EOSINOPHIL # BLD AUTO: 0.23 THOUSAND/ÂΜL (ref 0–0.61)
EOSINOPHIL NFR BLD AUTO: 5 % (ref 0–6)
ERYTHROCYTE [DISTWIDTH] IN BLOOD BY AUTOMATED COUNT: 12.9 % (ref 11.6–15.1)
GFR SERPL CREATININE-BSD FRML MDRD: 64 ML/MIN/1.73SQ M
GLUCOSE P FAST SERPL-MCNC: 93 MG/DL (ref 65–99)
HCT VFR BLD AUTO: 47.3 % (ref 34.8–46.1)
HDLC SERPL-MCNC: 52 MG/DL
HGB BLD-MCNC: 14.9 G/DL (ref 11.5–15.4)
IMM GRANULOCYTES # BLD AUTO: 0 THOUSAND/UL (ref 0–0.2)
IMM GRANULOCYTES NFR BLD AUTO: 0 % (ref 0–2)
LDLC SERPL CALC-MCNC: 76 MG/DL (ref 0–100)
LYMPHOCYTES # BLD AUTO: 2.39 THOUSANDS/ÂΜL (ref 0.6–4.47)
LYMPHOCYTES NFR BLD AUTO: 52 % (ref 14–44)
MCH RBC QN AUTO: 29.6 PG (ref 26.8–34.3)
MCHC RBC AUTO-ENTMCNC: 31.5 G/DL (ref 31.4–37.4)
MCV RBC AUTO: 94 FL (ref 82–98)
MONOCYTES # BLD AUTO: 0.32 THOUSAND/ÂΜL (ref 0.17–1.22)
MONOCYTES NFR BLD AUTO: 7 % (ref 4–12)
NEUTROPHILS # BLD AUTO: 1.59 THOUSANDS/ÂΜL (ref 1.85–7.62)
NEUTS SEG NFR BLD AUTO: 35 % (ref 43–75)
NONHDLC SERPL-MCNC: 90 MG/DL
NRBC BLD AUTO-RTO: 0 /100 WBCS
PLATELET # BLD AUTO: 199 THOUSANDS/UL (ref 149–390)
PMV BLD AUTO: 10.1 FL (ref 8.9–12.7)
POTASSIUM SERPL-SCNC: 4.4 MMOL/L (ref 3.5–5.3)
PROT SERPL-MCNC: 8.1 G/DL (ref 6.4–8.4)
RBC # BLD AUTO: 5.03 MILLION/UL (ref 3.81–5.12)
SODIUM SERPL-SCNC: 141 MMOL/L (ref 135–147)
TRIGL SERPL-MCNC: 68 MG/DL
TSH SERPL DL<=0.05 MIU/L-ACNC: 1.03 UIU/ML (ref 0.45–4.5)
WBC # BLD AUTO: 4.58 THOUSAND/UL (ref 4.31–10.16)

## 2023-12-20 PROCEDURE — 85025 COMPLETE CBC W/AUTO DIFF WBC: CPT

## 2023-12-20 PROCEDURE — 80053 COMPREHEN METABOLIC PANEL: CPT

## 2023-12-20 PROCEDURE — 84443 ASSAY THYROID STIM HORMONE: CPT

## 2023-12-20 PROCEDURE — 80061 LIPID PANEL: CPT

## 2023-12-20 PROCEDURE — 36415 COLL VENOUS BLD VENIPUNCTURE: CPT

## 2023-12-29 ENCOUNTER — TELEPHONE (OUTPATIENT)
Dept: CARDIOLOGY CLINIC | Facility: CLINIC | Age: 78
End: 2023-12-29

## 2024-01-02 ENCOUNTER — HOSPITAL ENCOUNTER (OUTPATIENT)
Dept: CT IMAGING | Facility: HOSPITAL | Age: 79
Discharge: HOME | End: 2024-01-02
Payer: COMMERCIAL

## 2024-01-02 ENCOUNTER — APPOINTMENT (OUTPATIENT)
Dept: RADIOLOGY | Facility: HOSPITAL | Age: 79
End: 2024-01-02
Payer: COMMERCIAL

## 2024-01-02 DIAGNOSIS — C34.92 ADENOCARCINOMA OF LEFT LUNG (HCC): ICD-10-CM

## 2024-01-02 PROCEDURE — 71250 CT THORAX DX C-: CPT

## 2024-01-02 PROCEDURE — G1004 CDSM NDSC: HCPCS

## 2024-01-03 ENCOUNTER — TELEPHONE (OUTPATIENT)
Dept: CARDIOLOGY CLINIC | Facility: CLINIC | Age: 79
End: 2024-01-03

## 2024-01-09 ENCOUNTER — TELEPHONE (OUTPATIENT)
Age: 79
End: 2024-01-09

## 2024-01-09 NOTE — TELEPHONE ENCOUNTER
Pt called office and states she would like to speak to  in regards to the CPAP machine. She was wondering who was the order placed under. Informed her it was Dr.Alan Parr. Pt gives thanks and is requesting a call back.

## 2024-01-10 ENCOUNTER — TELEPHONE (OUTPATIENT)
Dept: SLEEP CENTER | Facility: CLINIC | Age: 79
End: 2024-01-10

## 2024-01-15 ENCOUNTER — OFFICE VISIT (OUTPATIENT)
Dept: CARDIAC SURGERY | Facility: CLINIC | Age: 79
End: 2024-01-15
Payer: COMMERCIAL

## 2024-01-15 ENCOUNTER — TELEPHONE (OUTPATIENT)
Dept: HEMATOLOGY ONCOLOGY | Facility: CLINIC | Age: 79
End: 2024-01-15

## 2024-01-15 VITALS
SYSTOLIC BLOOD PRESSURE: 110 MMHG | OXYGEN SATURATION: 97 % | TEMPERATURE: 97.3 F | DIASTOLIC BLOOD PRESSURE: 72 MMHG | HEART RATE: 85 BPM | HEIGHT: 64 IN | BODY MASS INDEX: 25.63 KG/M2 | WEIGHT: 150.13 LBS | RESPIRATION RATE: 18 BRPM

## 2024-01-15 DIAGNOSIS — C34.92 ADENOCARCINOMA OF LEFT LUNG (HCC): Primary | ICD-10-CM

## 2024-01-15 PROCEDURE — 99213 OFFICE O/P EST LOW 20 MIN: CPT | Performed by: PHYSICIAN ASSISTANT

## 2024-01-15 NOTE — TELEPHONE ENCOUNTER
Appointment Confirmation   Who are you speaking with? Patient   If it is not the patient, are they listed on an active communication consent form? N/A   Which provider is the appointment scheduled with?  Lela Berman PA-C   When is the appointment scheduled?  Please list date and time 01/15/2024 @ 2:30PM    At which location is the appointment scheduled to take place? [place]   Did caller verbalize understanding of appointment details? Yes

## 2024-01-15 NOTE — PROGRESS NOTES
Thoracic Follow-Up  Assessment/Plan:    Adenocarcinoma of lung (HCC)  Ms Knowles is a year and a half out from a robotic-assisted left lower lobe wedge resection and MLND for Stage IA2 adenocarcinoma. Most recent CT chest shows a new 2mm nodule in the right lower lobe along the major fissure. This is very small and thus we will continue to monitor with repeat CT chest in 6 months. There is no way to evaluate this with PET nor biopsy given it's small size. We will see her back in 6 months with repeat CT chest for continued monitoring. All questions were answered and she is in agreement with this plan .       Diagnoses and all orders for this visit:    Adenocarcinoma of left lung (HCC)  -     CT chest wo contrast; Future          Thoracic History      Cancer Staging   Adenocarcinoma of lung (HCC)  Staging form: Lung, AJCC 8th Edition  - Clinical stage from 7/21/2022: Stage IA2 (cT1b, cN0, cM0) - Signed by Elyssa Wylie PA-C on 8/10/2022  Histopathologic type: Adenocarcinoma, NOS  Histologic grade (G): G1  Histologic grading system: 4 grade system  Laterality: Left  Tumor size (mm): 18  Lymph-vascular invasion (LVI): LVI not present (absent)/not identified  Specimen type: Excision  Oncology History   Adenocarcinoma of lung (HCC)   7/13/2022 Initial Diagnosis    Adenocarcinoma of lung (HCC)     7/21/2022 -  Cancer Staged    Staging form: Lung, AJCC 8th Edition  - Clinical stage from 7/21/2022: Stage IA2 (cT1b, cN0, cM0) - Signed by Elyssa Wylie PA-C on 8/10/2022  Histopathologic type: Adenocarcinoma, NOS  Histologic grade (G): G1  Histologic grading system: 4 grade system  Laterality: Left  Tumor size (mm): 18  Lymph-vascular invasion (LVI): LVI not present (absent)/not identified  Specimen type: Excision              Subjective:    Patient ID: Geneva Knowles is a 78 y.o. female.    HPI  Ms Knowles is a 78 year old female with PMH Stage IA adenocarcinoma s/p robotic assisted left lower lobe wedge resection and MLND on  7/21/2022 who presents for routine surveillance.     CT chest on 1/2/24 was personally reviewed by myself in PACS and shows stable postsurgical change, a new 2mm nodule in the right lower lobe along the major fissure.     On discussion she is feeling very well. Denies SOB, cough, fevers/chills, recent illness, hemoptysis or unintentional weightloss.     The following portions of the patient's history were reviewed and updated as appropriate: allergies, current medications, past family history, past medical history, past social history, past surgical history, and problem list.    Review of Systems   Constitutional:  Negative for chills, diaphoresis and unexpected weight change.   HENT: Negative.     Eyes: Negative.    Respiratory:  Negative for cough, shortness of breath and wheezing.    Cardiovascular:  Negative for chest pain and leg swelling.   Gastrointestinal:  Negative for abdominal pain, diarrhea and nausea.   Endocrine: Negative.    Genitourinary: Negative.    Musculoskeletal: Negative.    Skin: Negative.    Allergic/Immunologic: Negative.    Neurological: Negative.    Hematological:  Negative for adenopathy. Does not bruise/bleed easily.   Psychiatric/Behavioral: Negative.     All other systems reviewed and are negative.        Objective:   Physical Exam  Vitals reviewed.   Constitutional:       General: She is not in acute distress.     Appearance: Normal appearance. She is not ill-appearing.   HENT:      Head: Normocephalic.      Nose: Nose normal.      Mouth/Throat:      Mouth: Mucous membranes are moist.   Eyes:      Pupils: Pupils are equal, round, and reactive to light.   Cardiovascular:      Rate and Rhythm: Normal rate and regular rhythm.      Heart sounds: Normal heart sounds.   Pulmonary:      Effort: Pulmonary effort is normal.      Breath sounds: Normal breath sounds. No wheezing, rhonchi or rales.   Abdominal:      Palpations: Abdomen is soft.   Musculoskeletal:         General: No swelling.  "Normal range of motion.   Lymphadenopathy:      Cervical: No cervical adenopathy.   Skin:     General: Skin is warm.   Neurological:      General: No focal deficit present.      Mental Status: She is alert and oriented to person, place, and time.   Psychiatric:         Mood and Affect: Mood normal.     /72   Pulse 85   Temp (!) 97.3 °F (36.3 °C)   Resp 18   Ht 5' 4\" (1.626 m)   Wt 68.1 kg (150 lb 2.1 oz)   SpO2 97%   BMI 25.77 kg/m²     No Chest XR results available for this patient.   CT chest wo contrast    Result Date: 1/10/2024  Narrative CT CHEST WITHOUT IV CONTRAST INDICATION:   C34.92: Malignant neoplasm of unspecified part of left bronchus or lung. COMPARISON: Multiple CTs of the chest from January 23, 2022 to July 7, 2023 TECHNIQUE: CT examination of the chest was performed without intravenous contrast. Multiplanar 2D reformatted images were created from the source data. This examination, like all CT scans performed in the [facility], was performed utilizing techniques to minimize radiation dose exposure, including the use of iterative reconstruction and automated exposure control. Radiation dose length product (DLP) for this visit:  192.9 mGy-cm FINDINGS: LUNGS: Stable area of postsurgical change, scarring and atelectasis in the left lower lobe. There is a 2 mm nodule along the major fissure in the right lower lobe on image 206/73 which was not previously seen. Stable mild biapical scarring. Central airways are clear. PLEURA:  Unremarkable. HEART/GREAT VESSELS: Heart size normal. Coronary artery calcification. No pericardial effusion. No thoracic aortic aneurysm. MEDIASTINUM AND CRISTIANO:  Unremarkable. CHEST WALL AND LOWER NECK:  Unremarkable. VISUALIZED STRUCTURES IN THE UPPER ABDOMEN:  Unremarkable. OSSEOUS STRUCTURES:  No acute fracture or destructive osseous lesion.     Impression There is a new 2 mm right lower lobe nodule along the major fissure. This is of low suspicion, " but suggest attention on 12-month follow-up. Stable chronic scarring and atelectasis in the left lower lobe. Workstation performed: [id number]     CT chest wo contrast    Result Date: 7/11/2023  Narrative CT CHEST WITHOUT IV CONTRAST INDICATION:   C34.92: Malignant neoplasm of unspecified part of left bronchus or lung. COMPARISON: Compared with 1/11/2023 TECHNIQUE: CT examination of the chest was performed without intravenous contrast. Multiplanar 2D reformatted images were created from the source data. This examination, like all CT scans performed in the [facility], was performed utilizing techniques to minimize radiation dose exposure, including the use of iterative reconstruction and automated exposure control. Radiation dose length product (DLP) for this visit:  262 mGy-cm FINDINGS: LUNGS: Rounded atelectasis along the left lung suture posteriorly along the pleura. Adjacent subpleural scarring. Lungs are otherwise clear.  There is no tracheal or endobronchial lesion. PLEURA:  Unremarkable. HEART/GREAT VESSELS: Heart is unremarkable for patient's age.  No thoracic aortic aneurysm. Coronary vascular calcifications. MEDIASTINUM AND CRISTIANO:  Unremarkable. CHEST WALL AND LOWER NECK:  Unremarkable. VISUALIZED STRUCTURES IN THE UPPER ABDOMEN:  Unremarkable. OSSEOUS STRUCTURES:  No acute fracture or destructive osseous lesion.     Impression No change in the rounded atelectasis and scarring in the left lower lung posteriorly. No findings to suggest recurrence or metastasis. Workstation performed: [id number]     No CT Chest,Abdomen,Pelvis results available for this patient.   No NM PET CT results available for this patient.   No Barium Swallow results available for this patient.

## 2024-01-15 NOTE — ASSESSMENT & PLAN NOTE
Ms Knowles is a year and a half out from a robotic-assisted left lower lobe wedge resection and MLND for Stage IA2 adenocarcinoma. Most recent CT chest shows a new 2mm nodule in the right lower lobe along the major fissure. This is very small and thus we will continue to monitor with repeat CT chest in 6 months. There is no way to evaluate this with PET nor biopsy given it's small size. We will see her back in 6 months with repeat CT chest for continued monitoring. All questions were answered and she is in agreement with this plan .

## 2024-01-17 ENCOUNTER — TELEPHONE (OUTPATIENT)
Age: 79
End: 2024-01-17

## 2024-01-17 NOTE — TELEPHONE ENCOUNTER
"Pt calling regarding CT Chest results.  She read the results and states \"saw something there on the right side\".  Pt states hx of L Lung CA.    Please call [phone] as soon as possible.  "

## 2024-01-17 NOTE — TELEPHONE ENCOUNTER
Spoke with patient. She did have visit with thoracic surgery on 1/15 and has a 6 month follow up scheduled. I reassured her that the nodule at this time is not suspicious and it is safe to wait 6 months for a repeat CT scan

## 2024-01-30 ENCOUNTER — OFFICE VISIT (OUTPATIENT)
Dept: CARDIOLOGY CLINIC | Facility: CLINIC | Age: 79
End: 2024-01-30
Payer: COMMERCIAL

## 2024-01-30 VITALS
WEIGHT: 150 LBS | DIASTOLIC BLOOD PRESSURE: 70 MMHG | HEART RATE: 84 BPM | SYSTOLIC BLOOD PRESSURE: 110 MMHG | BODY MASS INDEX: 25.61 KG/M2 | HEIGHT: 64 IN | OXYGEN SATURATION: 95 %

## 2024-01-30 DIAGNOSIS — Z82.49 FAMILY HISTORY OF CORONARY ARTERY DISEASE: ICD-10-CM

## 2024-01-30 DIAGNOSIS — I25.10 CORONARY ARTERY CALCIFICATION: ICD-10-CM

## 2024-01-30 DIAGNOSIS — E03.9 ACQUIRED HYPOTHYROIDISM: ICD-10-CM

## 2024-01-30 DIAGNOSIS — E78.2 MIXED HYPERLIPIDEMIA: Primary | ICD-10-CM

## 2024-01-30 DIAGNOSIS — I25.84 CORONARY ARTERY CALCIFICATION: ICD-10-CM

## 2024-01-30 PROCEDURE — 99213 OFFICE O/P EST LOW 20 MIN: CPT | Performed by: INTERNAL MEDICINE

## 2024-01-30 RX ORDER — TRIAMCINOLONE ACETONIDE 0.25 MG/G
CREAM TOPICAL
COMMUNITY
Start: 2024-01-03

## 2024-01-30 NOTE — PROGRESS NOTES
[facility] [place]   [address] [id number] [phone]                                            Cardiology Office Consult  Geneva Knowles, 78 y.o. female  YOB: 1945  MRN: [id number] Encounter: [id number]      PCP - Melissa Schreiber MD  Referring Provider - No ref. provider found    No chief complaint on file.      Assessment  Chest discomfort  Coronary artery calcification  Hyperlipidemia  S/p left lower lobe lung wedge resection (7/2022)  For adenocarcinoma 1.9 cm - biopsy proven  S/p partial thyroidectomy    Plan  Chest discomfort, coronary artery calcification, family h/o CAD  Overview  11/2023: Atypical chest discomfort, mostly at night, non-exertional  ECG - poor R wave progression / c/r/o old anterior infarct  12/6/23: Stress echo:   6 min, 7 METS, stress ECG/echo without any evidence of ischemia  1/2024: chest pain resolved.  Impression  ?was related to prior lung-wedge resection v anxiety, and now resolved  Plan  Continue to monitor for high risk features of chest pain/ACS and let me know if any new or recurrent symptoms  Optimize risk factors   Continue rosuvastatin 5 mg daily    Hyperlipidemia, Coronary artery calcification    10-year ASCVD risk is 12.2%  She wanted to avoid medications, but was also concerned about risk of stroke and heart attack & as a result agreed to try rosuvastatin  No apparent problems with rosuvastatin so far and cholesterol levels are much improved  Continue rosuvastatin 5 mg daily    No results found for this visit on 01/30/24.      No orders of the defined types were placed in this encounter.      Return in about 1 year (around 1/30/2025), or if symptoms worsen or fail to improve.      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    She describes as a left-sided chest discomfort which occurs primarily at night,  "when she tries to sleep or while sitting and watching TV.  No associated nausea, vomiting or diaphoresis, or any associated shortness of breath.  No clear worsening with exercise or any cardio-activities.    She does report being reasonably active and walks for 30 to 60 minutes on 3 days a week    Social Hx:  Never smoked, no alcohol    Family history  Father - got sick --> [facility], had ?prostate cancer. No known heart problems.  in 60s  Mother -  suddenly at home - at 49 of \"heart problems\". No known heart procedure prior to that.   Siblings: #4 of 5 siblings  Oldest sister - no known heart problems. DM2 - diet controlled  Oldest brother - CABG in late 60s  Younger sister - murmur, brain aneurysm, left side of face was sinking -->  of suicide at age 66.     Interval history - 2024  She returns for follow-up after completing stress testing.  In the interim, her chest pain is now resolved and she is feeling a lot better.  She has been taking rosuvastatin and is doing a lot better with her cholesterol levels since then.  She does report issues with a rash on her hand and both sides of her abdomen and is awaiting dermatology follow-up for this.        Historical Information   Past Medical History:   Diagnosis Date   • Adenocarcinoma of lung (HCC) 2022   • Arthritis    • Borderline diabetes     diet controlled   • Cancer (HCC)    • Colon polyp    • CPAP (continuous positive airway pressure) dependence     refuses to use   • Depression    • Disease of thyroid gland     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   • DVT (deep venous thrombosis) (HCC)     hx- left leg   • GERD (gastroesophageal reflux disease)    • Hyperlipidemia    • PONV (postoperative nausea and vomiting)    • Pulmonary emboli (HCC)     during pregnancy   • Rash     arms on occasion, cause unknown   • Sleep apnea      Past Surgical History:   Procedure Laterality Date   • COLONOSCOPY     • DILATION AND CURETTAGE, DIAGNOSTIC / " THERAPEUTIC     • IR BIOPSY LUNG  06/15/2022   • KY BRNCHSC INCL FLUOR GDNCE DX W/CELL WASHG SPX N/A 07/21/2022    Procedure: BRONCHOSCOPY FLEXIBLE;  Surgeon: Raulito Bliss MD;  Location: [facility];  Service: Thoracic   • KY THORACOSCOPY W/THERA WEDGE RESEXN INITIAL UNILAT Left 07/21/2022    Procedure: RESECTION,LUNG WEDGE THORACOSCOPIC W/ ROBOTICS, w/ mediastinal lymph node dissection;  Surgeon: Raulito Bliss MD;  Location: [facility];  Service: Thoracic   • THYROIDECTOMY, PARTIAL      2020   • TONSILLECTOMY      age 7   • UPPER GASTROINTESTINAL ENDOSCOPY       Family History   Problem Relation Age of Onset   • No Known Problems Mother    • Cancer Sister 80   • No Known Problems Sister    • Asthma Daughter    • No Known Problems Maternal Grandmother    • No Known Problems Maternal Grandfather    • No Known Problems Paternal Grandmother    • No Known Problems Paternal Grandfather    • No Known Problems Son    • No Known Problems Maternal Aunt    • No Known Problems Maternal Aunt    • No Known Problems Paternal Aunt    • No Known Problems Paternal Aunt    • Breast cancer Other 62   • Breast cancer additional onset Neg Hx    • BRCA2 Positive Neg Hx    • BRCA2 Negative Neg Hx    • BRCA1 Positive Neg Hx    • BRCA1 Negative Neg Hx    • BRCA 1/2 Neg Hx    • Ovarian cancer Neg Hx    • Endometrial cancer Neg Hx    • Colon cancer Neg Hx      Current Outpatient Medications on File Prior to Visit   Medication Sig Dispense Refill   • albuterol (PROVENTIL HFA,VENTOLIN HFA) 90 mcg/act inhaler INHALE 2 PUFFS BY MOUTH EVERY 4 HOURS AS DIRECTED AS NEEDED     • aspirin 81 mg chewable tablet Chew 81 mg every morning     • cholecalciferol (VITAMIN D3) 400 units tablet Take 400 Units by mouth every morning     • cyanocobalamin (VITAMIN B-12) 100 mcg tablet Take by mouth daily Pt unsure dose       • Diclofenac Sodium (VOLTAREN) 1 % Apply 2 g topically 4 (four) times a day 100 g 2   • levothyroxine 100 mcg tablet Take 100 mcg  by mouth daily     • loratadine (CLARITIN) 10 mg tablet Take 10 mg by mouth as needed for allergies     • magnesium gluconate (MAGONATE) 500 MG tablet Take 500 mg by mouth 2 (two) times a day     • Omega-3 Fatty Acids (fish oil) 1,000 mg Take 1,000 mg by mouth every morning Pt unsure dose     • rosuvastatin (CRESTOR) 5 mg tablet TAKE 1 TABLET(5 MG) BY MOUTH DAILY 90 tablet 3   • saccharomyces boulardii (FLORASTOR) 250 mg capsule Take 250 mg by mouth 2 (two) times a day     • triamcinolone (KENALOG) 0.025 % cream APPLY TOPICALLY TO THE AFFECTED AREA TWICE DAILY FOR 1 WEEK     • vitamin E 100 UNIT capsule Take 100 Units by mouth daily Pt unsure       • sodium picosulfate, magnesium oxide, citric acid (Clenpiq) oral solution Take 175 mL (1 bottle) the evening before the colonoscopy, between 5 PM and 9 PM, followed by a second 175 mL bottle 5 hours before the colonoscopy. (Patient not taking: Reported on 11/16/2023) 350 mL 0     No current facility-administered medications on file prior to visit.     Allergies   Allergen Reactions   • Crab (Diagnostic) - Food Allergy Itching and Rash   • Iv Contrast [Iodinated Contrast Media] Rash   • Penicillins Rash     Social History     Socioeconomic History   • Marital status:      Spouse name: None   • Number of children: None   • Years of education: None   • Highest education level: None   Occupational History   • None   Tobacco Use   • Smoking status: Never   • Smokeless tobacco: Never   Vaping Use   • Vaping status: Never Used   Substance and Sexual Activity   • Alcohol use: Never   • Drug use: Never   • Sexual activity: Yes     Partners: Male   Other Topics Concern   • None   Social History Narrative   • None     Social Determinants of Health     Financial Resource Strain: Not on file   Food Insecurity: No Food Insecurity (12/21/2022)    Received from [facility]    Hunger Vital Sign    • Worried About Running Out of Food in the Last Year: Never true    • Ran Out of Food  "in the Last Year: Never true   Transportation Needs: Not on file   Physical Activity: Not on file   Stress: Not on file   Social Connections: Not on file   Intimate Partner Violence: Not on file   Housing Stability: Low Risk  (7/22/2022)    Housing Stability Vital Sign    • Unable to Pay for Housing in the Last Year: No    • Number of Places Lived in the Last Year: 1    • Unstable Housing in the Last Year: No        Review of Systems   All other systems reviewed and are negative.        Vitals:  Vitals:    01/30/24 0930   BP: 110/70   BP Location: Left arm   Patient Position: Sitting   Cuff Size: Standard   Pulse: 84   SpO2: 95%   Weight: 68 kg (150 lb)   Height: 5' 4\" (1.626 m)     BMI - Body mass index is 25.75 kg/m².  Wt Readings from Last 7 Encounters:   01/30/24 68 kg (150 lb)   01/15/24 68.1 kg (150 lb 2.1 oz)   12/06/23 68.9 kg (152 lb)   11/27/23 68.9 kg (152 lb)   11/16/23 68.9 kg (152 lb)   11/15/23 68.9 kg (152 lb)   10/11/23 68 kg (150 lb)       Physical Exam  Vitals and nursing note reviewed.   Constitutional:       General: She is not in acute distress.     Appearance: Normal appearance. She is well-developed. She is not ill-appearing or diaphoretic.   HENT:      Head: Normocephalic and atraumatic.      Nose: No congestion.   Eyes:      General: No scleral icterus.     Conjunctiva/sclera: Conjunctivae normal.   Neck:      Vascular: No carotid bruit or JVD.   Cardiovascular:      Rate and Rhythm: Normal rate and regular rhythm.      Pulses: Normal pulses.      Heart sounds: Normal heart sounds. No murmur heard.     No friction rub. No gallop.   Pulmonary:      Effort: Pulmonary effort is normal. No respiratory distress.      Breath sounds: Normal breath sounds. No rales.   Abdominal:      General: There is no distension.      Palpations: Abdomen is soft.      Tenderness: There is no abdominal tenderness.   Musculoskeletal:         General: No swelling or tenderness.      Cervical back: Neck supple.      " "Right lower leg: No edema.      Left lower leg: No edema.   Skin:     General: Skin is warm.   Neurological:      General: No focal deficit present.      Mental Status: She is alert and oriented to person, place, and time. Mental status is at baseline.   Psychiatric:         Mood and Affect: Mood normal.         Behavior: Behavior normal.         Thought Content: Thought content normal.           Labs:  CBC:   Lab Results   Component Value Date    WBC 4.58 12/20/2023    RBC 5.03 12/20/2023    HGB 14.9 12/20/2023    HCT 47.3 (H) 12/20/2023    MCV 94 12/20/2023     12/20/2023    RDW 12.9 12/20/2023       CMP:   Lab Results   Component Value Date    K 4.4 12/20/2023     12/20/2023    CO2 29 12/20/2023    BUN 16 12/20/2023    CREATININE 0.87 12/20/2023    EGFR 64 12/20/2023    CALCIUM 9.9 12/20/2023    AST 20 12/20/2023    ALT 17 12/20/2023    ALKPHOS 49 12/20/2023       Magnesium:  Lab Results   Component Value Date    MG 2.1 07/22/2022       Lipid Profile:   Lab Results   Component Value Date    HDL 52 12/20/2023    TRIG 68 12/20/2023    LDLCALC 76 12/20/2023       Thyroid Studies:   Lab Results   Component Value Date    TKW8KSGVEPEU 1.027 12/20/2023    FREET4 1.30 12/04/2020       A1c:  No components found for: \"HGA1C\"    INR:  Lab Results   Component Value Date    INR 0.99 07/14/2022    INR 0.98 06/15/2022   5    Imaging: No results found.    Cardiac testing:   No results found for this or any previous visit.    No results found for this or any previous visit.    No results found for this or any previous visit.    No results found for this or any previous visit.      CT chest wo contrast  Narrative: CT CHEST WITHOUT IV CONTRAST    INDICATION:   C34.92: Malignant neoplasm of unspecified part of left bronchus or lung.    COMPARISON: Multiple CTs of the chest from January 23, 2022 to July 7, 2023    TECHNIQUE: CT examination of the chest was performed without intravenous contrast. Multiplanar 2D " reformatted images were created from the source data.    This examination, like all CT scans performed in the [facility], was performed utilizing techniques to minimize radiation dose exposure, including the use of iterative reconstruction and automated exposure control. Radiation dose length   product (DLP) for this visit:  192.9 mGy-cm    FINDINGS:    LUNGS: Stable area of postsurgical change, scarring and atelectasis in the left lower lobe. There is a 2 mm nodule along the major fissure in the right lower lobe on image 206/73 which was not previously seen. Stable mild biapical scarring. Central   airways are clear.    PLEURA:  Unremarkable.    HEART/GREAT VESSELS: Heart size normal. Coronary artery calcification. No pericardial effusion. No thoracic aortic aneurysm.    MEDIASTINUM AND CRISTIANO:  Unremarkable.    CHEST WALL AND LOWER NECK:  Unremarkable.    VISUALIZED STRUCTURES IN THE UPPER ABDOMEN:  Unremarkable.    OSSEOUS STRUCTURES:  No acute fracture or destructive osseous lesion.  Impression: There is a new 2 mm right lower lobe nodule along the major fissure. This is of low suspicion, but suggest attention on 12-month follow-up. Stable chronic scarring and atelectasis in the left lower lobe.    Workstation performed: [id number]

## 2024-02-19 ENCOUNTER — HOSPITAL ENCOUNTER (OUTPATIENT)
Facility: HOSPITAL | Age: 79
Discharge: HOME | End: 2024-02-19
Attending: INTERNAL MEDICINE
Payer: COMMERCIAL

## 2024-02-19 VITALS — HEIGHT: 64 IN | WEIGHT: 150 LBS | BODY MASS INDEX: 25.61 KG/M2

## 2024-02-19 DIAGNOSIS — Z12.31 ENCOUNTER FOR SCREENING MAMMOGRAM FOR MALIGNANT NEOPLASM OF BREAST: ICD-10-CM

## 2024-02-19 PROCEDURE — 77067 SCR MAMMO BI INCL CAD: CPT

## 2024-02-19 PROCEDURE — 77063 BREAST TOMOSYNTHESIS BI: CPT

## 2024-03-13 ENCOUNTER — OFFICE VISIT (OUTPATIENT)
Dept: PULMONOLOGY | Facility: CLINIC | Age: 79
End: 2024-03-13
Payer: COMMERCIAL

## 2024-03-13 VITALS
SYSTOLIC BLOOD PRESSURE: 120 MMHG | TEMPERATURE: 98.3 F | DIASTOLIC BLOOD PRESSURE: 68 MMHG | OXYGEN SATURATION: 98 % | HEART RATE: 78 BPM

## 2024-03-13 DIAGNOSIS — J44.9 OBSTRUCTIVE LUNG DISEASE (HCC): ICD-10-CM

## 2024-03-13 DIAGNOSIS — E03.9 ACQUIRED HYPOTHYROIDISM: ICD-10-CM

## 2024-03-13 DIAGNOSIS — G47.33 OSA (OBSTRUCTIVE SLEEP APNEA): ICD-10-CM

## 2024-03-13 DIAGNOSIS — I27.82 OTHER CHRONIC PULMONARY EMBOLISM WITHOUT ACUTE COR PULMONALE (HCC): ICD-10-CM

## 2024-03-13 DIAGNOSIS — C34.92 ADENOCARCINOMA OF LEFT LUNG (HCC): Primary | ICD-10-CM

## 2024-03-13 PROCEDURE — 99214 OFFICE O/P EST MOD 30 MIN: CPT | Performed by: INTERNAL MEDICINE

## 2024-03-13 NOTE — PROGRESS NOTES
Progress Note - Pulmonary   Geneva Knowles 78 y.o. female MRN: [id number]   Encounter: [id number]      Assessment/Plan:  Patient is a 78-year-old female with past medical history seen for adenocarcinoma of the lung status post resection, DORIS noncompliant with CPAP who presents for routine follow-up.  Overall, the patient is doing well from a respiratory standpoint she had a new 2 mm nodule noted on follow-up with thoracic surgery.  Repeat imaging scheduled for July/2024.  She denies any other limitations based on her breathing.    The patient is primary concern is related to itching along her abdomen.  It appears to be in a bandlike distribution possible to the dermatomal.  She has an extensive regimen including diluting lotions and calamine lotions and cool air which help provide relief.  She is followed by dermatology.    Interestingly, the patient reports having a full home visit by an insurance nurse practitioner?  She provided paperwork which showed her FEV1 to FVC ratio was 0.78 but no other details of this in-home spirometry was completed.  Additionally, it noted normal ankle-brachial index.    Adenocarcinoma s/p resection  -  Resection in 7/2022  -  followed by thoracic surgery   -  2mm nodule noted   -  repeat imaging in 6 mos     Obstructive sleep apnea/nocturnal hypoxia  -  Not using cpap     Dyspnea on exertion  -  Strong family history of asthma  -  Not using inhalers  -  Breathing is stable     Remote history of pulmonary embolism  -  Currently requiring only aspirin 81 mg  -  Occurred about 1990 or so  -  Last took anticoagulants in about 2011     Hypothyroidism  -  No symptoms of hypothyroidism  -  continue synthroid    Itching on abdomen  -  causing discomfort  -  in dermatomal distribution  - has routine including creams and washes  -  followed by dermatology    1. Adenocarcinoma of left lung (HCC)    2. Obstructive lung disease (HCC)    3. DORIS (obstructive sleep apnea)    4. Other chronic pulmonary  embolism without acute cor pulmonale (HCC)        Patient may follow up in 12 months or sooner as necessary.     Orders:  No orders of the defined types were placed in this encounter.      Subjective:   The patient had a recent evaluation at home by insurance company. PAD negative.  Normal FEV1/FVC ratio. Data provided by patient but not available in EPIC.      The patient has significant itching for which she sees dermatology.  She does take cold showers, calamine lotion and claritin.  The skin changes are in a dermatomal distribution.      She notes her breathing is doing well at night.      Inhaler Regimen:  None    Remainder of review of systems negative except as described in HPI.      The following portions of the patient's history were reviewed and updated as appropriate: allergies, current medications, past family history, past medical history, past social history, past surgical history and problem list.     Objective:   Vitals: Blood pressure 120/68, pulse 78, temperature 98.3 °F (36.8 °C), temperature source Tympanic, SpO2 98%, not currently breastfeeding., RA, There is no height or weight on file to calculate BMI.    Physical Exam  Gen: Pleasant, awake, alert, oriented x 3, no acute distress  HEENT: Mucous membranes moist, no oral lesions, no thrush  NECK: No accessory muscle use, JVP not elevated  Cardiac: RRR, single S1, single S2, no murmurs, no rubs, no gallops  Lungs: CTA b/l  Abdomen: normoactive bowel sounds, soft nontender, nondistended, no rebound or rigidity, no guarding, thin  Extremities: no cyanosis, no clubbing,  no LE edema  MSK:  Strength equal in all extremities  Derm:  No rashes/lesions noted; changes in a band like distribution - dermatomal  Neuro:  Appropriate mood/affect    Labs: I have personally reviewed pertinent lab results.  Lab Results   Component Value Date    WBC 4.58 12/20/2023    HGB 14.9 12/20/2023     12/20/2023     Lab Results   Component Value Date    CREATININE  0.87 12/20/2023    CREATININE 0.79 02/13/2020        Imaging and other studies: I have personally reviewed pertinent reports.   and I have personally reviewed pertinent films in PACS  CT Chest 1/2/2024  My interpretation:  2mm nodule    Radiology findings:  LUNGS: Stable area of postsurgical change, scarring and atelectasis in the left lower lobe. There is a 2 mm nodule along the major fissure in the right lower lobe on image 206/73 which was not previously seen. Stable mild biapical scarring. Central airways are clear.  PLEURA:  Unremarkable.  HEART/GREAT VESSELS: Heart size normal. Coronary artery calcification. No pericardial effusion. No thoracic aortic aneurysm.  MEDIASTINUM AND CRISTIANO:  Unremarkable.  CHEST WALL AND LOWER NECK:  Unremarkable.  VISUALIZED STRUCTURES IN THE UPPER ABDOMEN:  Unremarkable.  OSSEOUS STRUCTURES:  No acute fracture or destructive osseous lesion.    Pulmonary Function Testing: I have personally reviewed pertinent reports.   and I have personally reviewed pertinent films in PACS  6/30/2022:  FEV1/FVC Ratio:  1.53 % (56 % predicted)  Forced Vital Capacity:  0.96 L   46 % predicted  FEV1:  0.96 L    46 % predicted  After administration of bronchodilator FEV1: 0.93 (-2%)  Lung volumes by body plethysmography: Total Lung Capacity 72 % predicted Residual volume 96 % predicted  RV/TLC ratio 132 %  DLCO corrected for patients hemoglobin level:  50 %    Link Novak MD  [facility]

## 2024-03-14 ENCOUNTER — APPOINTMENT (OUTPATIENT)
Dept: LAB | Facility: HOSPITAL | Age: 79
End: 2024-03-14
Payer: COMMERCIAL

## 2024-03-14 DIAGNOSIS — B96.81 HELICOBACTER PYLORI (H. PYLORI) AS THE CAUSE OF DISEASES CLASSIFIED ELSEWHERE: ICD-10-CM

## 2024-03-14 LAB — HEMOCCULT STL QL IA: NEGATIVE

## 2024-03-14 PROCEDURE — 87338 HPYLORI STOOL AG IA: CPT

## 2024-03-14 PROCEDURE — G0328 FECAL BLOOD SCRN IMMUNOASSAY: HCPCS

## 2024-03-15 LAB — H PYLORI AG STL QL IA: NEGATIVE

## 2024-03-27 ENCOUNTER — TELEPHONE (OUTPATIENT)
Age: 79
End: 2024-03-27

## 2024-03-27 NOTE — TELEPHONE ENCOUNTER
Patient calling asking if we can send a message to Dr. Novak as she would like to know which stage her adenocarcinoma was. She has no other questions and was just curious to what stage she had. Please advise.

## 2024-04-17 ENCOUNTER — APPOINTMENT (OUTPATIENT)
Dept: LAB | Facility: HOSPITAL | Age: 79
End: 2024-04-17
Payer: COMMERCIAL

## 2024-04-17 DIAGNOSIS — G56.01 CARPAL TUNNEL SYNDROME ON RIGHT: ICD-10-CM

## 2024-04-17 DIAGNOSIS — E55.9 VITAMIN D DEFICIENCY: ICD-10-CM

## 2024-04-17 DIAGNOSIS — E53.8 VITAMIN B12 DEFICIENCY: ICD-10-CM

## 2024-04-18 ENCOUNTER — APPOINTMENT (OUTPATIENT)
Dept: LAB | Facility: HOSPITAL | Age: 79
End: 2024-04-18
Payer: COMMERCIAL

## 2024-04-18 ENCOUNTER — HOSPITAL ENCOUNTER (OUTPATIENT)
Dept: RADIOLOGY | Facility: HOSPITAL | Age: 79
Discharge: HOME | End: 2024-04-18
Payer: COMMERCIAL

## 2024-04-18 ENCOUNTER — TELEPHONE (OUTPATIENT)
Age: 79
End: 2024-04-18

## 2024-04-18 DIAGNOSIS — G56.01 CARPAL TUNNEL SYNDROME ON RIGHT: ICD-10-CM

## 2024-04-18 DIAGNOSIS — E55.9 VITAMIN D DEFICIENCY: ICD-10-CM

## 2024-04-18 DIAGNOSIS — E53.8 VITAMIN B12 DEFICIENCY: ICD-10-CM

## 2024-04-18 DIAGNOSIS — M19.90 SENILE ARTHRITIS: ICD-10-CM

## 2024-04-18 LAB
25(OH)D3 SERPL-MCNC: 44.9 NG/ML (ref 30–100)
ALBUMIN SERPL BCP-MCNC: 4.2 G/DL (ref 3.5–5)
ALP SERPL-CCNC: 57 U/L (ref 34–104)
ALT SERPL W P-5'-P-CCNC: 15 U/L (ref 7–52)
ANION GAP SERPL CALCULATED.3IONS-SCNC: 8 MMOL/L (ref 4–13)
AST SERPL W P-5'-P-CCNC: 18 U/L (ref 13–39)
BASOPHILS # BLD AUTO: 0.04 THOUSANDS/ÂΜL (ref 0–0.1)
BASOPHILS NFR BLD AUTO: 1 % (ref 0–1)
BILIRUB SERPL-MCNC: 0.69 MG/DL (ref 0.2–1)
BUN SERPL-MCNC: 21 MG/DL (ref 5–25)
CALCIUM SERPL-MCNC: 9.7 MG/DL (ref 8.4–10.2)
CHLORIDE SERPL-SCNC: 106 MMOL/L (ref 96–108)
CHOLEST SERPL-MCNC: 212 MG/DL
CO2 SERPL-SCNC: 28 MMOL/L (ref 21–32)
CREAT SERPL-MCNC: 0.84 MG/DL (ref 0.6–1.3)
EOSINOPHIL # BLD AUTO: 0.25 THOUSAND/ÂΜL (ref 0–0.61)
EOSINOPHIL NFR BLD AUTO: 6 % (ref 0–6)
ERYTHROCYTE [DISTWIDTH] IN BLOOD BY AUTOMATED COUNT: 13.6 % (ref 11.6–15.1)
GFR SERPL CREATININE-BSD FRML MDRD: 67 ML/MIN/1.73SQ M
GLUCOSE P FAST SERPL-MCNC: 93 MG/DL (ref 65–99)
HCT VFR BLD AUTO: 44.4 % (ref 34.8–46.1)
HDLC SERPL-MCNC: 52 MG/DL
HGB BLD-MCNC: 14.1 G/DL (ref 11.5–15.4)
IMM GRANULOCYTES # BLD AUTO: 0.01 THOUSAND/UL (ref 0–0.2)
IMM GRANULOCYTES NFR BLD AUTO: 0 % (ref 0–2)
LDLC SERPL CALC-MCNC: 144 MG/DL (ref 0–100)
LYMPHOCYTES # BLD AUTO: 2.4 THOUSANDS/ÂΜL (ref 0.6–4.47)
LYMPHOCYTES NFR BLD AUTO: 57 % (ref 14–44)
MCH RBC QN AUTO: 29.4 PG (ref 26.8–34.3)
MCHC RBC AUTO-ENTMCNC: 31.8 G/DL (ref 31.4–37.4)
MCV RBC AUTO: 93 FL (ref 82–98)
MONOCYTES # BLD AUTO: 0.26 THOUSAND/ÂΜL (ref 0.17–1.22)
MONOCYTES NFR BLD AUTO: 6 % (ref 4–12)
NEUTROPHILS # BLD AUTO: 1.27 THOUSANDS/ÂΜL (ref 1.85–7.62)
NEUTS SEG NFR BLD AUTO: 30 % (ref 43–75)
NONHDLC SERPL-MCNC: 160 MG/DL
NRBC BLD AUTO-RTO: 0 /100 WBCS
PLATELET # BLD AUTO: 192 THOUSANDS/UL (ref 149–390)
PMV BLD AUTO: 10.1 FL (ref 8.9–12.7)
POTASSIUM SERPL-SCNC: 4 MMOL/L (ref 3.5–5.3)
PROT SERPL-MCNC: 7.9 G/DL (ref 6.4–8.4)
RBC # BLD AUTO: 4.79 MILLION/UL (ref 3.81–5.12)
SODIUM SERPL-SCNC: 142 MMOL/L (ref 135–147)
TRIGL SERPL-MCNC: 78 MG/DL
TSH SERPL DL<=0.05 MIU/L-ACNC: 2.5 UIU/ML (ref 0.45–4.5)
VIT B12 SERPL-MCNC: 906 PG/ML (ref 180–914)
WBC # BLD AUTO: 4.23 THOUSAND/UL (ref 4.31–10.16)

## 2024-04-18 PROCEDURE — 36415 COLL VENOUS BLD VENIPUNCTURE: CPT

## 2024-04-18 PROCEDURE — 82607 VITAMIN B-12: CPT

## 2024-04-18 PROCEDURE — 80053 COMPREHEN METABOLIC PANEL: CPT

## 2024-04-18 PROCEDURE — 84443 ASSAY THYROID STIM HORMONE: CPT

## 2024-04-18 PROCEDURE — 80061 LIPID PANEL: CPT

## 2024-04-18 PROCEDURE — 73130 X-RAY EXAM OF HAND: CPT

## 2024-04-18 PROCEDURE — 82306 VITAMIN D 25 HYDROXY: CPT

## 2024-04-18 PROCEDURE — 85025 COMPLETE CBC W/AUTO DIFF WBC: CPT

## 2024-04-18 NOTE — TELEPHONE ENCOUNTER
PT call in about the imaging for her wrist. Did inform the PT to contact the main scheduling number for schedule her appointment.

## 2024-05-22 ENCOUNTER — OFFICE VISIT (OUTPATIENT)
Dept: OBGYN CLINIC | Facility: CLINIC | Age: 79
End: 2024-05-22
Payer: COMMERCIAL

## 2024-05-22 VITALS
HEIGHT: 64 IN | WEIGHT: 147 LBS | BODY MASS INDEX: 25.1 KG/M2 | SYSTOLIC BLOOD PRESSURE: 108 MMHG | DIASTOLIC BLOOD PRESSURE: 62 MMHG

## 2024-05-22 DIAGNOSIS — B37.89 CANDIDA RASH OF GROIN: Primary | ICD-10-CM

## 2024-05-22 PROCEDURE — 99213 OFFICE O/P EST LOW 20 MIN: CPT | Performed by: PHYSICIAN ASSISTANT

## 2024-05-22 RX ORDER — CLOTRIMAZOLE AND BETAMETHASONE DIPROPIONATE 10; .64 MG/G; MG/G
CREAM TOPICAL 3 TIMES DAILY
Qty: 45 G | Refills: 1 | Status: SHIPPED | OUTPATIENT
Start: 2024-05-22 | End: 2024-05-29

## 2024-05-22 NOTE — PROGRESS NOTES
Assessment/Plan:      Diagnoses and all orders for this visit:    Candida rash of groin  -     clotrimazole-betamethasone (LOTRISONE) 1-0.05 % cream; Apply topically 3 (three) times a day for 7 days To groin areas     78-year-old female presenting today for possible yeast infection.  After much clarification she does not have any vaginal symptoms and her rash and itching is more so in bilateral groin areas.  States has treated with Monistat and 1 dose of fluconazole prescribed by her PCP without relief.  Vulvovaginal exam with general postmenopausal atrophy but no acute symptoms or exam findings.  Will treat Candida rash of groin with topical Lotrisone applied to affected areas 2-3 times a day for a week.  Patient advised to keep area clean and dry.    Occasional vaginal dryness - can consider vaginal estrace if needed.    Patient did note a day of a dull ache in the lower abdominal area generalized.  She has minimal tenderness on exam but no other palpable abnormality.  She does not have any tenderness lower in the lower pelvic area.  She denies any acute urinary symptoms, postmenopausal vaginal bleeding or pain, she had no pain on bimanual exam or with vaginal exam.  She denies any changes to her bowel habits.  I recommended patient monitor symptoms since this is new with no other exam findings today and she will follow-up closely if symptoms persist or worsen.    Chief Complaint   Patient presents with    Vaginal Itching     External - Patient states tried OTC monistat and was prescribed diflucan by PCP but still having itching         Subjective:     Patient ID: Geneva Knowles is a 78 y.o. female.    79y/o F here today for itching x 1 week. Tried using monistat and 1 dose of fluconazole at recommendation of pharmacist and her PCP.   States itching started externally more so suprapubic area.   She denies any intravaginal discharge or odor.  Denies vagianl pain or bleeding.    She notes a dull ache in lower abdomen  past 1-2 days.  Denies urinary sxs, hematuria, flank pain, hematochezia, N/V, fever or change to her bowels.  Denies PMB.  She does note some vaginal dryness on occasion.          Review of Systems   Constitutional: Negative.    Gastrointestinal:  Positive for abdominal pain (Lower).   Genitourinary:  Negative for dysuria, frequency, genital sores, pelvic pain, urgency, vaginal bleeding, vaginal discharge and vaginal pain.        Itching in bilateral groin, suprapubic area         The following portions of the patient's history were reviewed and updated as appropriate: allergies, current medications, past family history, past medical history, past social history, past surgical history, and problem list.      Objective:     Physical Exam  Vitals reviewed.   Constitutional:       Appearance: Normal appearance. She is not ill-appearing.   Genitourinary:         Comments: Moist redness noted bilateral groin areas without lesion or open sore.  Inspection of external vulva as well as internal vaginal exam with speculum with general postmenopausal atrophy otherwise no other significant abnormalities.  Lymphadenopathy:      Lower Body: No right inguinal adenopathy. No left inguinal adenopathy.   Neurological:      Mental Status: She is alert and oriented to person, place, and time.   Psychiatric:         Mood and Affect: Mood normal.         Behavior: Behavior normal. Behavior is cooperative.

## 2024-07-08 ENCOUNTER — HOSPITAL ENCOUNTER (OUTPATIENT)
Dept: CT IMAGING | Facility: HOSPITAL | Age: 79
Discharge: HOME | End: 2024-07-08
Payer: COMMERCIAL

## 2024-07-08 ENCOUNTER — HOSPITAL ENCOUNTER (OUTPATIENT)
Facility: HOSPITAL | Age: 79
Discharge: HOME | End: 2024-07-08
Payer: COMMERCIAL

## 2024-07-08 ENCOUNTER — APPOINTMENT (OUTPATIENT)
Dept: RADIOLOGY | Facility: HOSPITAL | Age: 79
End: 2024-07-08
Payer: COMMERCIAL

## 2024-07-08 VITALS — WEIGHT: 146.8 LBS | BODY MASS INDEX: 27.02 KG/M2 | HEIGHT: 62 IN

## 2024-07-08 DIAGNOSIS — M85.80 OSTEOPENIA, UNSPECIFIED LOCATION: ICD-10-CM

## 2024-07-08 DIAGNOSIS — C34.92 ADENOCARCINOMA OF LEFT LUNG (HCC): ICD-10-CM

## 2024-07-08 DIAGNOSIS — Z78.0 POSTMENOPAUSAL STATE: ICD-10-CM

## 2024-07-08 DIAGNOSIS — Z13.820 SCREENING FOR OSTEOPOROSIS: ICD-10-CM

## 2024-07-08 PROCEDURE — 77080 DXA BONE DENSITY AXIAL: CPT

## 2024-07-08 PROCEDURE — 71250 CT THORAX DX C-: CPT

## 2024-07-18 ENCOUNTER — TELEPHONE (OUTPATIENT)
Age: 79
End: 2024-07-18

## 2024-07-18 NOTE — TELEPHONE ENCOUNTER
Pt called said she is interested on getting more information  about Fosamax Medication if Erica  could call her

## 2024-07-23 ENCOUNTER — APPOINTMENT (OUTPATIENT)
Dept: LAB | Facility: HOSPITAL | Age: 79
End: 2024-07-23
Payer: COMMERCIAL

## 2024-07-23 DIAGNOSIS — E78.49 FAMILIAL COMBINED HYPERLIPIDEMIA: ICD-10-CM

## 2024-07-23 LAB
ALBUMIN SERPL BCG-MCNC: 4.2 G/DL (ref 3.5–5)
ALP SERPL-CCNC: 61 U/L (ref 34–104)
ALT SERPL W P-5'-P-CCNC: 17 U/L (ref 7–52)
ANION GAP SERPL CALCULATED.3IONS-SCNC: 8 MMOL/L (ref 4–13)
AST SERPL W P-5'-P-CCNC: 19 U/L (ref 13–39)
BILIRUB SERPL-MCNC: 0.55 MG/DL (ref 0.2–1)
BUN SERPL-MCNC: 21 MG/DL (ref 5–25)
CALCIUM SERPL-MCNC: 10 MG/DL (ref 8.4–10.2)
CHLORIDE SERPL-SCNC: 106 MMOL/L (ref 96–108)
CHOLEST SERPL-MCNC: 154 MG/DL
CO2 SERPL-SCNC: 25 MMOL/L (ref 21–32)
CREAT SERPL-MCNC: 0.79 MG/DL (ref 0.6–1.3)
GFR SERPL CREATININE-BSD FRML MDRD: 72 ML/MIN/1.73SQ M
GLUCOSE SERPL-MCNC: 89 MG/DL (ref 65–140)
HDLC SERPL-MCNC: 53 MG/DL
LDLC SERPL CALC-MCNC: 74 MG/DL (ref 0–100)
NONHDLC SERPL-MCNC: 101 MG/DL
POTASSIUM SERPL-SCNC: 4.4 MMOL/L (ref 3.5–5.3)
PROT SERPL-MCNC: 7.8 G/DL (ref 6.4–8.4)
SODIUM SERPL-SCNC: 139 MMOL/L (ref 135–147)
TRIGL SERPL-MCNC: 134 MG/DL

## 2024-07-23 PROCEDURE — 80061 LIPID PANEL: CPT

## 2024-07-23 PROCEDURE — 36415 COLL VENOUS BLD VENIPUNCTURE: CPT

## 2024-07-23 PROCEDURE — 80053 COMPREHEN METABOLIC PANEL: CPT

## 2024-07-23 NOTE — TELEPHONE ENCOUNTER
I tried calling patient to discuss Fosamax further.  I may recommend having her discussed with pulmonologist considering her previous lung cancer diagnosis and safety use of the Fosamax.  Left message of why was calling and told her I would try calling her again tomorrow.

## 2024-07-24 NOTE — TELEPHONE ENCOUNTER
I tried calling patient again today and was able to speak with her directly.  I discussed her DEXA scan results with her in detail.  Based on previous DEXA scan from 2016 it did appear that she had some mild osteopenia at that time as well.  Discussed meaning of results of T-score as well as her fracture risk.  Her osteoporotic fracture risk is below percentage recommendation for treatment however the 10-year percentage risk of 3.2% is just over percentage recommendation for treatment.  Patient desire management and would like to consider Fosamax.  Risk versus benefit, potential side effect and how to take medication was all reviewed with her today.  She would need to take the medication by itself with water and wait 30 minutes before taking any other medication, vitamins, eating or drinking.  She needs to remain upright for at least 30 to 60 minutes as well.  I did ask that with her cancer history she discuss okay to take with her lung specialist.  Patient also desires second opinion from PCP as well.  She will get back to me if she desires to trial Fosamax and with her decision.  She was encouraged weightbearing activity/exercise as tolerated as well as continuing vitamin D and calcium supplementation.

## 2024-07-25 ENCOUNTER — APPOINTMENT (EMERGENCY)
Dept: CT IMAGING | Facility: HOSPITAL | Age: 79
End: 2024-07-25
Payer: COMMERCIAL

## 2024-07-25 ENCOUNTER — HOSPITAL ENCOUNTER (EMERGENCY)
Facility: HOSPITAL | Age: 79
Discharge: HOME | End: 2024-07-25
Attending: EMERGENCY MEDICINE
Payer: COMMERCIAL

## 2024-07-25 VITALS
OXYGEN SATURATION: 96 % | RESPIRATION RATE: 18 BRPM | TEMPERATURE: 98.2 F | SYSTOLIC BLOOD PRESSURE: 153 MMHG | HEART RATE: 98 BPM | DIASTOLIC BLOOD PRESSURE: 101 MMHG

## 2024-07-25 DIAGNOSIS — J01.90 ACUTE SINUSITIS: Primary | ICD-10-CM

## 2024-07-25 DIAGNOSIS — H57.11 ACUTE RIGHT EYE PAIN: ICD-10-CM

## 2024-07-25 DIAGNOSIS — R51.9 ACUTE NONINTRACTABLE HEADACHE, UNSPECIFIED HEADACHE TYPE: ICD-10-CM

## 2024-07-25 PROCEDURE — 70450 CT HEAD/BRAIN W/O DYE: CPT

## 2024-07-25 PROCEDURE — 99283 EMERGENCY DEPT VISIT LOW MDM: CPT

## 2024-07-25 PROCEDURE — 99284 EMERGENCY DEPT VISIT MOD MDM: CPT | Performed by: EMERGENCY MEDICINE

## 2024-07-25 RX ORDER — DOXYCYCLINE HYCLATE 100 MG/1
100 CAPSULE ORAL 2 TIMES DAILY
Qty: 14 CAPSULE | Refills: 0 | Status: SHIPPED | OUTPATIENT
Start: 2024-07-25 | End: 2024-08-01

## 2024-07-25 RX ORDER — DOXYCYCLINE HYCLATE 100 MG/1
100 CAPSULE ORAL ONCE
Status: COMPLETED | OUTPATIENT
Start: 2024-07-25 | End: 2024-07-25

## 2024-07-25 RX ORDER — TETRACAINE HYDROCHLORIDE 5 MG/ML
1 SOLUTION OPHTHALMIC ONCE
Status: DISCONTINUED | OUTPATIENT
Start: 2024-07-25 | End: 2024-07-25

## 2024-07-25 RX ADMIN — DOXYCYCLINE 100 MG: 100 CAPSULE ORAL at 16:55

## 2024-07-25 NOTE — ED PROVIDER NOTES
History  Chief Complaint   Patient presents with    Eye Pain     Pt reports having (R) eye pain that is cause a headache. No meds pta.      78-year-old female with history of lung adenocarcinoma, depression, DVT, PE, hyperlipidemia who presents for evaluation of right eye pain and headache.  Patient reports that she first noticed a brown spot along the right side of her nose near her eye a few days ago.  She states that she saw an eye doctor 2 days ago at which time the pressures in her eye were checked and were within normal limits.  They recommended that she follow-up with someone to get the area biopsied.  She was not having any pain in the area at that time.  Starting last night, she developed pain around the area of discoloration which has now spread to her right eye and the right side of her head.  She complains of a headache which is not typical for her.  She denies any vision changes but notes that sometimes it feels hard to focus.  She has not had any photophobia, nausea, vomiting, weakness.  She has not taken any medications for her symptoms and declines medications here.        Prior to Admission Medications   Prescriptions Last Dose Informant Patient Reported? Taking?   Diclofenac Sodium (VOLTAREN) 1 %  Self No No   Sig: Apply 2 g topically 4 (four) times a day   Omega-3 Fatty Acids (fish oil) 1,000 mg  Self Yes No   Sig: Take 1,000 mg by mouth every morning Pt unsure dose   albuterol (PROVENTIL HFA,VENTOLIN HFA) 90 mcg/act inhaler  Self Yes No   Sig: INHALE 2 PUFFS BY MOUTH EVERY 4 HOURS AS DIRECTED AS NEEDED   aspirin 81 mg chewable tablet  Self Yes No   Sig: Chew 81 mg every morning   cholecalciferol (VITAMIN D3) 400 units tablet  Self Yes No   Sig: Take 400 Units by mouth every morning   clotrimazole-betamethasone (LOTRISONE) 1-0.05 % cream   No No   Sig: Apply topically 3 (three) times a day for 7 days To groin areas   cyanocobalamin (VITAMIN B-12) 100 mcg tablet  Self Yes No   Sig: Take by mouth  daily Pt unsure dose     levothyroxine 100 mcg tablet  Self Yes No   Sig: Take 100 mcg by mouth daily   loratadine (CLARITIN) 10 mg tablet  Self Yes No   Sig: Take 10 mg by mouth as needed for allergies   magnesium gluconate (MAGONATE) 500 MG tablet  Self Yes No   Sig: Take 500 mg by mouth 2 (two) times a day   rosuvastatin (CRESTOR) 5 mg tablet  Self No No   Sig: TAKE 1 TABLET(5 MG) BY MOUTH DAILY   saccharomyces boulardii (FLORASTOR) 250 mg capsule  Self Yes No   Sig: Take 250 mg by mouth 2 (two) times a day   sodium picosulfate, magnesium oxide, citric acid (Clenpiq) oral solution  Self No No   Sig: Take 175 mL (1 bottle) the evening before the colonoscopy, between 5 PM and 9 PM, followed by a second 175 mL bottle 5 hours before the colonoscopy.   Patient not taking: Reported on 11/16/2023   triamcinolone (KENALOG) 0.025 % cream  Self Yes No   Sig: APPLY TOPICALLY TO THE AFFECTED AREA TWICE DAILY FOR 1 WEEK   vitamin E 100 UNIT capsule  Self Yes No   Sig: Take 100 Units by mouth daily Pt unsure        Facility-Administered Medications: None       Past Medical History:   Diagnosis Date    Adenocarcinoma of lung (HCC) 07/13/2022    Arthritis     Borderline diabetes     diet controlled    Cancer (HCC)     Colon polyp     CPAP (continuous positive airway pressure) dependence     refuses to use    Depression     Disease of thyroid gland     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    DVT (deep venous thrombosis) (HCC)     hx- left leg    GERD (gastroesophageal reflux disease)     Hyperlipidemia     PONV (postoperative nausea and vomiting)     Pulmonary emboli (HCC)     during pregnancy    Rash     arms on occasion, cause unknown    Sleep apnea        Past Surgical History:   Procedure Laterality Date    COLONOSCOPY      DILATION AND CURETTAGE, DIAGNOSTIC / THERAPEUTIC      IR BIOPSY LUNG  06/15/2022    OH BRNCC INCL FLUOR GDNCE DX W/CELL WASHG SPX N/A 07/21/2022    Procedure: BRONCHOSCOPY FLEXIBLE;  Surgeon: Raulito  MD Ruthy;  Location: [facility];  Service: Thoracic    NM THORACOSCOPY W/THERA WEDGE RESEXN INITIAL UNILAT Left 07/21/2022    Procedure: RESECTION,LUNG WEDGE THORACOSCOPIC W/ ROBOTICS, w/ mediastinal lymph node dissection;  Surgeon: Raulito Bliss MD;  Location: [facility];  Service: Thoracic    THYROIDECTOMY, PARTIAL      2020    TONSILLECTOMY      age 7    UPPER GASTROINTESTINAL ENDOSCOPY         Family History   Problem Relation Age of Onset    No Known Problems Mother     Cancer Sister 80    No Known Problems Sister     Asthma Daughter     No Known Problems Maternal Grandmother     No Known Problems Maternal Grandfather     No Known Problems Paternal Grandmother     No Known Problems Paternal Grandfather     No Known Problems Son     No Known Problems Maternal Aunt     No Known Problems Maternal Aunt     No Known Problems Paternal Aunt     No Known Problems Paternal Aunt     Breast cancer Other 62    Breast cancer additional onset Neg Hx     BRCA2 Positive Neg Hx     BRCA2 Negative Neg Hx     BRCA1 Positive Neg Hx     BRCA1 Negative Neg Hx     BRCA 1/2 Neg Hx     Ovarian cancer Neg Hx     Endometrial cancer Neg Hx     Colon cancer Neg Hx      I have reviewed and agree with the history as documented.    E-Cigarette/Vaping    E-Cigarette Use Never User      E-Cigarette/Vaping Substances    Nicotine No     THC No     CBD No     Flavoring No     Other No     Unknown No      Social History     Tobacco Use    Smoking status: Never    Smokeless tobacco: Never   Vaping Use    Vaping status: Never Used   Substance Use Topics    Alcohol use: Never    Drug use: Never       Review of Systems   Constitutional:  Negative for appetite change, chills and fever.   Eyes:  Positive for pain. Negative for discharge, redness and visual disturbance.   Respiratory:  Negative for shortness of breath.    Cardiovascular:  Negative for chest pain.   Gastrointestinal:  Negative for abdominal pain, nausea and vomiting.    Genitourinary:  Negative for flank pain.   Musculoskeletal:  Negative for gait problem.   Skin:  Negative for rash.   Neurological:  Positive for headaches. Negative for weakness and numbness.   All other systems reviewed and are negative.      Physical Exam  Physical Exam  Vitals and nursing note reviewed.   Constitutional:       General: She is not in acute distress.     Appearance: She is well-developed. She is not ill-appearing.   HENT:      Head: Normocephalic and atraumatic.      Nose: Nose normal.      Mouth/Throat:      Mouth: Oropharynx is clear and moist. Mucous membranes are moist.   Eyes:      Extraocular Movements: Extraocular movements intact and EOM normal.      Conjunctiva/sclera: Conjunctivae normal.      Pupils: Pupils are equal, round, and reactive to light.      Comments: No proptosis.  No periorbital edema or erythema.   Cardiovascular:      Rate and Rhythm: Normal rate and regular rhythm.      Heart sounds: No murmur heard.     No friction rub. No gallop.   Pulmonary:      Effort: Pulmonary effort is normal.      Breath sounds: Normal breath sounds. No wheezing, rhonchi or rales.   Abdominal:      General: There is no distension.      Palpations: Abdomen is soft.      Tenderness: There is no abdominal tenderness.   Musculoskeletal:         General: No swelling, tenderness or edema. Normal range of motion.      Cervical back: Normal range of motion and neck supple. No rigidity.   Skin:     General: Skin is warm and dry.      Coloration: Skin is not pale.      Findings: No rash.      Comments: Brown-colored lesion noted to the right side of the upper nose.  The area is mildly tender.  No surrounding erythema or drainage.  No significant swelling.   Neurological:      General: No focal deficit present.      Mental Status: She is alert and oriented to person, place, and time.      Cranial Nerves: No cranial nerve deficit.      Sensory: No sensory deficit.      Motor: No weakness.       Coordination: Coordination normal.      Gait: Gait normal.   Psychiatric:         Mood and Affect: Mood and affect normal.         Behavior: Behavior normal.         Vital Signs  ED Triage Vitals [07/25/24 1533]   Temperature Pulse Respirations Blood Pressure SpO2   98.2 °F (36.8 °C) 98 18 (!) 153/101 96 %      Temp Source Heart Rate Source Patient Position - Orthostatic VS BP Location FiO2 (%)   Oral Monitor Sitting Left arm --      Pain Score       --           Vitals:    07/25/24 1533   BP: (!) 153/101   Pulse: 98   Patient Position - Orthostatic VS: Sitting         Visual Acuity  Visual Acuity      Flowsheet Row Most Recent Value   Visual acuity R eye is 20/50   Visual acuity Left eye is 20/50   Visual acuity in both eyes is 20/50            ED Medications  Medications   doxycycline hyclate (VIBRAMYCIN) capsule 100 mg (has no administration in time range)       Diagnostic Studies  Results Reviewed       None                   CT head without contrast   Final Result by Adam Burns MD (07/25 1631)      Significant right-sided sinus mucosal disease. Assessment for signs of acute sinusitis is advised. Findings may be related to patient's reported right eye pain.                  Workstation performed: [id number]                    Procedures  Procedures         ED Course  ED Course as of 07/25/24 1651   Thu Jul 25, 2024   1612 Bedside ocular US performed showing no retinal detachment, optic nerve diameter of 0.39 cm. IOP measured at 11.3 mmHg.                                 SBIRT 22yo+      Flowsheet Row Most Recent Value   Initial Alcohol Screen: US AUDIT-C     1. How often do you have a drink containing alcohol? 0 Filed at: 07/25/2024 1533   2. How many drinks containing alcohol do you have on a typical day you are drinking?  0 Filed at: 07/25/2024 1533   3a. Male UNDER 65: How often do you have five or more drinks on one occasion? 0 Filed at: 07/25/2024 1533   3b. FEMALE Any Age, or MALE 65+: How often do  you have 4 or more drinks on one occassion? 0 Filed at: 07/25/2024 1533   Audit-C Score 0 Filed at: 07/25/2024 1533   ELSA: How many times in the past year have you...    Used an illegal drug or used a prescription medication for non-medical reasons? Never Filed at: 07/25/2024 1533                      Medical Decision Making  78-year-old female presenting for evaluation of right eye pain and headache.  Patient denies any focal neurologic deficits.  No vision changes.  Visual acuity is within normal limits.  No evidence of giant cell arteritis.  Bedside ultrasound performed to rule out retinal detachment which was unremarkable.  Optic nerve diameter within normal limits.  CT head obtained to rule out intracranial hemorrhage.  Showing evidence of acute sinusitis which could be the cause of patient's symptoms. Initiated on doxycycline as she has a penicillin allergy. Advised follow-up with ophthalmology, primary care physician.  Return precautions discussed.    Problems Addressed:  Acute nonintractable headache, unspecified headache type: acute illness or injury  Acute right eye pain: acute illness or injury  Acute sinusitis: acute illness or injury    Amount and/or Complexity of Data Reviewed  Radiology: ordered.                 Disposition  Final diagnoses:   Acute sinusitis   Acute right eye pain   Acute nonintractable headache, unspecified headache type     Time reflects when diagnosis was documented in both MDM as applicable and the Disposition within this note       Time User Action Codes Description Comment    7/25/2024  4:49 PM Ximena Ashraf [J01.90] Acute sinusitis     7/25/2024  4:49 PM Ximena Ashraf [H57.11] Acute right eye pain     7/25/2024  4:49 PM Ximena Ashraf [R51.9] Acute nonintractable headache, unspecified headache type           ED Disposition       ED Disposition   Discharge    Condition   Stable    Date/Time   Thu Jul 25, 2024 6583    Comment   Geneva Knowles discharge to home/self  care.                   Follow-up Information       Follow up With Specialties Details Why Contact Info    Melissa Schreiber MD Internal Medicine Schedule an appointment as soon as possible for a visit   [address] [address] [phone]              Patient's Medications   Discharge Prescriptions    DOXYCYCLINE HYCLATE (VIBRAMYCIN) 100 MG CAPSULE    Take 1 capsule (100 mg total) by mouth 2 (two) times a day for 7 days       Start Date: 7/25/2024 End Date: 8/1/2024       Order Dose: 100 mg       Quantity: 14 capsule    Refills: 0       No discharge procedures on file.    PDMP Review         Value Time User    PDMP Reviewed  Yes 7/25/2022 11:59 AM Erica Viera PA-C            ED Provider  Electronically Signed by             Ximena Ashraf MD  07/25/24 4612

## 2024-07-25 NOTE — ED NOTES
Discharge instructions reviewed with pt. Pt verbalized understanding. And has no further questions at this time. Pt ambulatory off unit with steady gait.      Serenity Chen RN  07/25/24 7914

## 2024-07-29 ENCOUNTER — APPOINTMENT (OUTPATIENT)
Dept: LAB | Facility: HOSPITAL | Age: 79
End: 2024-07-29
Payer: COMMERCIAL

## 2024-07-29 DIAGNOSIS — J01.00 ACUTE MAXILLARY SINUSITIS, RECURRENCE NOT SPECIFIED: ICD-10-CM

## 2024-07-29 DIAGNOSIS — M31.6 TEMPORAL ARTERITIS (HCC): ICD-10-CM

## 2024-07-29 LAB
BASOPHILS # BLD AUTO: 0.05 THOUSANDS/ÂΜL (ref 0–0.1)
BASOPHILS NFR BLD AUTO: 1 % (ref 0–1)
CRP SERPL QL: 1.6 MG/L
EOSINOPHIL # BLD AUTO: 0.22 THOUSAND/ÂΜL (ref 0–0.61)
EOSINOPHIL NFR BLD AUTO: 4 % (ref 0–6)
ERYTHROCYTE [DISTWIDTH] IN BLOOD BY AUTOMATED COUNT: 13.5 % (ref 11.6–15.1)
ERYTHROCYTE [SEDIMENTATION RATE] IN BLOOD: 37 MM/HOUR (ref 0–29)
HCT VFR BLD AUTO: 45.2 % (ref 34.8–46.1)
HGB BLD-MCNC: 14.3 G/DL (ref 11.5–15.4)
IMM GRANULOCYTES # BLD AUTO: 0.01 THOUSAND/UL (ref 0–0.2)
IMM GRANULOCYTES NFR BLD AUTO: 0 % (ref 0–2)
LYMPHOCYTES # BLD AUTO: 2.35 THOUSANDS/ÂΜL (ref 0.6–4.47)
LYMPHOCYTES NFR BLD AUTO: 47 % (ref 14–44)
MCH RBC QN AUTO: 29.8 PG (ref 26.8–34.3)
MCHC RBC AUTO-ENTMCNC: 31.6 G/DL (ref 31.4–37.4)
MCV RBC AUTO: 94 FL (ref 82–98)
MONOCYTES # BLD AUTO: 0.43 THOUSAND/ÂΜL (ref 0.17–1.22)
MONOCYTES NFR BLD AUTO: 9 % (ref 4–12)
NEUTROPHILS # BLD AUTO: 1.96 THOUSANDS/ÂΜL (ref 1.85–7.62)
NEUTS SEG NFR BLD AUTO: 39 % (ref 43–75)
NRBC BLD AUTO-RTO: 0 /100 WBCS
PLATELET # BLD AUTO: 190 THOUSANDS/UL (ref 149–390)
PMV BLD AUTO: 11.1 FL (ref 8.9–12.7)
RBC # BLD AUTO: 4.8 MILLION/UL (ref 3.81–5.12)
WBC # BLD AUTO: 5.02 THOUSAND/UL (ref 4.31–10.16)

## 2024-07-29 PROCEDURE — 86140 C-REACTIVE PROTEIN: CPT

## 2024-07-29 PROCEDURE — 85025 COMPLETE CBC W/AUTO DIFF WBC: CPT

## 2024-07-29 PROCEDURE — 36415 COLL VENOUS BLD VENIPUNCTURE: CPT

## 2024-07-29 PROCEDURE — 85652 RBC SED RATE AUTOMATED: CPT

## 2024-08-07 ENCOUNTER — OFFICE VISIT (OUTPATIENT)
Dept: CARDIAC SURGERY | Facility: CLINIC | Age: 79
End: 2024-08-07
Payer: COMMERCIAL

## 2024-08-07 VITALS
BODY MASS INDEX: 27.38 KG/M2 | RESPIRATION RATE: 14 BRPM | OXYGEN SATURATION: 96 % | DIASTOLIC BLOOD PRESSURE: 77 MMHG | SYSTOLIC BLOOD PRESSURE: 122 MMHG | HEIGHT: 62 IN | HEART RATE: 83 BPM | TEMPERATURE: 98.4 F | WEIGHT: 148.81 LBS

## 2024-08-07 DIAGNOSIS — C34.92 ADENOCARCINOMA OF LEFT LUNG (HCC): Primary | ICD-10-CM

## 2024-08-07 PROCEDURE — 99213 OFFICE O/P EST LOW 20 MIN: CPT | Performed by: THORACIC SURGERY (CARDIOTHORACIC VASCULAR SURGERY)

## 2024-08-07 PROCEDURE — G2211 COMPLEX E/M VISIT ADD ON: HCPCS | Performed by: THORACIC SURGERY (CARDIOTHORACIC VASCULAR SURGERY)

## 2024-08-07 NOTE — PROGRESS NOTES
Ambulatory Visit  Name: Geneva Knowles      : 1945      MRN: [id number]  Encounter Provider: Raulito Bliss MD  Encounter Date: 2024   Encounter department: [facility] [place]    Assessment & Plan   1. Adenocarcinoma of left lung (HCC)  Assessment & Plan:  78-year-old female with history of stage I A2 left lower lobe adenocarcinoma status post 2022 robotic left lower lobe therapeutic wedge resection doing well with no evidence of recurrent disease.    Geneva is doing well 2 years out from robotic lung surgery for her stage I lung cancer.  Her most recent scan shows no evidence of recurrent or metastatic disease.  No new suspicious lesions.  She had a previous 2 mm skin that is resolved.  From my standpoint I would recommend transition to yearly CT imaging with a repeat noncontrast CT in 1 year.  We would order this now and see her back at that time.  She understands she should not get at least a yearly CT imaging until she is 5 years out.  She would be technically cured at that time.  She did discuss some possibility of moving out of state.  We would be happy to forward records should she do so.  She will call us if she needs this.  Otherwise follow-up with repeat CT next year    Raulito Bliss    Orders:  -     CT chest wo contrast; Future; Expected date: 2025      History of Present Illness     Geneva Knowles is a 78 y.o. female who presents back to our office for surveillance imaging.  She notes no major changes.  She denies any major shortness of breath.  No respiratory infections.  Minimal.  No hemoptysis.  No fever chills or unintentional weight loss.    Review of Systems   Constitutional:  Negative for activity change, appetite change, chills, diaphoresis, fatigue, fever and unexpected weight change.   HENT: Negative.     Eyes: Negative.    Respiratory:  Negative for apnea, cough, chest tightness, shortness of breath, wheezing and stridor.   "  Cardiovascular:  Negative for chest pain, palpitations and leg swelling.   Gastrointestinal:  Negative for abdominal distention, abdominal pain, blood in stool, constipation, diarrhea, nausea and vomiting.   Endocrine: Negative.    Genitourinary: Negative.    Musculoskeletal: Negative.    Skin: Negative.    Allergic/Immunologic: Negative.    Neurological: Negative.    Hematological: Negative.    Psychiatric/Behavioral: Negative.         Objective     /77 (BP Location: Right arm, Patient Position: Sitting, Cuff Size: Standard)   Pulse 83   Temp 98.4 °F (36.9 °C) (Temporal)   Resp 14   Ht 5' 1.5\" (1.562 m)   Wt 67.5 kg (148 lb 13 oz)   SpO2 96%   BMI 27.66 kg/m²     Physical Exam  Vitals and nursing note reviewed.   Constitutional:       General: She is not in acute distress.     Appearance: She is well-developed. She is not diaphoretic.   HENT:      Head: Normocephalic and atraumatic.      Mouth/Throat:      Pharynx: No oropharyngeal exudate.   Eyes:      General: No scleral icterus.     Conjunctiva/sclera: Conjunctivae normal.      Pupils: Pupils are equal, round, and reactive to light.   Neck:      Thyroid: No thyromegaly.      Vascular: No JVD.      Trachea: No tracheal deviation.   Cardiovascular:      Rate and Rhythm: Normal rate and regular rhythm.      Heart sounds: Normal heart sounds. No murmur heard.     No friction rub. No gallop.   Pulmonary:      Effort: Pulmonary effort is normal. No respiratory distress.      Breath sounds: Normal breath sounds. No wheezing or rales.      Comments: Normal work of breathing room air.  Chest:      Chest wall: No tenderness.   Abdominal:      General: Bowel sounds are normal. There is no distension.      Palpations: Abdomen is soft. There is no mass.      Tenderness: There is no abdominal tenderness. There is no guarding or rebound.   Musculoskeletal:         General: No tenderness or deformity. Normal range of motion.      Cervical back: Normal range of " motion and neck supple.   Skin:     General: Skin is warm and dry.      Coloration: Skin is not pale.      Findings: No erythema or rash.   Neurological:      Mental Status: She is alert and oriented to person, place, and time.   Psychiatric:         Behavior: Behavior normal.         Thought Content: Thought content normal.         Judgment: Judgment normal.       Administrative Statements       I personally reviewed her CT imaging in PACS.  She has no suspicious mediastinal adenopathy.  No new lung lesions.  Normal postoperative changes after therapeutic wedge resection.

## 2024-08-07 NOTE — ASSESSMENT & PLAN NOTE
78-year-old female with history of stage I A2 left lower lobe adenocarcinoma status post 7/21/2022 robotic left lower lobe therapeutic wedge resection doing well with no evidence of recurrent disease.    Geneva is doing well 2 years out from robotic lung surgery for her stage I lung cancer.  Her most recent scan shows no evidence of recurrent or metastatic disease.  No new suspicious lesions.  She had a previous 2 mm skin that is resolved.  From my standpoint I would recommend transition to yearly CT imaging with a repeat noncontrast CT in 1 year.  We would order this now and see her back at that time.  She understands she should not get at least a yearly CT imaging until she is 5 years out.  She would be technically cured at that time.  She did discuss some possibility of moving out of state.  We would be happy to forward records should she do so.  She will call us if she needs this.  Otherwise follow-up with repeat CT next year    Raulito Bliss

## 2024-08-23 ENCOUNTER — HOSPITAL ENCOUNTER (OUTPATIENT)
Dept: CT IMAGING | Facility: HOSPITAL | Age: 79
End: 2024-08-23
Attending: OTOLARYNGOLOGY
Payer: COMMERCIAL

## 2024-08-23 ENCOUNTER — APPOINTMENT (OUTPATIENT)
Dept: RADIOLOGY | Facility: HOSPITAL | Age: 79
End: 2024-08-23
Payer: COMMERCIAL

## 2024-08-23 DIAGNOSIS — J32.2 CHRONIC ETHMOIDAL SINUSITIS: ICD-10-CM

## 2024-08-23 DIAGNOSIS — J32.3 CHRONIC SPHENOIDAL SINUSITIS: ICD-10-CM

## 2024-08-23 DIAGNOSIS — J32.0 CHRONIC MAXILLARY SINUSITIS: ICD-10-CM

## 2024-08-23 DIAGNOSIS — J32.1 CHRONIC FRONTAL SINUSITIS: ICD-10-CM

## 2024-08-23 PROCEDURE — 70486 CT MAXILLOFACIAL W/O DYE: CPT

## 2024-09-16 ENCOUNTER — APPOINTMENT (OUTPATIENT)
Dept: LAB | Facility: HOSPITAL | Age: 79
End: 2024-09-16
Attending: INTERNAL MEDICINE
Payer: COMMERCIAL

## 2024-09-16 DIAGNOSIS — C34.92 SQUAMOUS CARCINOMA OF LUNG, LEFT (HCC): ICD-10-CM

## 2024-09-16 DIAGNOSIS — E05.00 TOXIC DIFFUSE GOITER WITH PRETIBIAL MYXEDEMA: ICD-10-CM

## 2024-09-16 DIAGNOSIS — E78.49 FAMILIAL COMBINED HYPERLIPIDEMIA: ICD-10-CM

## 2024-09-16 DIAGNOSIS — E03.8 TOXIC DIFFUSE GOITER WITH PRETIBIAL MYXEDEMA: ICD-10-CM

## 2024-09-16 LAB
ABO GROUP BLD: NORMAL
ALBUMIN SERPL BCG-MCNC: 4.1 G/DL (ref 3.5–5)
ALP SERPL-CCNC: 51 U/L (ref 34–104)
ALT SERPL W P-5'-P-CCNC: 15 U/L (ref 7–52)
ANION GAP SERPL CALCULATED.3IONS-SCNC: 5 MMOL/L (ref 4–13)
AST SERPL W P-5'-P-CCNC: 19 U/L (ref 13–39)
BASOPHILS # BLD AUTO: 0.04 THOUSANDS/ΜL (ref 0–0.1)
BASOPHILS NFR BLD AUTO: 1 % (ref 0–1)
BILIRUB SERPL-MCNC: 0.61 MG/DL (ref 0.2–1)
BUN SERPL-MCNC: 15 MG/DL (ref 5–25)
CALCIUM SERPL-MCNC: 9.3 MG/DL (ref 8.4–10.2)
CHLORIDE SERPL-SCNC: 104 MMOL/L (ref 96–108)
CHOLEST SERPL-MCNC: 155 MG/DL
CO2 SERPL-SCNC: 29 MMOL/L (ref 21–32)
CREAT SERPL-MCNC: 0.81 MG/DL (ref 0.6–1.3)
EOSINOPHIL # BLD AUTO: 0.29 THOUSAND/ΜL (ref 0–0.61)
EOSINOPHIL NFR BLD AUTO: 8 % (ref 0–6)
ERYTHROCYTE [DISTWIDTH] IN BLOOD BY AUTOMATED COUNT: 14 % (ref 11.6–15.1)
GFR SERPL CREATININE-BSD FRML MDRD: 70 ML/MIN/1.73SQ M
GLUCOSE P FAST SERPL-MCNC: 100 MG/DL (ref 65–99)
HCT VFR BLD AUTO: 44.8 % (ref 34.8–46.1)
HDLC SERPL-MCNC: 56 MG/DL
HGB BLD-MCNC: 14 G/DL (ref 11.5–15.4)
IMM GRANULOCYTES # BLD AUTO: 0.01 THOUSAND/UL (ref 0–0.2)
IMM GRANULOCYTES NFR BLD AUTO: 0 % (ref 0–2)
LDLC SERPL CALC-MCNC: 85 MG/DL (ref 0–100)
LYMPHOCYTES # BLD AUTO: 2.07 THOUSANDS/ΜL (ref 0.6–4.47)
LYMPHOCYTES NFR BLD AUTO: 55 % (ref 14–44)
MCH RBC QN AUTO: 29.8 PG (ref 26.8–34.3)
MCHC RBC AUTO-ENTMCNC: 31.3 G/DL (ref 31.4–37.4)
MCV RBC AUTO: 95 FL (ref 82–98)
MONOCYTES # BLD AUTO: 0.35 THOUSAND/ΜL (ref 0.17–1.22)
MONOCYTES NFR BLD AUTO: 9 % (ref 4–12)
NEUTROPHILS # BLD AUTO: 1 THOUSANDS/ΜL (ref 1.85–7.62)
NEUTS SEG NFR BLD AUTO: 27 % (ref 43–75)
NONHDLC SERPL-MCNC: 99 MG/DL
NRBC BLD AUTO-RTO: 0 /100 WBCS
PLATELET # BLD AUTO: 166 THOUSANDS/UL (ref 149–390)
PMV BLD AUTO: 10.3 FL (ref 8.9–12.7)
POTASSIUM SERPL-SCNC: 4.4 MMOL/L (ref 3.5–5.3)
PROT SERPL-MCNC: 7.5 G/DL (ref 6.4–8.4)
RBC # BLD AUTO: 4.7 MILLION/UL (ref 3.81–5.12)
RH BLD: POSITIVE
SODIUM SERPL-SCNC: 138 MMOL/L (ref 135–147)
TRIGL SERPL-MCNC: 70 MG/DL
TSH SERPL DL<=0.05 MIU/L-ACNC: 1.55 UIU/ML (ref 0.45–4.5)
WBC # BLD AUTO: 3.76 THOUSAND/UL (ref 4.31–10.16)

## 2024-09-16 PROCEDURE — 85025 COMPLETE CBC W/AUTO DIFF WBC: CPT

## 2024-09-16 PROCEDURE — 36415 COLL VENOUS BLD VENIPUNCTURE: CPT

## 2024-09-16 PROCEDURE — 84443 ASSAY THYROID STIM HORMONE: CPT

## 2024-09-16 PROCEDURE — 80061 LIPID PANEL: CPT

## 2024-09-16 PROCEDURE — 86900 BLOOD TYPING SEROLOGIC ABO: CPT

## 2024-09-16 PROCEDURE — 86901 BLOOD TYPING SEROLOGIC RH(D): CPT

## 2024-09-16 PROCEDURE — 80053 COMPREHEN METABOLIC PANEL: CPT

## 2024-09-19 ENCOUNTER — HOSPITAL ENCOUNTER (OUTPATIENT)
Dept: CT IMAGING | Facility: HOSPITAL | Age: 79
End: 2024-09-19
Attending: INTERNAL MEDICINE
Payer: COMMERCIAL

## 2024-09-19 DIAGNOSIS — M54.81 OCCIPITAL NEURALGIA: ICD-10-CM

## 2024-09-19 PROCEDURE — 70450 CT HEAD/BRAIN W/O DYE: CPT

## 2024-09-30 ENCOUNTER — OFFICE VISIT (OUTPATIENT)
Dept: GASTROENTEROLOGY | Facility: CLINIC | Age: 79
End: 2024-09-30
Payer: COMMERCIAL

## 2024-09-30 VITALS
BODY MASS INDEX: 23.9 KG/M2 | WEIGHT: 140 LBS | HEART RATE: 91 BPM | DIASTOLIC BLOOD PRESSURE: 77 MMHG | SYSTOLIC BLOOD PRESSURE: 125 MMHG | HEIGHT: 64 IN

## 2024-09-30 DIAGNOSIS — R10.12 LEFT UPPER QUADRANT ABDOMINAL PAIN: Primary | ICD-10-CM

## 2024-09-30 DIAGNOSIS — Z12.11 SCREENING FOR COLON CANCER: ICD-10-CM

## 2024-09-30 DIAGNOSIS — K21.9 GASTROESOPHAGEAL REFLUX DISEASE, UNSPECIFIED WHETHER ESOPHAGITIS PRESENT: ICD-10-CM

## 2024-09-30 PROCEDURE — 99214 OFFICE O/P EST MOD 30 MIN: CPT | Performed by: NURSE PRACTITIONER

## 2024-09-30 NOTE — ASSESSMENT & PLAN NOTE
Patient has history of GERD.  Patient reports that she only experiences very rare episodes of GERD for which she takes Tums as needed.   - May take try to figure out why the last lady even came and saw me personally Tums as needed for rare episodes of acid reflux.       Follow up as needed

## 2024-09-30 NOTE — ASSESSMENT & PLAN NOTE
Colon cancer screening today patient had a Cologuard 11/18/2022 which was negative for colon cancer DNA.  Patient denies any lower GI issues.  Patient reports bowel patterns are regular.

## 2024-09-30 NOTE — ASSESSMENT & PLAN NOTE
Patient reports left upper quadrant /left upper lateral chest discomfort which has been ongoing since her left thoracotomy was done on 7/21/2022.  Patient has had EGD, CT scan  and MRI to further evaluate symptoms which was negative for cause of pain.  Left upper quadrant/left chest pain most likely from adhesions secondary to thoracotomy.

## 2024-09-30 NOTE — PROGRESS NOTES
Ambulatory Visit  Name: Geneva Knowles      : 1945      MRN: [id number]  Encounter Provider: SILVER Boswell  Encounter Date: 2024   Encounter department: [facility] GASTROENTEROLOGY [address]    Assessment & Plan  Left upper quadrant abdominal pain  Patient reports left upper quadrant /left upper lateral chest discomfort which has been ongoing since her left thoracotomy was done on 2022.  Patient has had EGD, CT scan  and MRI to further evaluate symptoms which was negative for cause of pain.  Left upper quadrant/left chest pain most likely from adhesions secondary to thoracotomy.       Screening for colon cancer  Colon cancer screening today patient had a Cologuard 2022 which was negative for colon cancer DNA.  Patient denies any lower GI issues.  Patient reports bowel patterns are regular.       Gastroesophageal reflux disease, unspecified whether esophagitis present   Patient has history of GERD.  Patient reports that she only experiences very rare episodes of GERD for which she takes Tums as needed.   - May take try to figure out why the last lady even came and saw me personally Tums as needed for rare episodes of acid reflux.       Follow up as needed    History of Present Illness     Geneva Knowles is a 78 y.o. female who presents to office for follow-up.  Patient reports chronic left upper abdomen/left chest pain since she had her  left thoracotomy done in .  Patient had further evaluation of continued pain with EGD, CT scan, and MRI which were all negative for cause of pain.  Explained to patient that pain most likely from adhesions due to the surgery.  Patient reports rare episodes of acid reflux for which she takes Tums.  Patient denies nausea, vomiting, heartburn, dysphagia, epigastric or lower abdominal pain.  Patient denies blood in stool, blood from rectal area, or black tarry stool. Abdomen exam benign no abdominal tenderness or guarding.Patient  reports bowel patterns are regular.  Patient was previously ordered a colonoscopy in 2023  stated she did not want to have a colonoscopy. Cologuard test in 11/2022  was negative for colon cancer DNA.    EGD done 2/14/2023 showed mild erythema in gastric antrum trauma otherwise normal EGD.  Biopsy showed mild gastritis.  No H. pylori.      Review of Systems   Constitutional:  Negative for chills and fever.   HENT:  Negative for ear pain and sore throat.    Eyes:  Negative for pain and visual disturbance.   Respiratory:  Negative for cough and shortness of breath.    Cardiovascular:  Negative for chest pain and palpitations.   Gastrointestinal:  Positive for abdominal pain. Negative for vomiting.   Genitourinary:  Negative for dysuria and hematuria.   Musculoskeletal:  Negative for arthralgias and back pain.   Skin:  Negative for color change and rash.   Neurological:  Negative for seizures and syncope.   All other systems reviewed and are negative.    Current Outpatient Medications on File Prior to Visit   Medication Sig Dispense Refill    albuterol (PROVENTIL HFA,VENTOLIN HFA) 90 mcg/act inhaler INHALE 2 PUFFS BY MOUTH EVERY 4 HOURS AS DIRECTED AS NEEDED      aspirin 81 mg chewable tablet Chew 81 mg every morning      cholecalciferol (VITAMIN D3) 400 units tablet Take 400 Units by mouth every morning      cyanocobalamin (VITAMIN B-12) 100 mcg tablet Take by mouth daily Pt unsure dose        levothyroxine 100 mcg tablet Take 100 mcg by mouth daily      loratadine (CLARITIN) 10 mg tablet Take 10 mg by mouth as needed for allergies      magnesium gluconate (MAGONATE) 500 MG tablet Take 500 mg by mouth 2 (two) times a day      Omega-3 Fatty Acids (fish oil) 1,000 mg Take 1,000 mg by mouth every morning Pt unsure dose      rosuvastatin (CRESTOR) 5 mg tablet TAKE 1 TABLET(5 MG) BY MOUTH DAILY 90 tablet 3    vitamin E 100 UNIT capsule Take 100 Units by mouth daily Pt unsure        clotrimazole-betamethasone (LOTRISONE)  "1-0.05 % cream Apply topically 3 (three) times a day for 7 days To groin areas 45 g 1    Diclofenac Sodium (VOLTAREN) 1 % Apply 2 g topically 4 (four) times a day (Patient not taking: Reported on 9/30/2024) 100 g 2    fluticasone (FLONASE) 50 mcg/act nasal spray 2 sprays into each nostril daily (Patient not taking: Reported on 9/30/2024) 16 g 5    saccharomyces boulardii (FLORASTOR) 250 mg capsule Take 250 mg by mouth 2 (two) times a day      sodium picosulfate, magnesium oxide, citric acid (Clenpiq) oral solution Take 175 mL (1 bottle) the evening before the colonoscopy, between 5 PM and 9 PM, followed by a second 175 mL bottle 5 hours before the colonoscopy. (Patient not taking: Reported on 11/16/2023) 350 mL 0    triamcinolone (KENALOG) 0.025 % cream APPLY TOPICALLY TO THE AFFECTED AREA TWICE DAILY FOR 1 WEEK       No current facility-administered medications on file prior to visit.      Social History     Tobacco Use    Smoking status: Never    Smokeless tobacco: Never   Vaping Use    Vaping status: Never Used   Substance and Sexual Activity    Alcohol use: Never    Drug use: Never    Sexual activity: Yes     Partners: Male         Objective     /77   Pulse 91   Ht 5' 4\" (1.626 m)   Wt 63.5 kg (140 lb)   BMI 24.03 kg/m²     Physical Exam  Vitals and nursing note reviewed.   Constitutional:       General: She is not in acute distress.     Appearance: She is well-developed.   HENT:      Head: Normocephalic and atraumatic.   Eyes:      Conjunctiva/sclera: Conjunctivae normal.   Cardiovascular:      Rate and Rhythm: Normal rate and regular rhythm.      Pulses: Normal pulses.      Heart sounds: Normal heart sounds. No murmur heard.  Pulmonary:      Effort: Pulmonary effort is normal. No respiratory distress.      Breath sounds: Normal breath sounds. No stridor. No wheezing, rhonchi or rales.   Abdominal:      General: Bowel sounds are normal. There is no distension.      Palpations: Abdomen is soft. There " is no mass.      Tenderness: There is no abdominal tenderness. There is no guarding or rebound.      Hernia: No hernia is present.   Musculoskeletal:         General: No swelling.      Cervical back: Neck supple.      Right lower leg: No edema.      Left lower leg: No edema.   Skin:     General: Skin is warm and dry.      Capillary Refill: Capillary refill takes less than 2 seconds.      Coloration: Skin is not jaundiced or pale.   Neurological:      Mental Status: She is alert and oriented to person, place, and time.   Psychiatric:         Mood and Affect: Mood normal.

## 2024-10-02 ENCOUNTER — NURSE TRIAGE (OUTPATIENT)
Age: 79
End: 2024-10-02

## 2024-10-02 ENCOUNTER — TELEPHONE (OUTPATIENT)
Age: 79
End: 2024-10-02

## 2024-10-02 NOTE — TELEPHONE ENCOUNTER
"Susannan report feeling small bumps in pubic area. She has difficulty describing the location but denies pain or any discharge from bumps. Appointment scheduled for tomorrow.    Reason for Disposition   Patient wants to be seen    Answer Assessment - Initial Assessment Questions  1. APPEARANCE of SWELLING: \"What does it look like?\" (e.g., lymph node, insect bite, mole)      Unsure  2. SIZE: \"How large is the swelling?\" (inches, cm or compare to coins)      Tiny bumps  3. LOCATION: \"Where is the swelling located?\"      pubic  4. ONSET: \"When did the swelling start?\"      Denies  5. PAIN: \"Is it painful?\" If Yes, ask: \"How much?\"      Denies  6. ITCH: \"Does it itch?\" If Yes, ask: \"How much?\"      Denies  7. CAUSE: \"What do you think caused the swelling?\"      Unsure  8. OTHER SYMPTOMS: \"Do you have any other symptoms?\" (e.g., fever)      Denies    Protocols used: Skin Lump or Localized Swelling-ADULT-[place]    "

## 2024-10-02 NOTE — TELEPHONE ENCOUNTER
Patient calling upset about seeing Nery vs a doctor.  Patient stated she would like to know who she's being scheduled with prior to appts vs being seen by any one.

## 2024-10-03 ENCOUNTER — OFFICE VISIT (OUTPATIENT)
Dept: OBGYN CLINIC | Facility: CLINIC | Age: 79
End: 2024-10-03
Payer: COMMERCIAL

## 2024-10-03 VITALS
HEIGHT: 64 IN | BODY MASS INDEX: 25.47 KG/M2 | WEIGHT: 149.2 LBS | SYSTOLIC BLOOD PRESSURE: 128 MMHG | DIASTOLIC BLOOD PRESSURE: 72 MMHG

## 2024-10-03 DIAGNOSIS — R22.9 SKIN LUMPS: Primary | ICD-10-CM

## 2024-10-03 PROCEDURE — 99213 OFFICE O/P EST LOW 20 MIN: CPT | Performed by: PHYSICIAN ASSISTANT

## 2024-10-03 NOTE — PROGRESS NOTES
"Assessment/Plan:      Diagnoses and all orders for this visit:    Skin lumps  -     US abdomen limited; Future        Area of concern feels like adipose tissue/possible lipoma.  Order for U/S entered to confirm.  We will call with results; call if symptoms worsen or change.    Subjective:     Patient ID: Geneva Knowles is a 78 y.o. female.    Patient is here with complaint of lumps under the skin of her lower abdomen/upper pelvis.  She does not know how long they have been there; she just noticed them recently.  They are nontender; no bleeding or drainage.  Denies vaginal discharge, odor, itching, burning, vaginal bleeding, and pelvic pain.        Review of Systems   Genitourinary:  Negative for difficulty urinating, dysuria, frequency, genital sores, hematuria, menstrual problem, pelvic pain, urgency, vaginal bleeding, vaginal discharge and vaginal pain.   Skin:         Lumps of lower abdomen/upper pelvis.         Objective:  Visit Vitals  /72 (BP Location: Left arm, Patient Position: Sitting, Cuff Size: Adult)   Ht 5' 4\" (1.626 m)   Wt 67.7 kg (149 lb 3.2 oz)   BMI 25.61 kg/m²   OB Status Postmenopausal   Smoking Status Never   BSA 1.73 m²         Physical Exam  Vitals reviewed.   Constitutional:       Appearance: Normal appearance. She is well-developed and normal weight.   Abdominal:          Comments: Area of subcutaneous, soft, nontender lumps.   Genitourinary:     General: Normal vulva.      Pubic Area: No rash.       Labia:         Right: No rash, tenderness, lesion or injury.         Left: No rash, tenderness, lesion or injury.    Skin:     General: Skin is warm and dry.   Neurological:      Mental Status: She is alert and oriented to person, place, and time.   Psychiatric:         Mood and Affect: Mood normal.         Behavior: Behavior normal. Behavior is cooperative.         Thought Content: Thought content normal.         Judgment: Judgment normal.           "

## 2024-10-11 ENCOUNTER — HOSPITAL ENCOUNTER (OUTPATIENT)
Dept: ULTRASOUND IMAGING | Facility: HOSPITAL | Age: 79
End: 2024-10-11
Payer: COMMERCIAL

## 2024-10-11 DIAGNOSIS — R22.9 SKIN LUMPS: ICD-10-CM

## 2024-10-11 PROCEDURE — 76705 ECHO EXAM OF ABDOMEN: CPT

## 2024-10-16 ENCOUNTER — TELEPHONE (OUTPATIENT)
Age: 79
End: 2024-10-16

## 2024-10-16 NOTE — TELEPHONE ENCOUNTER
Left pt message with normal results.  Ok per communication consent form. Advised to call back with any questions or concerns.

## 2024-10-30 PROBLEM — Z12.11 SCREENING FOR COLON CANCER: Status: RESOLVED | Noted: 2024-09-30 | Resolved: 2024-10-30

## 2024-11-08 ENCOUNTER — NURSE TRIAGE (OUTPATIENT)
Age: 79
End: 2024-11-08

## 2024-11-08 DIAGNOSIS — R10.10 PAIN OF UPPER ABDOMEN: Primary | ICD-10-CM

## 2024-11-08 NOTE — TELEPHONE ENCOUNTER
I spoke with patient and reviewed recommendation to take an OTC PPI for symptoms. Patient states she does not like to take medications and would prefer not to. She watches her diet, she hydrates but she continues with ongoing symptoms. She questioned dose of OTC PPI's and I reviewed they are 20 mg dose. Patient prefers not to take that amount so I recommended that she could try Pepcid which is available OTC at 10 mg dose and suggested she try it for at least two weeks to see if that helps alleviate symptoms. Patient will consider it.  I also recommended she keep a food diary to see if symptoms increase with certain foods. She is on wait list for earlier appointment with physician.

## 2024-11-08 NOTE — TELEPHONE ENCOUNTER
"LOV 09/30/24, F/U 02/03/25, EGD 02/14/23    Pt transferred to myself by Keshia. OV is scheduled with  02/03/25. Pt does not wish to see an AP.    Pt reports sharp, intermittent, non-radiating mid epigastric pain, 8/10, for the past couple of weeks. The pain occurs randomly on a daily basis. Denies nausea, vomiting, fever, blood mixed with stool. At last OV provider suggested \"left upper quadrant/left chest pain most likely from adhesions secondary to thoracotomy\" however pt disagrees.       Reason for Disposition   Mild abdominal pain    Answer Assessment - Initial Assessment Questions  1. LOCATION: \"Where does it hurt?\"       Epigastric   2. RADIATION: \"Does the pain shoot anywhere else?\" (e.g., chest, back)      denies  3. ONSET: \"When did the pain begin?\" (e.g., minutes, hours or days ago)       Couple of weeks  5. PATTERN \"Does the pain come and go, or is it constant?\"      intermittent  6. SEVERITY: \"How bad is the pain?\"  (e.g., Scale 1-10; mild, moderate, or severe)      8/10  7. RECURRENT SYMPTOM: \"Have you ever had this type of stomach pain before?\" If Yes, ask: \"When was the last time?\" and \"What happened that time?\"       Couple of weeks   8. AGGRAVATING FACTORS: \"Does anything seem to cause this pain?\" (e.g., foods, stress, alcohol)      Denies   10. OTHER SYMPTOMS: \"Do you have any other symptoms?\" (e.g., back pain, diarrhea, fever, urination pain, vomiting)        Denies   11. PREGNANCY: \"Is there any chance you are pregnant?\" \"When was your last menstrual period?\"        N/a    Protocols used: Abdominal Pain - Upper-Adult-[place]    "

## 2024-11-08 NOTE — TELEPHONE ENCOUNTER
Please call patient she  had a EGD done last year which was normal except mild erythema in antrum. Biopsy benign. I will order CT abdomen for further evaluation. Recommend she try acid reduction medication and see if this improves symptoms.

## 2024-11-20 ENCOUNTER — HOSPITAL ENCOUNTER (OUTPATIENT)
Dept: CT IMAGING | Facility: HOSPITAL | Age: 79
Discharge: HOME | End: 2024-11-20
Payer: COMMERCIAL

## 2024-11-20 DIAGNOSIS — R10.10 PAIN OF UPPER ABDOMEN: ICD-10-CM

## 2024-11-20 PROCEDURE — 74150 CT ABDOMEN W/O CONTRAST: CPT

## 2024-11-21 ENCOUNTER — RESULTS FOLLOW-UP (OUTPATIENT)
Dept: GASTROENTEROLOGY | Facility: CLINIC | Age: 79
End: 2024-11-21

## 2024-11-25 ENCOUNTER — HOSPITAL ENCOUNTER (OUTPATIENT)
Dept: RADIOLOGY | Facility: HOSPITAL | Age: 79
Discharge: HOME | End: 2024-11-25
Payer: COMMERCIAL

## 2024-11-25 DIAGNOSIS — S22.32XA CLOSED FRACTURE OF ONE RIB OF LEFT SIDE, INITIAL ENCOUNTER: ICD-10-CM

## 2024-11-25 PROCEDURE — 71100 X-RAY EXAM RIBS UNI 2 VIEWS: CPT

## 2024-12-02 ENCOUNTER — TELEPHONE (OUTPATIENT)
Dept: ADMINISTRATIVE | Facility: HOSPITAL | Age: 79
End: 2024-12-02

## 2024-12-02 NOTE — TELEPHONE ENCOUNTER
Vini Sanabria!     Can you please have someone reach out to pt in regards to diagnostic mammography order pleased in epic. I did scan order into media.     Thank you!

## 2024-12-16 ENCOUNTER — TELEPHONE (OUTPATIENT)
Age: 79
End: 2024-12-16

## 2024-12-16 NOTE — TELEPHONE ENCOUNTER
Pt called stating that she is moving to [place] on 12/28/24 and she would like referrals mailed to her so that she can continue care once she searchers for a new provider.     Any questions please call pt thank you

## 2024-12-16 NOTE — TELEPHONE ENCOUNTER
Caller: Geneva    Doctor: Dr. Elliott     Reason for call: Patient is moving to [place] [id number] and would like to know if her provider could give her a referral to a new cardiologist in that area. Please advise and contact patient.    Call back#: [phone]

## 2024-12-18 NOTE — TELEPHONE ENCOUNTER
Called pt to inform her that Dr. Elliott recommends she establish care with a primary care physician in [place] so they can refer her to a cardiologist in the area. Pt understood. Pt reports she has been calling and trying to set things up. No questions at this time.

## 2024-12-28 DIAGNOSIS — E78.2 MIXED HYPERLIPIDEMIA: ICD-10-CM

## 2024-12-30 RX ORDER — ROSUVASTATIN CALCIUM 5 MG/1
5 TABLET, COATED ORAL DAILY
Qty: 90 TABLET | Refills: 1 | Status: SHIPPED | OUTPATIENT
Start: 2024-12-30

## 2025-01-03 ENCOUNTER — TELEPHONE (OUTPATIENT)
Dept: PULMONOLOGY | Facility: CLINIC | Age: 80
End: 2025-01-03

## 2025-01-03 NOTE — TELEPHONE ENCOUNTER
Pt called in following up on vm she received . [place] out of state . Once she is situated to will get established with a pulmonary doctor  in [place]

## 2025-01-10 DIAGNOSIS — E78.2 MIXED HYPERLIPIDEMIA: ICD-10-CM

## 2025-01-13 RX ORDER — ROSUVASTATIN CALCIUM 5 MG/1
5 TABLET, COATED ORAL DAILY
Qty: 90 TABLET | Refills: 1 | Status: SHIPPED | OUTPATIENT
Start: 2025-01-13

## 2025-01-20 NOTE — PLAN OF CARE
Problem: SAFETY ADULT  Goal: Maintain or return to baseline ADL function  Description: INTERVENTIONS:  -  Assess patient's ability to carry out ADLs; assess patient's baseline for ADL function and identify physical deficits which impact ability to perform ADLs (bathing, care of mouth/teeth, toileting, grooming, dressing, etc )  - Assess/evaluate cause of self-care deficits   - Assess range of motion  - Assess patient's mobility; develop plan if impaired  - Assess patient's need for assistive devices and provide as appropriate  - Encourage maximum independence but intervene and supervise when necessary  - Involve family in performance of ADLs  - Assess for home care needs following discharge   - Consider OT consult to assist with ADL evaluation and planning for discharge  - Provide patient education as appropriate  Outcome: Progressing No

## 2025-02-12 ENCOUNTER — TELEPHONE (OUTPATIENT)
Age: 80
End: 2025-02-12

## 2025-02-19 ENCOUNTER — TELEPHONE (OUTPATIENT)
Age: 80
End: 2025-02-19

## 2025-02-19 NOTE — TELEPHONE ENCOUNTER
Pt called in and would like o speak to  in regards to her lab work she had done in [place] , Patient is trying to get back to [place]. Patient is also concern and would like to know if she should have her CT done sooner than July 2025 . Please advise

## 2025-02-19 NOTE — TELEPHONE ENCOUNTER
Spoke with pt and relayed Dr. Novak's message and she states wants to get it done sooner as she states she is concerned about her health. Asked pt what she was concerned about and she states that her eGFR was abnormal. Informed her that is not something we would look at and that CT of the chest would not be beneficial for concerns related with that. She should follow up with PCP about her concerns.

## (undated) DEVICE — VISUALIZATION SYSTEM: Brand: CLEARIFY

## (undated) DEVICE — NEEDLE SPINAL 20G X 3.5 LF

## (undated) DEVICE — GAUZE SPONGES,16 PLY: Brand: CURITY

## (undated) DEVICE — LUBRICANT INST ELECTROLUBE ANTISTK WO PAD

## (undated) DEVICE — STAPLER SHEATH: Brand: ENDOWRIST

## (undated) DEVICE — Device: Brand: TISSUE RETRIEVAL SYSTEM

## (undated) DEVICE — ADAPTOR TRACH SWIVEL

## (undated) DEVICE — HEAVY DUTY TABLE COVER: Brand: CONVERTORS

## (undated) DEVICE — SUT PROLENE 0 CT-1 30 IN 8424H

## (undated) DEVICE — SURGICEL 4 X 8

## (undated) DEVICE — SEAL

## (undated) DEVICE — SUT VICRYL 3-0 SH 27 IN J416H

## (undated) DEVICE — TROCARS: Brand: KII® OPTICAL ACCESS SYSTEM

## (undated) DEVICE — COLUMN DRAPE

## (undated) DEVICE — SUREFORM 45 RELOAD BLUE: Brand: SUREFORM

## (undated) DEVICE — CANNULA SEAL

## (undated) DEVICE — INTENDED FOR TISSUE SEPARATION, AND OTHER PROCEDURES THAT REQUIRE A SHARP SURGICAL BLADE TO PUNCTURE OR CUT.: Brand: BARD-PARKER SAFETY BLADES SIZE 15, STERILE

## (undated) DEVICE — CANISTER FOR THORACIC SUCTION SYSTEM: Brand: THOPAZ DISPOSABLE CANISTER 0.8L

## (undated) DEVICE — TIP-UP FENESTRATED GRASPER: Brand: ENDOWRIST

## (undated) DEVICE — SUREFORM 45: Brand: SUREFORM

## (undated) DEVICE — CADIERE FORCEPS: Brand: ENDOWRIST

## (undated) DEVICE — SUT MONOCRYL 4-0 PS-2 27 IN Y426H

## (undated) DEVICE — GLOVE INDICATOR PI UNDERGLOVE SZ 8 BLUE

## (undated) DEVICE — SYRINGE 10ML SLIP TIP LF

## (undated) DEVICE — REDUCER: Brand: ENDOWRIST

## (undated) DEVICE — TUBING SUCTION 5MM X 12 FT

## (undated) DEVICE — CHLORAPREP HI-LITE 26ML ORANGE

## (undated) DEVICE — SINGLE USE BIOPSY VALVE MAJ-210: Brand: SINGLE USE BIOPSY VALVE (STERILE)

## (undated) DEVICE — TRAY FOLEY 16FR URIMETER SILICONE SURESTEP

## (undated) DEVICE — MARYLAND BIPOLAR FORCEPS: Brand: ENDOWRIST

## (undated) DEVICE — ADHESIVE SKIN HIGH VISCOSITY EXOFIN 1ML

## (undated) DEVICE — 28 FR STRAIGHT – SOFT PVC CATHETER: Brand: PVC THORACIC CATHETERS

## (undated) DEVICE — SUREFORM 45 RELOAD GREEN: Brand: SUREFORM

## (undated) DEVICE — KIT, BETHLEHEM THORACIC ROBOT: Brand: CARDINAL HEALTH

## (undated) DEVICE — SINGLE TUBING WITH LARGE CONNECTOR FOR THORACIC SUCTION SYSTEM PUMP: Brand: THOPAZ TUBING SINGLE

## (undated) DEVICE — GAUZE ROLL KITTNER

## (undated) DEVICE — SUT VICRYL 0 CT-1 18 IN J740D

## (undated) DEVICE — CLAMP TOWEL TUBING DISPOSABLE

## (undated) DEVICE — VESSEL LOOPS X-RAY DETECTABLE: Brand: DEROYAL

## (undated) DEVICE — SUT VICRYL 2-0 SH 27 IN UNDYED J417H

## (undated) DEVICE — NEEDLE HYPO 22G X 1-1/2 IN

## (undated) DEVICE — ARM DRAPE

## (undated) DEVICE — FIRST STEP BEDSIDE KIT - STAND-UP POUCH, ENDOSCOPIC CLEANING PAD - 1 POUCH: Brand: FIRST STEP BEDSIDE KIT - STAND-UP POUCH, ENDOSCOPIC CLEANING PAD

## (undated) DEVICE — GAUZE SPONGES,USP TYPE VII GAUZE, 12 PLY: Brand: CURITY

## (undated) DEVICE — STERILE EMESIS BASIN                 070: Brand: CARDINAL HEALTH

## (undated) DEVICE — UTILITY MARKER,BLACK WITH LABELS: Brand: DEVON

## (undated) DEVICE — GLOVE SRG BIOGEL ECLIPSE 7.5

## (undated) DEVICE — SINGLE USE SUCTION VALVE MAJ-209: Brand: SINGLE USE SUCTION VALVE (STERILE)